# Patient Record
Sex: MALE | Race: BLACK OR AFRICAN AMERICAN | NOT HISPANIC OR LATINO | Employment: OTHER | URBAN - METROPOLITAN AREA
[De-identification: names, ages, dates, MRNs, and addresses within clinical notes are randomized per-mention and may not be internally consistent; named-entity substitution may affect disease eponyms.]

---

## 2017-11-02 ENCOUNTER — TELEPHONE (OUTPATIENT)
Dept: FAMILY MEDICINE | Facility: CLINIC | Age: 62
End: 2017-11-02

## 2017-11-02 DIAGNOSIS — Z12.11 SCREENING FOR MALIGNANT NEOPLASM OF COLON: Primary | ICD-10-CM

## 2017-11-02 NOTE — TELEPHONE ENCOUNTER
----- Message from Jie Rutledge sent at 11/2/2017 11:03 AM CDT -----  Contact: [phone] Cris   Pt is requesting a order for the colonoscopy Please call pt wife  at your earliest convenience.  Thanks !            Recommendation:      - Discharge patient to home.                       - Resume previous diet today.                       - Await pathology results.                       - Repeat colonoscopy in 2 years for surveillance.

## 2017-11-29 ENCOUNTER — ANESTHESIA EVENT (OUTPATIENT)
Dept: ENDOSCOPY | Facility: HOSPITAL | Age: 62
End: 2017-11-29
Payer: COMMERCIAL

## 2017-11-30 ENCOUNTER — SURGERY (OUTPATIENT)
Age: 62
End: 2017-11-30

## 2017-11-30 ENCOUNTER — HOSPITAL ENCOUNTER (OUTPATIENT)
Facility: HOSPITAL | Age: 62
Discharge: HOME | End: 2017-11-30
Attending: INTERNAL MEDICINE | Admitting: INTERNAL MEDICINE
Payer: COMMERCIAL

## 2017-11-30 ENCOUNTER — ANESTHESIA (OUTPATIENT)
Dept: ENDOSCOPY | Facility: HOSPITAL | Age: 62
End: 2017-11-30
Payer: COMMERCIAL

## 2017-11-30 VITALS
WEIGHT: 180 LBS | OXYGEN SATURATION: 99 % | BODY MASS INDEX: 26.66 KG/M2 | TEMPERATURE: 98 F | RESPIRATION RATE: 18 BRPM | HEIGHT: 69 IN | HEART RATE: 58 BPM | SYSTOLIC BLOOD PRESSURE: 134 MMHG | DIASTOLIC BLOOD PRESSURE: 78 MMHG

## 2017-11-30 DIAGNOSIS — Z12.11 SCREEN FOR COLON CANCER: ICD-10-CM

## 2017-11-30 PROCEDURE — 25000003 PHARM REV CODE 250: Performed by: ANESTHESIOLOGY

## 2017-11-30 PROCEDURE — 63600175 PHARM REV CODE 636 W HCPCS: Performed by: NURSE ANESTHETIST, CERTIFIED REGISTERED

## 2017-11-30 PROCEDURE — 88305 TISSUE EXAM BY PATHOLOGIST: CPT | Performed by: PATHOLOGY

## 2017-11-30 PROCEDURE — D9220A PRA ANESTHESIA: Mod: 33,ANES,, | Performed by: ANESTHESIOLOGY

## 2017-11-30 PROCEDURE — 37000008 HC ANESTHESIA 1ST 15 MINUTES: Performed by: INTERNAL MEDICINE

## 2017-11-30 PROCEDURE — 88305 TISSUE EXAM BY PATHOLOGIST: CPT | Mod: 26,,, | Performed by: PATHOLOGY

## 2017-11-30 PROCEDURE — 45385 COLONOSCOPY W/LESION REMOVAL: CPT | Performed by: INTERNAL MEDICINE

## 2017-11-30 PROCEDURE — 27201089 HC SNARE, DISP (ANY): Performed by: INTERNAL MEDICINE

## 2017-11-30 PROCEDURE — D9220A PRA ANESTHESIA: Mod: 33,CRNA,, | Performed by: NURSE ANESTHETIST, CERTIFIED REGISTERED

## 2017-11-30 PROCEDURE — 37000009 HC ANESTHESIA EA ADD 15 MINS: Performed by: INTERNAL MEDICINE

## 2017-11-30 RX ORDER — PROPOFOL 10 MG/ML
VIAL (ML) INTRAVENOUS
Status: DISCONTINUED | OUTPATIENT
Start: 2017-11-30 | End: 2017-11-30

## 2017-11-30 RX ORDER — PROPOFOL 10 MG/ML
VIAL (ML) INTRAVENOUS
Status: DISCONTINUED
Start: 2017-11-30 | End: 2017-11-30 | Stop reason: HOSPADM

## 2017-11-30 RX ORDER — LIDOCAINE HYDROCHLORIDE 20 MG/ML
INJECTION, SOLUTION EPIDURAL; INFILTRATION; INTRACAUDAL; PERINEURAL
Status: DISCONTINUED
Start: 2017-11-30 | End: 2017-11-30 | Stop reason: HOSPADM

## 2017-11-30 RX ORDER — LIDOCAINE HCL/PF 100 MG/5ML
SYRINGE (ML) INTRAVENOUS
Status: DISCONTINUED | OUTPATIENT
Start: 2017-11-30 | End: 2017-11-30

## 2017-11-30 RX ORDER — SODIUM CHLORIDE 9 MG/ML
INJECTION, SOLUTION INTRAVENOUS CONTINUOUS
Status: DISCONTINUED | OUTPATIENT
Start: 2017-11-30 | End: 2017-11-30 | Stop reason: HOSPADM

## 2017-11-30 RX ORDER — LIDOCAINE HYDROCHLORIDE 10 MG/ML
1 INJECTION, SOLUTION EPIDURAL; INFILTRATION; INTRACAUDAL; PERINEURAL ONCE
Status: DISCONTINUED | OUTPATIENT
Start: 2017-11-30 | End: 2017-11-30 | Stop reason: HOSPADM

## 2017-11-30 RX ADMIN — PROPOFOL 80 MG: 10 INJECTION, EMULSION INTRAVENOUS at 09:11

## 2017-11-30 RX ADMIN — SODIUM CHLORIDE: 0.9 INJECTION, SOLUTION INTRAVENOUS at 08:11

## 2017-11-30 RX ADMIN — LIDOCAINE HYDROCHLORIDE 100 MG: 20 INJECTION, SOLUTION INTRAVENOUS at 09:11

## 2017-11-30 RX ADMIN — PROPOFOL 40 MG: 10 INJECTION, EMULSION INTRAVENOUS at 10:11

## 2017-11-30 NOTE — ANESTHESIA POSTPROCEDURE EVALUATION
"Anesthesia Post Evaluation    Patient: Herbie Naqvi    Procedure(s) Performed: Procedure(s) (LRB):  COLONOSCOPY (N/A)    Final Anesthesia Type: general  Patient location during evaluation: GI PACU  Patient participation: Yes- Able to Participate  Level of consciousness: awake and alert, oriented and awake  Post-procedure vital signs: reviewed and stable  Airway patency: patent  PONV status at discharge: No PONV  Anesthetic complications: no      Cardiovascular status: blood pressure returned to baseline  Respiratory status: unassisted, spontaneous ventilation and room air  Hydration status: euvolemic  Follow-up not needed.        Visit Vitals  /84 (BP Location: Left arm, Patient Position: Lying)   Pulse 73   Temp 36.4 °C (97.5 °F) (Oral)   Resp 18   Ht 5' 9" (1.753 m)   Wt 81.6 kg (180 lb)   SpO2 96%   BMI 26.58 kg/m²       Pain/Maribell Score: Pain Assessment Performed: Yes (11/30/2017 10:17 AM)  Presence of Pain: denies (11/30/2017 10:17 AM)  Maribell Score: 9 (11/30/2017 10:17 AM)      "

## 2017-11-30 NOTE — H&P
"Pre-Procedure H&P:  Reason for Procedure: Colon Ca screening    HPI:  Pt is a 62 y.o. male here for colonoscopy.  Pt. Last had C-scope in 2015, but prep was completely adequate.  No current GI complaints.  No known family Hx of Colon Ca.      History reviewed. No pertinent past medical history.    Past Surgical History:   Procedure Laterality Date    wrist broke       rt side wrist       Family History   Problem Relation Age of Onset    Diabetes Mother     Heart disease Father     Hyperlipidemia Father        Social History     Social History    Marital status:      Spouse name: N/A    Number of children: N/A    Years of education: N/A     Occupational History    Not on file.     Social History Main Topics    Smoking status: Former Smoker     Start date: 3/25/2014    Smokeless tobacco: Never Used    Alcohol use No      Comment: former drinker social stop drinking    Drug use: No    Sexual activity: Not on file     Other Topics Concern    Not on file     Social History Narrative    No narrative on file       Endoscopic History:  C-scope, 2015    Review of patient's allergies indicates:  No Known Allergies    No current facility-administered medications on file prior to encounter.      No current outpatient prescriptions on file prior to encounter.       Current Facility-Administered Medications:     0.9%  NaCl infusion, , Intravenous, Continuous, Andrew Rushing MD, Last Rate: 10 mL/hr at 11/30/17 0848    lidocaine (PF) 10 mg/ml (1%) injection 10 mg, 1 mL, Intradermal, Once, Andrew Rushing MD    ROS: Negative x 10    Patient Vitals for the past 24 hrs:   BP Temp Temp src Pulse Resp SpO2 Height Weight   11/30/17 0837 117/67 98 °F (36.7 °C) Oral (!) 54 18 96 % 5' 9" (1.753 m) 81.6 kg (180 lb)       Gen: Well developed, well nourished, no apparent distress  HEENT: Anicteric, PERRLA  CV: S1, S2, no murmers/rubs, non-displaced PMI  Lungs: CTA-B, normal excursion  Abd: Soft, NT, ND, " normal BS's, no HSM  Ext: No c/c/e, 1+ DP pulses to BLE's  Neuro: CN II-XII grossly intact, no asterixis.  Skin: No rashes/lesions.  Psych: AA&O x 4    Assessment:  Pt. Is a 62 y.o. male with:  1. Colon Ca screening    Recommendations:  1. Colonoscopy  2. F/U with PCP      I would like to take this opportunity to thank you for this consult.  If you have any questions or concerns, please do not hesitate to contact me.

## 2017-11-30 NOTE — TRANSFER OF CARE
"Anesthesia Transfer of Care Note    Patient: Herbie Naqvi    Procedure(s) Performed: Procedure(s) (LRB):  COLONOSCOPY (N/A)    Patient location: GI    Anesthesia Type: general    Transport from OR: Transported from OR on room air with adequate spontaneous ventilation    Post pain: adequate analgesia    Post assessment: no apparent anesthetic complications and tolerated procedure well    Post vital signs: stable    Level of consciousness: awake    Nausea/Vomiting: no nausea/vomiting    Complications: none    Transfer of care protocol was followed      Last vitals:   Visit Vitals  /84 (BP Location: Left arm, Patient Position: Lying)   Pulse 73   Temp 36.4 °C (97.5 °F) (Oral)   Resp 18   Ht 5' 9" (1.753 m)   Wt 81.6 kg (180 lb)   SpO2 96%   BMI 26.58 kg/m²     "

## 2017-11-30 NOTE — DISCHARGE INSTRUCTIONS
Diverticulosis    Diverticulosis means that small pouches have formed in the wall of your large intestine (colon). Most often, this problem causes no symptoms and is common as people age. But the pouches in the colon are at risk of becoming infected. When this happens, the condition is called diverticulitis. Although most people with diverticulosis never develop diverticulitis, it is still not uncommon. Rectal bleeding can also occur and in less common situations, a type of colon inflammation called colitis.  While most people do not have symptoms, some people with diverticulosis may have:  · Abdominal cramps and pain  · Bloating  · Constipation  · Change in bowel habits  Causes  The exact cause of diverticulosis (and diverticulitis) has not been proved, but a few things are associated with the condition:  · Low-fiber diet  · Constipation  · Lack of exercise  Your healthcare provider will talk with you about how to manage your condition. Diet changes may be all that are needed to help control diverticulosis and prevent progression to diverticulitis. If you develop diverticulitis, you will likely need other treatments.  Home care  You may be told to take fiber supplements daily. Fiber adds bulk to the stool so that it passes through the colon more easily. Stool softeners may be recommended. You may also be given medications for pain relief. Be sure to take all medications as directed.  In the past, people were told to avoid corn, nuts, and seeds. This is no longer necessary.  Follow these guidelines when caring for yourself at home:  · Eat unprocessed foods that are high in fiber. Whole grains, fruits, and vegetables are good choices.  · Drink 6 to 8 glasses of water every day unless your healthcare provider has you limit how much fluid you should have.  · Watch for changes in your bowel movements. Tell your provider if you notice any changes.  · Begin an exercise program. Ask your provider how to get started.  Generally, walking is the best.  · Get plenty of rest and sleep.  Follow-up care  Follow up with your healthcare provider, or as advised. Regular visits may be needed to check on your health. Sometimes special procedures such as colonoscopy, are needed after an episode of diverticulitis or blooding. Be sure to keep all your appointments.  If a stool sample was taken, or cultures were done, you should be told if they are positive, or if your treatment needs to be changed. You can call as directed for the results.  If X-rays were done, a radiologist will look at them. You will be told if there is a change in your treatment.  If antibiotics were prescribed, be sure to finish them all.  When to seek medical advice  Call your healthcare provider right away if any of these occur:  · Fever of 100.4°F (38°C) or higher, or as directed by your healthcare provider  · Severe cramps in the lower left side of the abdomen or pain that is getting worse  · Tenderness in the lower left side of the abdomen or worsening pain throughout the abdomen  · Diarrhea or constipation that doesn't get better within 24 hours  · Nausea and vomiting  · Bleeding from the rectum  Call 911  Call emergency services if any of the following occur:  · Trouble breathing  · Confusion  · Very drowsy or trouble awakening  · Fainting or loss of consciousness  · Rapid heart rate  · Chest pain  Date Last Reviewed: 12/30/2015 © 2000-2017 [employer]. [address] [place] [address]. All rights reserved. This information is not intended as a substitute for professional medical care. Always follow your healthcare professional's instructions.        High-Fiber Diet  Fiber is in fruits, vegetables, cereals, and grains. Fiber passes through your body undigested. A high-fiber diet helps food move through your intestinal tract. The added bulk is helpful in preventing constipation. In people with diverticulosis, fiber helps clean out the pouches  along the colon wall. It also prevents new pouches from forming. A high-fiber diet reduces the risk of colon cancer. It also lowers blood cholesterol and prevents high blood sugar in people with diabetes.    The fiber-rich foods listed below should be part of your diet. If you are not used to high-fiber foods, start with 1 or 2 foods from this list. Every 3 to 4 days add a new one to your diet. Do this until you are eating 4 high-fiber foods per day. This should give you 20 to 35 grams of fiber a day. It is also important to drink a lot of water when you are on this diet. You should have 6 to 8 glasses of water a day. Water makes the fiber swell and increases the benefit.  Foods high in dietary fiber  The following foods are high in dietary fiber:  · Breads. Breads made with 100% whole-wheat flour; rachelle, wheat, or rye crackers; whole-grain tortillas, bran muffins.  · Cereals. Whole-grain and bran cereals with bran (shredded wheat, wheat flakes, raisin bran, corn bran); oatmeal, rolled oats, granola, and brown rice.  · Fruits. Fresh fruits and their edible skins (pears, prunes, raisins, berries, apples, and apricots); bananas, citrus fruit, mangoes, pineapple; and prune juice.  · Nuts. Any nuts and seeds.  · Vegetables. Best served raw or lightly cooked. All types, especially: green peas, celery, eggplant, potatoes, spinach, broccoli, [place] sprouts, winter squash, carrots, cauliflower, soybeans, lentils, and fresh and dried beans of all kinds.  · Other. Popcorn, any spices.  Date Last Reviewed: 8/1/2016  © 4849-1554 [employer]. [address] [place] [address], [place] [address]. All rights reserved. This information is not intended as a substitute for professional medical care. Always follow your healthcare professional's instructions.        Understanding Colon and Rectal Polyps    The colon (also called the large intestine) is a muscular tube that forms the last part of the digestive tract. It absorbs  water and stores food waste. The colon is about 4 to 6 feet long. The rectum is the last 6 inches of the colon. The colon and rectum have a smooth lining composed of millions of cells. Changes in these cells can lead to growths in the colon that can become cancerous and should be removed. Multiple tests are available to screen for colon cancer, but the colonoscopy is the most recommended test. During colonoscopy, these polyps can be removed. How often you need this test depends on many things including your condition, your family history, symptoms, and what the findings were at the previous colonoscopy.   When the colon lining changes  Changes that happen in the cells that line the colon or rectum can lead to growths called polyps. Over a period of years, polyps can turn cancerous. Removing polyps early may prevent cancer from ever forming.  Polyps  Polyps are fleshy clumps of tissue that form on the lining of the colon or rectum. Small polyps are usually benign (not cancerous). However, over time, cells in a polyp can change and become cancerous. Certain types of polyps known as adenomatous polyps are premalignant. The risk for invasive cancer increases with the size of the polyp and certain cell and gene features. This means that they can become cancerous if they're not removed. Hyperplastic polyps are benign. They can grow quite large and not turn cancerous.   Cancer  Almost all colorectal cancers start when polyp cells begin growing abnormally. As a cancerous tumor grows, it may involve more and more of the colon or rectum. In time, cancer can also grow beyond the colon or rectum and spread to nearby organs or to glands called lymph nodes. The cells can also travel to other parts of the body. This is known as metastasis. The earlier a cancerous tumor is removed, the better the chance of preventing its spread.    Date Last Reviewed: 8/1/2016  © 8205-4231 [employer]. [address] [place] [address]  [place] [address]. All rights reserved. This information is not intended as a substitute for professional medical care. Always follow your healthcare professional's instructions.

## 2017-11-30 NOTE — ANESTHESIA PREPROCEDURE EVALUATION
11/30/2017  Herbie Naqvi is a 62 y.o., male.    Pre-op Assessment    I have reviewed the Patient Summary Reports.     I have reviewed the Nursing Notes.      Review of Systems  Anesthesia Hx:  No problems with previous Anesthesia    Social:  Non-Smoker, No Alcohol Use Former smoker     Cardiovascular:  Cardiovascular Normal Exercise tolerance: good     Pulmonary:  Pulmonary Normal    Renal/:  Renal/ Normal     Hepatic/GI:  Hepatic/GI Normal        Physical Exam  General:  Well nourished    Airway/Jaw/Neck:  Airway Findings: Mouth Opening: Normal Tongue: Normal  General Airway Assessment: Adult  Mallampati: III  TM Distance: Normal, at least 6 cm  Jaw/Neck Findings:  Neck ROM: Normal ROM      Dental:  Dental Findings: In tact, Upper Dentures, Lower Dentures        Mental Status:  Mental Status Findings:  Cooperative, Alert and Oriented         Anesthesia Plan  Type of Anesthesia, risks & benefits discussed:  Anesthesia Type:  general  Patient's Preference:   Intra-op Monitoring Plan:   Intra-op Monitoring Plan Comments:   Post Op Pain Control Plan:   Post Op Pain Control Plan Comments:   Induction:   IV  Beta Blocker:         Informed Consent: Patient understands risks and agrees with Anesthesia plan.  Questions answered. Anesthesia consent signed with patient.  ASA Score: 2     Day of Surgery Review of History & Physical:    H&P update referred to the provider.         Ready For Surgery From Anesthesia Perspective.

## 2017-12-15 ENCOUNTER — TELEPHONE (OUTPATIENT)
Dept: FAMILY MEDICINE | Facility: CLINIC | Age: 62
End: 2017-12-15

## 2017-12-15 DIAGNOSIS — G89.29 CHRONIC PAIN OF RIGHT KNEE: Primary | ICD-10-CM

## 2017-12-15 DIAGNOSIS — M25.561 CHRONIC PAIN OF RIGHT KNEE: Primary | ICD-10-CM

## 2017-12-15 NOTE — TELEPHONE ENCOUNTER
Pt states was seen before for right knee pain, (Dr Johsn in sport medicine in 2015) would like to get another referral so he can see about getting injection

## 2017-12-15 NOTE — TELEPHONE ENCOUNTER
----- Message from Camila Baca sent at 12/15/2017 10:15 AM CST -----  Contact: Self/[phone]  Patient is requesting a referral. He states that he's experiencing knee pain. Thank you.

## 2017-12-18 NOTE — TELEPHONE ENCOUNTER
Spoke to patient's wife. Informed of referral sent on December 15,2017.. Number given to call to check status and to make appointment. Verbalized understanding.

## 2017-12-18 NOTE — TELEPHONE ENCOUNTER
----- Message from Jud Anderson sent at 12/18/2017  9:08 AM CST -----  Contact: wife  Wife called requesting a new referral to see Dr John Johns/Sports Medicine. Please contact Mrs Naqvi [phone].    Thanks!

## 2017-12-19 ENCOUNTER — TELEPHONE (OUTPATIENT)
Dept: FAMILY MEDICINE | Facility: CLINIC | Age: 62
End: 2017-12-19

## 2017-12-20 NOTE — PROGRESS NOTES
CC: Right knee pain    62 y.o. Male who presents as a new patient to me; saw my colleague John Johns back in 2015.  Given a steroid injection which provided excellent relief.  Pain is now recurrent.  Localizes medially but can be diffuse as well. He works he works in construction.  Exacerbated with going up and down ladders and prolonged periods on feet.  Also reports stiffness and generalized discomfort when getting up in the morning.  Describes secondary activity limitation.  Sometimes nighttime sleep disruption.  Prior treatment is also included therapy and oral medication.  Is drinker's previous discrete knee injury while playing football in high school.  Denies any left-sided complaints.    Occasional for smoking.   Negative for diabetes.    This patient is seen in consultation requested by Adilia Menjivar PA-C. We will communicate findings back to the requesting provider via electronic letter.     REVIEW OF SYSTEMS:   Constitution: Negative. Negative for chills, fever and night sweats.    Hematologic/Lymphatic: Negative for bleeding problem. Does not bruise/bleed easily.   Skin: Negative for dry skin, itching and rash.   Musculoskeletal: Negative for falls. Positive for right knee pain and  muscle weakness.     PAST MEDICAL HISTORY:   History reviewed. No pertinent past medical history.    PAST SURGICAL HISTORY:   Past Surgical History:   Procedure Laterality Date    COLONOSCOPY N/A 11/30/2017    Procedure: COLONOSCOPY;  Surgeon: John Roman MD;  Location: [facility];  Service: Endoscopy;  Laterality: N/A;  colonoscopy confirmed    wrist broke       rt side wrist       FAMILY HISTORY:   Family History   Problem Relation Age of Onset    Diabetes Mother     Heart disease Father     Hyperlipidemia Father        SOCIAL HISTORY:   Social History     Social History    Marital status:      Spouse name: N/A    Number of children: N/A    Years of education: N/A     Occupational History    Not  "on file.     Social History Main Topics    Smoking status: Former Smoker     Start date: 3/25/2014    Smokeless tobacco: Never Used    Alcohol use No      Comment: former drinker social stop drinking    Drug use: No    Sexual activity: Not on file     Other Topics Concern    Not on file     Social History Narrative    No narrative on file       MEDICATIONS:   No current outpatient prescriptions on file.    ALLERGIES:   Review of patient's allergies indicates:  No Known Allergies     PHYSICAL EXAMINATION:  /84   Pulse 77   Ht 5' 9" (1.753 m)   Wt 81.6 kg (180 lb)   BMI 26.58 kg/m²   General: Well-developed well-nourished 62 y.o. malein no acute distress   Cardiovascular: Regular rhythm by palpation of distal pulse, normal color and temperature, no concerning varicosities on symptomatic side   Lungs: No labored breathing or wheezing appreciated   Neuro: Alert and oriented ×3   Psychiatric: well oriented to person, place and time, demonstrates normal mood and affect   Skin: No rashes, lesions or ulcers, normal temperature, turgor, and texture on involved extremity    Ortho/SPM Exam   Examination of the right knee demonstrates bony hypertrophy of osteoarthritis with associated swelling.  No effusion.  Good quadriceps bulk and tone.  Intact extensor mechanism.  Passive range of motion is 5° short of full extension to 120° of flexion.  Ligamentously stable.  Pain and crepitus on range of motion.  Positive patellar grind test.  Medial joint line tenderness.  Standing varus alignment.    IMAGING:    X-rays including standing, weight bearing AP and flexion bilateral knees, RIGHT knee lateral and sunrise views ordered and images reviewed by me show:    Right compartment degenerative arthritis which is advanced, bone-on-bone changes.    ASSESSMENT:      ICD-10-CM ICD-9-CM   1. Primary osteoarthritis of right knee M17.11 715.16   2. Chronic pain of right knee M25.561 719.46    G89.29 338.29     PLAN:   "     Treatment options for advanced knee arthritis were discussed.  Both operative and nonoperative.  The patient would like to hold off on knee replacement at this time.  Repeat steroid injection was provided.  We also discussed low-impact cardio exercise in smoking cessation.  Possible knee replacement next summer.  I'm happy to see him back as needed.    Large Joint Aspiration/Injection  Date/Time: 12/21/2017 4:32 PM  Performed by: CAROL WATSON  Authorized by: CAROL WATSON     Consent Done?:  Yes (Verbal)  Indications:  Pain  Procedure site marked: Yes    Timeout: Prior to procedure the correct patient, procedure, and site was verified      Location:  Knee  Site:  L knee  Prep: Patient was prepped and draped in usual sterile fashion    Ultrasonic Guidance for needle placement: No  Needle size:  22 G  Approach:  Lateral  Medications:  40 mg triamcinolone acetonide 40 mg/mL  Patient tolerance:  Patient tolerated the procedure well with no immediate complications

## 2017-12-21 ENCOUNTER — OFFICE VISIT (OUTPATIENT)
Dept: SPORTS MEDICINE | Facility: CLINIC | Age: 62
End: 2017-12-21
Payer: COMMERCIAL

## 2017-12-21 ENCOUNTER — HOSPITAL ENCOUNTER (OUTPATIENT)
Dept: RADIOLOGY | Facility: HOSPITAL | Age: 62
Discharge: HOME | End: 2017-12-21
Attending: ORTHOPAEDIC SURGERY
Payer: COMMERCIAL

## 2017-12-21 VITALS
DIASTOLIC BLOOD PRESSURE: 84 MMHG | HEIGHT: 69 IN | HEART RATE: 77 BPM | BODY MASS INDEX: 26.66 KG/M2 | SYSTOLIC BLOOD PRESSURE: 127 MMHG | WEIGHT: 180 LBS

## 2017-12-21 DIAGNOSIS — M25.561 CHRONIC PAIN OF RIGHT KNEE: ICD-10-CM

## 2017-12-21 DIAGNOSIS — G89.29 CHRONIC PAIN OF RIGHT KNEE: ICD-10-CM

## 2017-12-21 DIAGNOSIS — M17.11 PRIMARY OSTEOARTHRITIS OF RIGHT KNEE: Primary | ICD-10-CM

## 2017-12-21 DIAGNOSIS — M25.561 RIGHT KNEE PAIN, UNSPECIFIED CHRONICITY: ICD-10-CM

## 2017-12-21 PROCEDURE — 73562 X-RAY EXAM OF KNEE 3: CPT | Mod: 26,59,LT, | Performed by: RADIOLOGY

## 2017-12-21 PROCEDURE — 99213 OFFICE O/P EST LOW 20 MIN: CPT | Mod: 25,S$GLB,, | Performed by: ORTHOPAEDIC SURGERY

## 2017-12-21 PROCEDURE — 99999 PR PBB SHADOW E&M-EST. PATIENT-LVL III: CPT | Mod: PBBFAC,,, | Performed by: ORTHOPAEDIC SURGERY

## 2017-12-21 PROCEDURE — 20610 DRAIN/INJ JOINT/BURSA W/O US: CPT | Mod: LT,S$GLB,, | Performed by: ORTHOPAEDIC SURGERY

## 2017-12-21 PROCEDURE — 73564 X-RAY EXAM KNEE 4 OR MORE: CPT | Mod: 26,RT,, | Performed by: RADIOLOGY

## 2017-12-21 PROCEDURE — 73564 X-RAY EXAM KNEE 4 OR MORE: CPT | Mod: TC,PO,RT

## 2017-12-21 RX ORDER — MELOXICAM 15 MG/1
15 TABLET ORAL DAILY
Qty: 60 TABLET | Refills: 2 | Status: SHIPPED | OUTPATIENT
Start: 2017-12-21 | End: 2019-03-19 | Stop reason: SDUPTHER

## 2017-12-21 RX ORDER — TRIAMCINOLONE ACETONIDE 40 MG/ML
40 INJECTION, SUSPENSION INTRA-ARTICULAR; INTRAMUSCULAR
Status: DISCONTINUED | OUTPATIENT
Start: 2017-12-21 | End: 2017-12-21 | Stop reason: HOSPADM

## 2017-12-21 RX ADMIN — TRIAMCINOLONE ACETONIDE 40 MG: 40 INJECTION, SUSPENSION INTRA-ARTICULAR; INTRAMUSCULAR at 04:12

## 2017-12-21 NOTE — LETTER
December 21, 2017      Adilia Menjivar PA-C  [address]  [place] [address]           [facility]  [address]  [facility] [address]  Phone: [phone]          Patient: Herbie Naqvi   MR Number: [id number]   YOB: 1955   Date of Visit: 12/21/2017       Dear Adilia Menjivar:    Thank you for referring Herbie Naqvi to me for evaluation. Attached you will find relevant portions of my assessment and plan of care.    If you have questions, please do not hesitate to call me. I look forward to following Herbie Naqvi along with you.    Sincerely,    W Mitchel Rodríguez MD    Enclosure  CC:  No Recipients    If you would like to receive this communication electronically, please contact [email or web address] or [phone] to request more information on [employer] Link access.    For providers and/or their staff who would like to refer a patient to [facility], please contact us through our one-stop-shop provider referral line, Joni Hutton, at [phone].    If you feel you have received this communication in error or would no longer like to receive these types of communications, please e-mail [email or web address]

## 2018-03-22 ENCOUNTER — LAB VISIT (OUTPATIENT)
Dept: LAB | Facility: HOSPITAL | Age: 63
End: 2018-03-22
Attending: FAMILY MEDICINE
Payer: COMMERCIAL

## 2018-03-22 ENCOUNTER — OFFICE VISIT (OUTPATIENT)
Dept: FAMILY MEDICINE | Facility: CLINIC | Age: 63
End: 2018-03-22
Payer: COMMERCIAL

## 2018-03-22 VITALS
RESPIRATION RATE: 18 BRPM | BODY MASS INDEX: 28.05 KG/M2 | OXYGEN SATURATION: 97 % | HEIGHT: 69 IN | TEMPERATURE: 98 F | DIASTOLIC BLOOD PRESSURE: 88 MMHG | WEIGHT: 189.38 LBS | SYSTOLIC BLOOD PRESSURE: 136 MMHG | HEART RATE: 77 BPM

## 2018-03-22 DIAGNOSIS — S83.231D COMPLEX TEAR OF MEDIAL MENISCUS OF RIGHT KNEE AS CURRENT INJURY, SUBSEQUENT ENCOUNTER: ICD-10-CM

## 2018-03-22 DIAGNOSIS — Z00.00 WELL ADULT EXAM: Primary | ICD-10-CM

## 2018-03-22 DIAGNOSIS — Z00.00 WELL ADULT EXAM: ICD-10-CM

## 2018-03-22 PROBLEM — S83.231A COMPLEX TEAR OF MEDIAL MENISCUS OF RIGHT KNEE AS CURRENT INJURY: Status: ACTIVE | Noted: 2018-03-22

## 2018-03-22 LAB
BASOPHILS # BLD AUTO: 0.06 K/UL
BASOPHILS NFR BLD: 1.1 %
DIFFERENTIAL METHOD: ABNORMAL
EOSINOPHIL # BLD AUTO: 0.1 K/UL
EOSINOPHIL NFR BLD: 1.1 %
ERYTHROCYTE [DISTWIDTH] IN BLOOD BY AUTOMATED COUNT: 14.4 %
HCT VFR BLD AUTO: 41.8 %
HGB BLD-MCNC: 13.7 G/DL
IMM GRANULOCYTES # BLD AUTO: 0.01 K/UL
IMM GRANULOCYTES NFR BLD AUTO: 0.2 %
LYMPHOCYTES # BLD AUTO: 2.7 K/UL
LYMPHOCYTES NFR BLD: 48.5 %
MCH RBC QN AUTO: 29.8 PG
MCHC RBC AUTO-ENTMCNC: 32.8 G/DL
MCV RBC AUTO: 91 FL
MONOCYTES # BLD AUTO: 0.4 K/UL
MONOCYTES NFR BLD: 7.6 %
NEUTROPHILS # BLD AUTO: 2.3 K/UL
NEUTROPHILS NFR BLD: 41.5 %
NRBC BLD-RTO: 0 /100 WBC
PLATELET # BLD AUTO: 259 K/UL
PMV BLD AUTO: 9.6 FL
RBC # BLD AUTO: 4.6 M/UL
WBC # BLD AUTO: 5.5 K/UL

## 2018-03-22 PROCEDURE — 80061 LIPID PANEL: CPT

## 2018-03-22 PROCEDURE — 99396 PREV VISIT EST AGE 40-64: CPT | Mod: S$GLB,,, | Performed by: FAMILY MEDICINE

## 2018-03-22 PROCEDURE — 80053 COMPREHEN METABOLIC PANEL: CPT

## 2018-03-22 PROCEDURE — 84153 ASSAY OF PSA TOTAL: CPT

## 2018-03-22 PROCEDURE — 85025 COMPLETE CBC W/AUTO DIFF WBC: CPT

## 2018-03-22 PROCEDURE — 99999 PR PBB SHADOW E&M-EST. PATIENT-LVL III: CPT | Mod: PBBFAC,,, | Performed by: FAMILY MEDICINE

## 2018-03-22 PROCEDURE — 36415 COLL VENOUS BLD VENIPUNCTURE: CPT | Mod: PO

## 2018-03-22 NOTE — PROGRESS NOTES
Chief Complaint   Patient presents with    Annual Exam       Herbie Naqvi is a 62 y.o. male who presents per the Chief Complaint.  Pt is known to me and was last seen by me on 6/25/2015.  All known chronic medical issues have been documented.       Knee Pain    The incident occurred more than 1 week ago. There was no injury mechanism. The pain is present in the right knee. The quality of the pain is described as aching and stabbing. The pain is at a severity of 5/10. The pain is moderate. The pain has been intermittent since onset. Pertinent negatives include no inability to bear weight, loss of motion, loss of sensation, muscle weakness, numbness or tingling. He reports no foreign bodies present. The symptoms are aggravated by movement and weight bearing. He has tried nothing for the symptoms.        ROS  Review of Systems   Constitutional: Negative for activity change, appetite change, chills, fatigue and fever.   HENT: Negative for congestion, ear pain, postnasal drip, rhinorrhea, sinus pressure, sore throat and trouble swallowing.    Eyes: Negative for pain and visual disturbance.   Respiratory: Negative for cough, chest tightness and shortness of breath.    Cardiovascular: Negative for chest pain.   Gastrointestinal: Negative for abdominal pain, constipation, diarrhea, nausea and vomiting.   Genitourinary: Negative for difficulty urinating, dysuria, flank pain, frequency, penile pain, penile swelling, scrotal swelling, testicular pain and urgency.   Musculoskeletal: Positive for arthralgias (right knee). Negative for back pain, joint swelling, myalgias, neck pain and neck stiffness.   Skin: Negative.    Allergic/Immunologic: Negative for environmental allergies, food allergies and immunocompromised state.   Neurological: Negative for dizziness, tingling, weakness, light-headedness, numbness and headaches.   Psychiatric/Behavioral: Negative.        Physical Exam  Vitals:    03/22/18 1321   BP: 136/88   Pulse:  "77   Resp: 18   Temp: 98.2 °F (36.8 °C)    Body mass index is 27.97 kg/m².  Weight: 85.9 kg (189 lb 6 oz)   Height: 5' 9" (175.3 cm)     Physical Exam   Constitutional: He is oriented to person, place, and time. He appears well-developed and well-nourished. He is active and cooperative.  Non-toxic appearance. He does not have a sickly appearance. He does not appear ill. No distress.   HENT:   Head: Normocephalic and atraumatic.   Right Ear: Hearing, tympanic membrane, external ear and ear canal normal. No tenderness. No foreign bodies. No mastoid tenderness. Tympanic membrane is not injected, not scarred, not perforated, not erythematous, not retracted and not bulging. No hemotympanum. No decreased hearing is noted.   Left Ear: Hearing, tympanic membrane, external ear and ear canal normal. No tenderness. No foreign bodies. No mastoid tenderness. Tympanic membrane is not injected, not scarred, not perforated, not erythematous, not retracted and not bulging. No hemotympanum. No decreased hearing is noted.   Nose: Nose normal. No sinus tenderness, nasal deformity or septal deviation. No epistaxis.  No foreign bodies.   Mouth/Throat: Uvula is midline, oropharynx is clear and moist and mucous membranes are normal. He does not have dentures. Normal dentition. No uvula swelling or dental caries. No oropharyngeal exudate, posterior oropharyngeal edema, posterior oropharyngeal erythema or tonsillar abscesses.   Eyes: Conjunctivae, EOM and lids are normal. Pupils are equal, round, and reactive to light. Right eye exhibits no chemosis, no discharge, no exudate and no hordeolum. No foreign body present in the right eye. Left eye exhibits no chemosis, no discharge, no exudate and no hordeolum. No foreign body present in the left eye. No scleral icterus.   Neck: Normal range of motion and full passive range of motion without pain. Neck supple. No thyroid mass and no thyromegaly present.   Cardiovascular: Normal rate, regular " rhythm, S1 normal, S2 normal and normal heart sounds.  Exam reveals no gallop and no friction rub.    No murmur heard.  Pulmonary/Chest: Effort normal and breath sounds normal. He has no decreased breath sounds. He has no wheezes. He has no rhonchi. He has no rales.   Abdominal: Soft. Normal appearance and bowel sounds are normal. He exhibits no distension, no ascites and no mass. There is no hepatosplenomegaly. There is no tenderness. There is no rigidity, no rebound and no guarding. No hernia.   Musculoskeletal: Normal range of motion.   Lymphadenopathy:        Head (right side): No submental, no submandibular, no preauricular and no posterior auricular adenopathy present.        Head (left side): No submental, no submandibular, no preauricular and no posterior auricular adenopathy present.     He has no cervical adenopathy.   Neurological: He is alert and oriented to person, place, and time. He has normal strength. He displays no atrophy and no tremor. No cranial nerve deficit or sensory deficit. He exhibits normal muscle tone. He displays no seizure activity.   Skin: Skin is warm, dry and intact. No rash noted. He is not diaphoretic. No pallor.   Psychiatric: He has a normal mood and affect. His speech is normal and behavior is normal. Judgment and thought content normal. Cognition and memory are normal. He is attentive.   Vitals reviewed.      Assessment & Plan    1. Well adult exam  Discussed age and gender appropriate screenings at this visit and encouraged a healthy diet with low saturated fats, and increased physical activity.  Counseled on medically appropriate vaccines based on age and current health condition.  Screening test will be ordered and once completed, patient will be notified of results when available.  If necessary, will follow up to discuss and manage further.   - CBC auto differential; Future  - Comprehensive metabolic panel; Future  - Lipid panel; Future  - PSA, Screening; Future    2.  Complex tear of medial meniscus of right knee as current injury, subsequent encounter  Managed by Orthopedics; recent steroid injection in December.  Patient is planning on surgical repair later this year based on recommendation by surgeon.      Follow up documented    ACTIVE MEDICAL ISSUES:  Documented in Problem List    PAST MEDICAL HISTORY  Documented    PAST SURGICAL HISTORY:  Documented    SOCIAL HISTORY:  Documented    FAMILY HISTORY:  Documented    ALLERGIES AND MEDICATIONS: updated and reviewed.  Documented    Health Maintenance       Date Due Completion Date    TETANUS VACCINE 07/14/1974 ---    Zoster Vaccine 07/14/2016 ---    Influenza Vaccine 08/01/2017 ---    Lipid Panel 03/26/2020 3/26/2015    Colonoscopy 11/30/2022 11/30/2017

## 2018-03-23 LAB
ALBUMIN SERPL BCP-MCNC: 4.3 G/DL
ALP SERPL-CCNC: 63 U/L
ALT SERPL W/O P-5'-P-CCNC: 19 U/L
ANION GAP SERPL CALC-SCNC: 6 MMOL/L
AST SERPL-CCNC: 22 U/L
BILIRUB SERPL-MCNC: 0.5 MG/DL
BUN SERPL-MCNC: 11 MG/DL
CALCIUM SERPL-MCNC: 9.9 MG/DL
CHLORIDE SERPL-SCNC: 105 MMOL/L
CHOLEST SERPL-MCNC: 164 MG/DL
CHOLEST/HDLC SERPL: 3.2 {RATIO}
CO2 SERPL-SCNC: 30 MMOL/L
COMPLEXED PSA SERPL-MCNC: 1.4 NG/ML
CREAT SERPL-MCNC: 0.9 MG/DL
EST. GFR  (AFRICAN AMERICAN): >60 ML/MIN/1.73 M^2
EST. GFR  (NON AFRICAN AMERICAN): >60 ML/MIN/1.73 M^2
GLUCOSE SERPL-MCNC: 83 MG/DL
HDLC SERPL-MCNC: 51 MG/DL
HDLC SERPL: 31.1 %
LDLC SERPL CALC-MCNC: 95 MG/DL
NONHDLC SERPL-MCNC: 113 MG/DL
POTASSIUM SERPL-SCNC: 4.1 MMOL/L
PROT SERPL-MCNC: 7.2 G/DL
SODIUM SERPL-SCNC: 141 MMOL/L
TRIGL SERPL-MCNC: 90 MG/DL

## 2019-03-19 ENCOUNTER — OFFICE VISIT (OUTPATIENT)
Dept: FAMILY MEDICINE | Facility: CLINIC | Age: 64
End: 2019-03-19
Payer: COMMERCIAL

## 2019-03-19 ENCOUNTER — LAB VISIT (OUTPATIENT)
Dept: LAB | Facility: HOSPITAL | Age: 64
End: 2019-03-19
Attending: FAMILY MEDICINE
Payer: COMMERCIAL

## 2019-03-19 VITALS
HEIGHT: 69 IN | RESPIRATION RATE: 20 BRPM | OXYGEN SATURATION: 96 % | BODY MASS INDEX: 28.71 KG/M2 | DIASTOLIC BLOOD PRESSURE: 68 MMHG | WEIGHT: 193.81 LBS | SYSTOLIC BLOOD PRESSURE: 120 MMHG | HEART RATE: 61 BPM | TEMPERATURE: 98 F

## 2019-03-19 DIAGNOSIS — Z00.00 WELL ADULT EXAM: ICD-10-CM

## 2019-03-19 DIAGNOSIS — Z23 NEED FOR TETANUS BOOSTER: ICD-10-CM

## 2019-03-19 DIAGNOSIS — Z00.00 WELL ADULT EXAM: Primary | ICD-10-CM

## 2019-03-19 DIAGNOSIS — S83.231S COMPLEX TEAR OF MEDIAL MENISCUS OF RIGHT KNEE AS CURRENT INJURY, SEQUELA: ICD-10-CM

## 2019-03-19 DIAGNOSIS — N52.9 ERECTILE DYSFUNCTION, UNSPECIFIED ERECTILE DYSFUNCTION TYPE: ICD-10-CM

## 2019-03-19 LAB
ALBUMIN SERPL BCP-MCNC: 4.1 G/DL
ALP SERPL-CCNC: 61 U/L
ALT SERPL W/O P-5'-P-CCNC: 25 U/L
ANION GAP SERPL CALC-SCNC: 7 MMOL/L
AST SERPL-CCNC: 26 U/L
BASOPHILS # BLD AUTO: 0.05 K/UL
BASOPHILS NFR BLD: 1.1 %
BILIRUB SERPL-MCNC: 0.4 MG/DL
BUN SERPL-MCNC: 9 MG/DL
CALCIUM SERPL-MCNC: 9.7 MG/DL
CHLORIDE SERPL-SCNC: 105 MMOL/L
CHOLEST SERPL-MCNC: 162 MG/DL
CHOLEST/HDLC SERPL: 3.4 {RATIO}
CO2 SERPL-SCNC: 29 MMOL/L
COMPLEXED PSA SERPL-MCNC: 1.7 NG/ML
CREAT SERPL-MCNC: 0.8 MG/DL
DIFFERENTIAL METHOD: NORMAL
EOSINOPHIL # BLD AUTO: 0.2 K/UL
EOSINOPHIL NFR BLD: 3.2 %
ERYTHROCYTE [DISTWIDTH] IN BLOOD BY AUTOMATED COUNT: 14.4 %
EST. GFR  (AFRICAN AMERICAN): >60 ML/MIN/1.73 M^2
EST. GFR  (NON AFRICAN AMERICAN): >60 ML/MIN/1.73 M^2
ESTIMATED AVG GLUCOSE: 123 MG/DL
GLUCOSE SERPL-MCNC: 101 MG/DL
HBA1C MFR BLD HPLC: 5.9 %
HCT VFR BLD AUTO: 41.9 %
HDLC SERPL-MCNC: 48 MG/DL
HDLC SERPL: 29.6 %
HGB BLD-MCNC: 14.1 G/DL
IMM GRANULOCYTES # BLD AUTO: 0.02 K/UL
IMM GRANULOCYTES NFR BLD AUTO: 0.4 %
LDLC SERPL CALC-MCNC: 95.6 MG/DL
LYMPHOCYTES # BLD AUTO: 2 K/UL
LYMPHOCYTES NFR BLD: 42.4 %
MCH RBC QN AUTO: 30.1 PG
MCHC RBC AUTO-ENTMCNC: 33.7 G/DL
MCV RBC AUTO: 90 FL
MONOCYTES # BLD AUTO: 0.4 K/UL
MONOCYTES NFR BLD: 9.3 %
NEUTROPHILS # BLD AUTO: 2.1 K/UL
NEUTROPHILS NFR BLD: 43.6 %
NONHDLC SERPL-MCNC: 114 MG/DL
NRBC BLD-RTO: 0 /100 WBC
PLATELET # BLD AUTO: 242 K/UL
PMV BLD AUTO: 9.6 FL
POTASSIUM SERPL-SCNC: 4.2 MMOL/L
PROT SERPL-MCNC: 6.9 G/DL
RBC # BLD AUTO: 4.68 M/UL
SODIUM SERPL-SCNC: 141 MMOL/L
T4 FREE SERPL-MCNC: 1.02 NG/DL
TRIGL SERPL-MCNC: 92 MG/DL
TSH SERPL DL<=0.005 MIU/L-ACNC: 0.37 UIU/ML
WBC # BLD AUTO: 4.72 K/UL

## 2019-03-19 PROCEDURE — 90715 TDAP VACCINE 7 YRS/> IM: CPT | Mod: S$GLB,,, | Performed by: FAMILY MEDICINE

## 2019-03-19 PROCEDURE — 90471 TDAP VACCINE GREATER THAN OR EQUAL TO 7YO IM: ICD-10-PCS | Mod: S$GLB,,, | Performed by: FAMILY MEDICINE

## 2019-03-19 PROCEDURE — 84439 ASSAY OF FREE THYROXINE: CPT

## 2019-03-19 PROCEDURE — 99396 PR PREVENTIVE VISIT,EST,40-64: ICD-10-PCS | Mod: 25,S$GLB,, | Performed by: FAMILY MEDICINE

## 2019-03-19 PROCEDURE — 80061 LIPID PANEL: CPT

## 2019-03-19 PROCEDURE — 80053 COMPREHEN METABOLIC PANEL: CPT

## 2019-03-19 PROCEDURE — 36415 COLL VENOUS BLD VENIPUNCTURE: CPT | Mod: PO

## 2019-03-19 PROCEDURE — 83036 HEMOGLOBIN GLYCOSYLATED A1C: CPT

## 2019-03-19 PROCEDURE — 99999 PR PBB SHADOW E&M-EST. PATIENT-LVL III: CPT | Mod: PBBFAC,,, | Performed by: FAMILY MEDICINE

## 2019-03-19 PROCEDURE — 90715 TDAP VACCINE GREATER THAN OR EQUAL TO 7YO IM: ICD-10-PCS | Mod: S$GLB,,, | Performed by: FAMILY MEDICINE

## 2019-03-19 PROCEDURE — 99396 PREV VISIT EST AGE 40-64: CPT | Mod: 25,S$GLB,, | Performed by: FAMILY MEDICINE

## 2019-03-19 PROCEDURE — 84153 ASSAY OF PSA TOTAL: CPT

## 2019-03-19 PROCEDURE — 90471 IMMUNIZATION ADMIN: CPT | Mod: S$GLB,,, | Performed by: FAMILY MEDICINE

## 2019-03-19 PROCEDURE — 84443 ASSAY THYROID STIM HORMONE: CPT

## 2019-03-19 PROCEDURE — 99999 PR PBB SHADOW E&M-EST. PATIENT-LVL III: ICD-10-PCS | Mod: PBBFAC,,, | Performed by: FAMILY MEDICINE

## 2019-03-19 PROCEDURE — 85025 COMPLETE CBC W/AUTO DIFF WBC: CPT

## 2019-03-19 RX ORDER — SILDENAFIL 100 MG/1
100 TABLET, FILM COATED ORAL DAILY PRN
Qty: 15 TABLET | Refills: 5 | Status: SHIPPED | OUTPATIENT
Start: 2019-03-19 | End: 2021-12-12

## 2019-03-19 RX ORDER — MELOXICAM 15 MG/1
15 TABLET ORAL DAILY
Qty: 60 TABLET | Refills: 5 | Status: SHIPPED | OUTPATIENT
Start: 2019-03-19 | End: 2020-04-29 | Stop reason: SDUPTHER

## 2019-03-19 NOTE — PROGRESS NOTES
Chief Complaint   Patient presents with    Annual Exam       Herbie Naqvi is a 63 y.o. male who presents per the Chief Complaint.  Pt is known to me and was last seen by me on 3/22/2018.  All known chronic medical issues have been documented.       Knee Pain    The incident occurred more than 1 week ago. The injury mechanism was a twisting injury. The pain is present in the right knee. The quality of the pain is described as aching. The pain is at a severity of 6/10. The pain is moderate. The pain has been intermittent since onset. Pertinent negatives include no inability to bear weight, loss of motion, loss of sensation, muscle weakness, numbness or tingling. He reports no foreign bodies present.        ROS  Review of Systems   Constitutional: Negative for activity change, appetite change, chills, fatigue and fever.   HENT: Negative for congestion, ear pain, postnasal drip, rhinorrhea, sinus pressure, sore throat and trouble swallowing.    Eyes: Negative for pain and visual disturbance.   Respiratory: Negative for cough, chest tightness and shortness of breath.    Cardiovascular: Negative for chest pain.   Gastrointestinal: Negative for abdominal pain, constipation, diarrhea, nausea and vomiting.   Genitourinary: Negative for difficulty urinating, dysuria, flank pain, frequency, penile pain, penile swelling, scrotal swelling, testicular pain and urgency.   Musculoskeletal: Positive for arthralgias (right knee). Negative for back pain, gait problem, joint swelling, myalgias, neck pain and neck stiffness.   Skin: Negative.    Allergic/Immunologic: Negative for environmental allergies, food allergies and immunocompromised state.   Neurological: Negative for dizziness, tingling, weakness, light-headedness, numbness and headaches.   Psychiatric/Behavioral: Negative.        Physical Exam  Vitals:    03/19/19 0740   BP: 120/68   Pulse: 61   Resp: 20   Temp: 97.6 °F (36.4 °C)    Body mass index is 28.62 kg/m².  Weight:  "87.9 kg (193 lb 12.6 oz)   Height: 5' 9" (175.3 cm)     Physical Exam   Constitutional: He is oriented to person, place, and time. He appears well-developed and well-nourished. He is active and cooperative.  Non-toxic appearance. He does not have a sickly appearance. He does not appear ill. No distress.   HENT:   Head: Normocephalic and atraumatic.   Right Ear: Hearing, tympanic membrane, external ear and ear canal normal. No tenderness. No foreign bodies. No mastoid tenderness. Tympanic membrane is not injected, not scarred, not perforated, not erythematous, not retracted and not bulging. No hemotympanum. No decreased hearing is noted.   Left Ear: Hearing, tympanic membrane, external ear and ear canal normal. No tenderness. No foreign bodies. No mastoid tenderness. Tympanic membrane is not injected, not scarred, not perforated, not erythematous, not retracted and not bulging. No hemotympanum. No decreased hearing is noted.   Nose: Nose normal. No sinus tenderness, nasal deformity or septal deviation. No epistaxis.  No foreign bodies.   Mouth/Throat: Uvula is midline, oropharynx is clear and moist and mucous membranes are normal. He does not have dentures. Normal dentition. No uvula swelling or dental caries. No oropharyngeal exudate, posterior oropharyngeal edema, posterior oropharyngeal erythema or tonsillar abscesses.   Eyes: Conjunctivae, EOM and lids are normal. Pupils are equal, round, and reactive to light. Right eye exhibits no chemosis, no discharge, no exudate and no hordeolum. No foreign body present in the right eye. Left eye exhibits no chemosis, no discharge, no exudate and no hordeolum. No foreign body present in the left eye. No scleral icterus.   Neck: Normal range of motion and full passive range of motion without pain. Neck supple. No thyroid mass and no thyromegaly present.   Cardiovascular: Normal rate, regular rhythm, S1 normal, S2 normal and normal heart sounds. Exam reveals no gallop and no " friction rub.   No murmur heard.  Pulmonary/Chest: Effort normal and breath sounds normal. He has no decreased breath sounds. He has no wheezes. He has no rhonchi. He has no rales.   Abdominal: Soft. Normal appearance and bowel sounds are normal. He exhibits no distension, no ascites and no mass. There is no hepatosplenomegaly. There is no tenderness. There is no rigidity, no rebound and no guarding. No hernia.   Musculoskeletal: Normal range of motion.        Right knee: He exhibits normal range of motion, no swelling, no effusion, no ecchymosis, no deformity, no laceration, no erythema, normal alignment, no LCL laxity, normal patellar mobility, no bony tenderness, normal meniscus and no MCL laxity. Tenderness found. Medial joint line tenderness noted. No lateral joint line, no MCL, no LCL and no patellar tendon tenderness noted.   Lymphadenopathy:        Head (right side): No submental, no submandibular, no preauricular and no posterior auricular adenopathy present.        Head (left side): No submental, no submandibular, no preauricular and no posterior auricular adenopathy present.     He has no cervical adenopathy.   Neurological: He is alert and oriented to person, place, and time. He has normal strength. He displays no atrophy and no tremor. No cranial nerve deficit or sensory deficit. He exhibits normal muscle tone. He displays no seizure activity.   Skin: Skin is warm, dry and intact. No rash noted. He is not diaphoretic. No pallor.   Psychiatric: He has a normal mood and affect. His speech is normal and behavior is normal. Judgment and thought content normal. Cognition and memory are normal. He is attentive.   Vitals reviewed.      Assessment & Plan    Discussion of plan of care including treatment options regarding health and wellness were reviewed and discussed with patient.  Any changes to medication or treatment plan, as well as any screening blood test, imaging, or referrals to specialist, are  documented.  Follow up as indicated.     1. Well adult exam  - CBC auto differential; Future  - Comprehensive metabolic panel; Future  - Lipid panel; Future  - PSA, Screening; Future  - TSH; Future  - T4, free; Future  - Hemoglobin A1c; Future    2. Complex tear of medial meniscus of right knee as current injury, sequela  - meloxicam (MOBIC) 15 MG tablet; Take 1 tablet (15 mg total) by mouth once daily.  Dispense: 60 tablet; Refill: 5    3. Erectile dysfunction, unspecified erectile dysfunction type  - sildenafil (VIAGRA) 100 MG tablet; Take 1 tablet (100 mg total) by mouth daily as needed for Erectile Dysfunction.  Dispense: 15 tablet; Refill: 5    4. Need for tetanus booster  - (In Office Administered) Tdap Vaccine      Follow up documented    ACTIVE MEDICAL ISSUES:  Documented in Problem List    PAST MEDICAL HISTORY  Documented    PAST SURGICAL HISTORY:  Documented    SOCIAL HISTORY:  Documented    FAMILY HISTORY:  Documented    ALLERGIES AND MEDICATIONS: updated and reviewed.  Documented    Health Maintenance       Date Due Completion Date    TETANUS VACCINE 07/14/1974 ---    Influenza Vaccine 08/01/2018 3/22/2018 (Declined)    Override on 3/22/2018: Declined    Colonoscopy 11/30/2022 11/30/2017    Lipid Panel 03/22/2023 3/22/2018

## 2019-07-09 ENCOUNTER — TELEPHONE (OUTPATIENT)
Dept: FAMILY MEDICINE | Facility: CLINIC | Age: 64
End: 2019-07-09

## 2019-07-09 NOTE — TELEPHONE ENCOUNTER
Spoke with patient requesting refill for Mobic  was told to check with pharmacy first for refill , patient will check with then first , verbalized understand .

## 2019-07-09 NOTE — TELEPHONE ENCOUNTER
----- Message from Vandana Butts sent at 7/9/2019  7:47 AM CDT -----  Contact: Self   Type: Patient Call Back      What is the request in detail:  Pt calling to speak to a nurse regarding his knee pain.    Can the clinic reply by [facility]?no    Would the patient rather a call back or a response via [facility]? call    Best call back number: [phone]

## 2020-03-02 ENCOUNTER — TELEPHONE (OUTPATIENT)
Dept: FAMILY MEDICINE | Facility: CLINIC | Age: 65
End: 2020-03-02

## 2020-03-02 DIAGNOSIS — Z00.00 WELL ADULT EXAM: Primary | ICD-10-CM

## 2020-04-28 ENCOUNTER — TELEPHONE (OUTPATIENT)
Dept: FAMILY MEDICINE | Facility: CLINIC | Age: 65
End: 2020-04-28

## 2020-04-28 NOTE — TELEPHONE ENCOUNTER
----- Message from Bharti Seals sent at 4/28/2020  3:21 PM CDT -----  Contact: Cookie Lynch  Type: Patient Call Back    Who called: Cookie Lynch     What is the request in detail: Cookie Lynch is requesting a call back in regards to the pt appointment tomorrow.    Can the clinic reply by [facility]?    Would the patient rather a call back or a response via [facility]? Call back     Best call back number: [phone]

## 2020-04-29 ENCOUNTER — OFFICE VISIT (OUTPATIENT)
Dept: FAMILY MEDICINE | Facility: CLINIC | Age: 65
End: 2020-04-29
Payer: COMMERCIAL

## 2020-04-29 DIAGNOSIS — S83.231S COMPLEX TEAR OF MEDIAL MENISCUS OF RIGHT KNEE AS CURRENT INJURY, SEQUELA: Primary | ICD-10-CM

## 2020-04-29 DIAGNOSIS — Z00.00 WELL ADULT EXAM: ICD-10-CM

## 2020-04-29 PROCEDURE — 99213 PR OFFICE/OUTPT VISIT, EST, LEVL III, 20-29 MIN: ICD-10-PCS | Mod: 95,,, | Performed by: FAMILY MEDICINE

## 2020-04-29 PROCEDURE — 99213 OFFICE O/P EST LOW 20 MIN: CPT | Mod: 95,,, | Performed by: FAMILY MEDICINE

## 2020-04-29 RX ORDER — MELOXICAM 15 MG/1
15 TABLET ORAL DAILY
Qty: 60 TABLET | Refills: 5 | Status: SHIPPED | OUTPATIENT
Start: 2020-04-29 | End: 2020-10-06 | Stop reason: SDUPTHER

## 2020-04-29 NOTE — PROGRESS NOTES
Herbie Naqvi is a 64 y.o. male.      Visit type: Virtual visit with synchronous audio only  The patient is located outside of this physical clinic but within the State of [place], and a thorough physical exam was not performed.  The chief complaint was presented by patient and discussed during visit and is documented below.    100% of visit was audio counseling, and total visit lasted 15 minutes.     Each patient provided medical services by telemedicine is:  (1) informed of the relationship between the physician and patient and the respective role of any other health care provider with respect to management of the patient; and (2) notified that he or she may decline to receive medical services by telemedicine and may withdraw from such care at any time.  Patient consented to audio only visit.    The reason for the audio only service rather than synchronous audio and video virtual visit was related to technical difficulties or patient preference/necessity.    Notes:    Knee Pain    The incident occurred more than 1 week ago. The incident occurred at home. The injury mechanism was a twisting injury. The pain is present in the right knee. The quality of the pain is described as aching. The pain is at a severity of 4/10. The pain is moderate. The pain has been intermittent since onset. Pertinent negatives include no inability to bear weight, loss of motion, loss of sensation, muscle weakness, numbness or tingling. He reports no foreign bodies present. The symptoms are aggravated by movement and weight bearing. He has tried NSAIDs for the symptoms. The treatment provided moderate relief.        ROS  Review of Systems   Constitutional: Negative.  Negative for activity change, appetite change, chills, diaphoresis, fatigue, fever and unexpected weight change.   HENT: Negative.  Negative for congestion, ear pain, hearing loss, nosebleeds, postnasal drip, rhinorrhea, sinus pressure, sneezing, sore throat and trouble  swallowing.    Eyes: Negative for pain and visual disturbance.   Respiratory: Negative for cough, choking and shortness of breath.    Cardiovascular: Negative for chest pain and leg swelling.   Gastrointestinal: Negative for abdominal pain, constipation, diarrhea, nausea and vomiting.   Genitourinary: Negative for difficulty urinating, dysuria, frequency and urgency.   Musculoskeletal: Positive for arthralgias (right knee) and gait problem (occasionally). Negative for back pain, joint swelling and myalgias.   Skin: Negative.    Allergic/Immunologic: Negative for environmental allergies and food allergies.   Neurological: Negative for dizziness, tingling, seizures, syncope, weakness, light-headedness, numbness and headaches.   Psychiatric/Behavioral: Negative.  Negative for confusion, decreased concentration, dysphoric mood and sleep disturbance. The patient is not nervous/anxious.        Assessment & Plan    Discussion of plan of care including treatment options regarding health and wellness were reviewed and discussed with patient.  Any changes to medication or treatment plan, as well as any screening blood test, imaging, or referrals to specialist, are documented.  Follow up as indicated.     1. Complex tear of medial meniscus of right knee as current injury, sequela  Patient not interested in surgery at this time but was advised tear may progress with daily activity.  Patient will consider surgery later this year.  Advised that when ready, referral to Orthopedic Surgery will be placed for further evaluation.  The current medical regimen will be continued at this time as discussed.   - meloxicam (MOBIC) 15 MG tablet; Take 1 tablet (15 mg total) by mouth once daily.  Dispense: 60 tablet; Refill: 5    2. Well adult exam  Screening test will be ordered and once results available patient will be notified of results and managed accordingly.  Patient is due for annual exam at this time.  - CBC auto differential;  Future  - Comprehensive metabolic panel; Future  - Vitamin D; Future  - TSH; Future  - T4, free; Future  - Hemoglobin A1c; Future  - Lipid panel; Future  - PSA, Screening; Future       Follow up in about 6 weeks (around 6/10/2020).        ACTIVE MEDICAL ISSUES:  Documented in Problem List    PAST MEDICAL HISTORY  Documented    PAST SURGICAL HISTORY:  Documented    SOCIAL HISTORY:  Documented    FAMILY HISTORY:  Documented    ALLERGIES AND MEDICATIONS: updated and reviewed.  Documented    Health Maintenance       Date Due Completion Date    HIV Screening 07/14/1971 ---    Shingles Vaccine (1 of 2) 07/14/2006 ---    Influenza Vaccine (1) 09/01/2019 ---    Colonoscopy 11/30/2022 11/30/2017    Lipid Panel 03/19/2024 3/19/2019    TETANUS VACCINE 03/19/2029 3/19/2019

## 2020-10-05 ENCOUNTER — LAB VISIT (OUTPATIENT)
Dept: LAB | Facility: HOSPITAL | Age: 65
End: 2020-10-05
Attending: FAMILY MEDICINE
Payer: COMMERCIAL

## 2020-10-05 DIAGNOSIS — Z00.00 WELL ADULT EXAM: ICD-10-CM

## 2020-10-05 LAB
25(OH)D3+25(OH)D2 SERPL-MCNC: 34 NG/ML (ref 30–96)
ALBUMIN SERPL BCP-MCNC: 4.2 G/DL (ref 3.5–5.2)
ALP SERPL-CCNC: 67 U/L (ref 55–135)
ALT SERPL W/O P-5'-P-CCNC: 27 U/L (ref 10–44)
ANION GAP SERPL CALC-SCNC: 9 MMOL/L (ref 8–16)
AST SERPL-CCNC: 34 U/L (ref 10–40)
BASOPHILS # BLD AUTO: 0.06 K/UL (ref 0–0.2)
BASOPHILS NFR BLD: 1.5 % (ref 0–1.9)
BILIRUB SERPL-MCNC: 0.4 MG/DL (ref 0.1–1)
BUN SERPL-MCNC: 9 MG/DL (ref 8–23)
CALCIUM SERPL-MCNC: 9.4 MG/DL (ref 8.7–10.5)
CHLORIDE SERPL-SCNC: 106 MMOL/L (ref 95–110)
CHOLEST SERPL-MCNC: 175 MG/DL (ref 120–199)
CHOLEST/HDLC SERPL: 3.5 {RATIO} (ref 2–5)
CO2 SERPL-SCNC: 27 MMOL/L (ref 23–29)
COMPLEXED PSA SERPL-MCNC: 2.4 NG/ML (ref 0–4)
CREAT SERPL-MCNC: 0.8 MG/DL (ref 0.5–1.4)
DIFFERENTIAL METHOD: ABNORMAL
EOSINOPHIL # BLD AUTO: 0.1 K/UL (ref 0–0.5)
EOSINOPHIL NFR BLD: 3 % (ref 0–8)
ERYTHROCYTE [DISTWIDTH] IN BLOOD BY AUTOMATED COUNT: 14.4 % (ref 11.5–14.5)
EST. GFR  (AFRICAN AMERICAN): >60 ML/MIN/1.73 M^2
EST. GFR  (NON AFRICAN AMERICAN): >60 ML/MIN/1.73 M^2
GLUCOSE SERPL-MCNC: 90 MG/DL (ref 70–110)
HCT VFR BLD AUTO: 39.4 % (ref 40–54)
HDLC SERPL-MCNC: 50 MG/DL (ref 40–75)
HDLC SERPL: 28.6 % (ref 20–50)
HGB BLD-MCNC: 12.9 G/DL (ref 14–18)
IMM GRANULOCYTES # BLD AUTO: 0 K/UL (ref 0–0.04)
IMM GRANULOCYTES NFR BLD AUTO: 0 % (ref 0–0.5)
LDLC SERPL CALC-MCNC: 108.8 MG/DL (ref 63–159)
LYMPHOCYTES # BLD AUTO: 1.9 K/UL (ref 1–4.8)
LYMPHOCYTES NFR BLD: 47.9 % (ref 18–48)
MCH RBC QN AUTO: 29.9 PG (ref 27–31)
MCHC RBC AUTO-ENTMCNC: 32.7 G/DL (ref 32–36)
MCV RBC AUTO: 91 FL (ref 82–98)
MONOCYTES # BLD AUTO: 0.3 K/UL (ref 0.3–1)
MONOCYTES NFR BLD: 7.3 % (ref 4–15)
NEUTROPHILS # BLD AUTO: 1.6 K/UL (ref 1.8–7.7)
NEUTROPHILS NFR BLD: 40.3 % (ref 38–73)
NONHDLC SERPL-MCNC: 125 MG/DL
NRBC BLD-RTO: 0 /100 WBC
PLATELET # BLD AUTO: 233 K/UL (ref 150–350)
PMV BLD AUTO: 9.7 FL (ref 9.2–12.9)
POTASSIUM SERPL-SCNC: 4 MMOL/L (ref 3.5–5.1)
PROT SERPL-MCNC: 6.9 G/DL (ref 6–8.4)
RBC # BLD AUTO: 4.31 M/UL (ref 4.6–6.2)
SODIUM SERPL-SCNC: 142 MMOL/L (ref 136–145)
T4 FREE SERPL-MCNC: 0.99 NG/DL (ref 0.71–1.51)
TRIGL SERPL-MCNC: 81 MG/DL (ref 30–150)
TSH SERPL DL<=0.005 MIU/L-ACNC: 0.46 UIU/ML (ref 0.4–4)
WBC # BLD AUTO: 3.97 K/UL (ref 3.9–12.7)

## 2020-10-05 PROCEDURE — 80061 LIPID PANEL: CPT

## 2020-10-05 PROCEDURE — 36415 COLL VENOUS BLD VENIPUNCTURE: CPT | Mod: PO

## 2020-10-05 PROCEDURE — 84439 ASSAY OF FREE THYROXINE: CPT

## 2020-10-05 PROCEDURE — 84153 ASSAY OF PSA TOTAL: CPT

## 2020-10-05 PROCEDURE — 85025 COMPLETE CBC W/AUTO DIFF WBC: CPT

## 2020-10-05 PROCEDURE — 83036 HEMOGLOBIN GLYCOSYLATED A1C: CPT

## 2020-10-05 PROCEDURE — 84443 ASSAY THYROID STIM HORMONE: CPT

## 2020-10-05 PROCEDURE — 82306 VITAMIN D 25 HYDROXY: CPT

## 2020-10-05 PROCEDURE — 80053 COMPREHEN METABOLIC PANEL: CPT

## 2020-10-06 ENCOUNTER — OFFICE VISIT (OUTPATIENT)
Dept: FAMILY MEDICINE | Facility: CLINIC | Age: 65
End: 2020-10-06
Payer: COMMERCIAL

## 2020-10-06 VITALS
SYSTOLIC BLOOD PRESSURE: 130 MMHG | OXYGEN SATURATION: 96 % | RESPIRATION RATE: 16 BRPM | BODY MASS INDEX: 28.9 KG/M2 | HEIGHT: 69 IN | WEIGHT: 195.13 LBS | HEART RATE: 73 BPM | TEMPERATURE: 98 F | DIASTOLIC BLOOD PRESSURE: 86 MMHG

## 2020-10-06 DIAGNOSIS — S83.231S COMPLEX TEAR OF MEDIAL MENISCUS OF RIGHT KNEE AS CURRENT INJURY, SEQUELA: ICD-10-CM

## 2020-10-06 DIAGNOSIS — Z00.00 WELL ADULT EXAM: Primary | ICD-10-CM

## 2020-10-06 DIAGNOSIS — L82.1 RAISED SEBORRHEIC KERATOSIS: ICD-10-CM

## 2020-10-06 LAB
ESTIMATED AVG GLUCOSE: 123 MG/DL (ref 68–131)
HBA1C MFR BLD HPLC: 5.9 % (ref 4–5.6)

## 2020-10-06 PROCEDURE — 3008F BODY MASS INDEX DOCD: CPT | Mod: CPTII,S$GLB,, | Performed by: FAMILY MEDICINE

## 2020-10-06 PROCEDURE — 99999 PR PBB SHADOW E&M-EST. PATIENT-LVL III: ICD-10-PCS | Mod: PBBFAC,,, | Performed by: FAMILY MEDICINE

## 2020-10-06 PROCEDURE — 99999 PR PBB SHADOW E&M-EST. PATIENT-LVL III: CPT | Mod: PBBFAC,,, | Performed by: FAMILY MEDICINE

## 2020-10-06 PROCEDURE — 99396 PREV VISIT EST AGE 40-64: CPT | Mod: S$GLB,,, | Performed by: FAMILY MEDICINE

## 2020-10-06 PROCEDURE — 3008F PR BODY MASS INDEX (BMI) DOCUMENTED: ICD-10-PCS | Mod: CPTII,S$GLB,, | Performed by: FAMILY MEDICINE

## 2020-10-06 PROCEDURE — 99396 PR PREVENTIVE VISIT,EST,40-64: ICD-10-PCS | Mod: S$GLB,,, | Performed by: FAMILY MEDICINE

## 2020-10-06 RX ORDER — MELOXICAM 15 MG/1
15 TABLET ORAL DAILY
Qty: 90 TABLET | Refills: 1 | Status: SHIPPED | OUTPATIENT
Start: 2020-10-06 | End: 2021-12-10 | Stop reason: SDUPTHER

## 2020-10-06 NOTE — PROGRESS NOTES
Chief Complaint   Patient presents with    Annual Exam       Herbie Naqvi is a 65 y.o. male who presents per the Chief Complaint.  Pt is known to me and was last seen by me on 4/29/2020.  All known chronic medical issues have been documented.    Knee Pain   The incident occurred more than 1 week ago. The incident occurred at home. There was no injury mechanism. The pain is present in the right knee and left knee. The quality of the pain is described as aching. The pain is at a severity of 5/10. The pain is moderate. The pain has been worsening since onset. Pertinent negatives include no inability to bear weight, loss of motion, loss of sensation, muscle weakness, numbness or tingling. He reports no foreign bodies present. The symptoms are aggravated by movement. He has tried acetaminophen and NSAIDs for the symptoms. The treatment provided mild relief.       ROS  Review of Systems   Constitutional: Negative for activity change, appetite change, chills, fatigue and fever.   HENT: Negative for congestion, ear pain, postnasal drip, rhinorrhea, sinus pressure, sore throat and trouble swallowing.    Eyes: Negative for pain and visual disturbance.   Respiratory: Negative for cough, chest tightness and shortness of breath.    Cardiovascular: Negative for chest pain.   Gastrointestinal: Negative for abdominal pain, constipation, diarrhea, nausea and vomiting.   Genitourinary: Negative for difficulty urinating, dysuria, flank pain, frequency, penile pain, penile swelling, scrotal swelling, testicular pain and urgency.   Musculoskeletal: Positive for arthralgias (right knee). Negative for back pain, joint swelling, myalgias, neck pain and neck stiffness.   Skin: Negative.         Non tender growth on top of scalp after injury.   Allergic/Immunologic: Negative for environmental allergies, food allergies and immunocompromised state.   Neurological: Negative for dizziness, tingling, weakness, light-headedness, numbness and  "headaches.   Psychiatric/Behavioral: Negative.        Physical Exam  Vitals:    10/06/20 1531   BP: 130/86   Pulse: 73   Resp: 16   Temp: 98.4 °F (36.9 °C)    Body mass index is 28.81 kg/m².  Weight: 88.5 kg (195 lb 1.7 oz)   Height: 5' 9" (175.3 cm)     Physical Exam  Vitals signs reviewed.   Constitutional:       General: He is not in acute distress.     Appearance: Normal appearance. He is well-developed. He is not ill-appearing, toxic-appearing or diaphoretic.   HENT:      Head: Normocephalic and atraumatic.        Right Ear: Hearing, tympanic membrane, ear canal and external ear normal. No decreased hearing noted. No tenderness. No foreign body. No mastoid tenderness. No hemotympanum. Tympanic membrane is not injected, scarred, perforated, erythematous, retracted or bulging.      Left Ear: Hearing, tympanic membrane, ear canal and external ear normal. No decreased hearing noted. No tenderness. No foreign body. No mastoid tenderness. No hemotympanum. Tympanic membrane is not injected, scarred, perforated, erythematous, retracted or bulging.      Nose: Nose normal. No nasal deformity or septal deviation.      Mouth/Throat:      Dentition: Normal dentition. Does not have dentures. No dental caries.      Pharynx: Uvula midline. No oropharyngeal exudate, posterior oropharyngeal erythema or uvula swelling.      Tonsils: No tonsillar abscesses.   Eyes:      General: Lids are normal. No scleral icterus.        Right eye: No foreign body, discharge or hordeolum.         Left eye: No foreign body, discharge or hordeolum.      Conjunctiva/sclera: Conjunctivae normal.      Right eye: No chemosis or exudate.     Left eye: No chemosis or exudate.     Pupils: Pupils are equal, round, and reactive to light.   Neck:      Musculoskeletal: Full passive range of motion without pain, normal range of motion and neck supple.      Thyroid: No thyroid mass or thyromegaly.   Cardiovascular:      Rate and Rhythm: Normal rate and regular " rhythm.      Heart sounds: Normal heart sounds, S1 normal and S2 normal. No murmur. No friction rub. No gallop.    Pulmonary:      Effort: Pulmonary effort is normal.      Breath sounds: Normal breath sounds. No decreased breath sounds, wheezing, rhonchi or rales.   Abdominal:      General: Bowel sounds are normal. There is no distension.      Palpations: Abdomen is soft. Abdomen is not rigid. There is no mass.      Tenderness: There is no abdominal tenderness. There is no guarding or rebound.      Hernia: No hernia is present.   Musculoskeletal: Normal range of motion.      Right knee: He exhibits normal range of motion, no swelling, no effusion, no ecchymosis, no deformity, no laceration, no erythema, normal alignment, no LCL laxity, normal patellar mobility, no bony tenderness, normal meniscus and no MCL laxity. Tenderness found. Medial joint line tenderness noted. No lateral joint line, no MCL, no LCL and no patellar tendon tenderness noted.   Lymphadenopathy:      Head:      Right side of head: No submental, submandibular, preauricular or posterior auricular adenopathy.      Left side of head: No submental, submandibular, preauricular or posterior auricular adenopathy.      Cervical: No cervical adenopathy.   Skin:     General: Skin is warm and dry.      Coloration: Skin is not pale.      Findings: No rash.   Neurological:      Mental Status: He is alert and oriented to person, place, and time.      Cranial Nerves: No cranial nerve deficit.      Sensory: No sensory deficit.      Motor: No tremor, atrophy, abnormal muscle tone or seizure activity.   Psychiatric:         Attention and Perception: He is attentive.         Speech: Speech normal.         Behavior: Behavior normal. Behavior is cooperative.         Thought Content: Thought content normal.         Judgment: Judgment normal.         Assessment & Plan    Discussion of plan of care including treatment options regarding health and wellness were reviewed  and discussed with patient.  Any changes to medication or treatment plan, as well as any screening blood test, imaging, or referrals to specialist, are documented.  Follow up as indicated.     1. Well adult exam  Discussed age and gender appropriate screenings at this visit and encouraged a healthy diet with low saturated fats, and increased physical activity.  Counseled on medically appropriate vaccines based on age and current health condition.  Screening test reviewed and discussed with patient.     2. Complex tear of medial meniscus of right knee as current injury, sequela  Referral to appropriate specialist for evaluation and management placed in Epic.  Patient is interested in surgical repair before the end of the year.    - Ambulatory referral/consult to Sports Medicine; Future  - meloxicam (MOBIC) 15 MG tablet; Take 1 tablet (15 mg total) by mouth once daily.  Dispense: 90 tablet; Refill: 1    3. Raised seborrheic keratosis  Lesion on top of scalp; interferes with haircuts.  Patient states lesion developed after hitting head on a cabinet door.  Recommended topical solution to remove lesion and will consider referral to Dermatology if not effective.    - hydrogen peroxide 40 % Rudy; Apply 4 applications to area with one minute between applications.  May repeat in 3 weeks.  Dispense: 1 each; Refill: 2       Follow up in about 3 months (around 1/6/2021), or if symptoms worsen or fail to improve.      ACTIVE MEDICAL ISSUES:  Documented in Problem List    PAST MEDICAL HISTORY  Documented  .  PAST SURGICAL HISTORY:  Documented    SOCIAL HISTORY:  Documented    FAMILY HISTORY:  Documented    ALLERGIES AND MEDICATIONS: updated and reviewed.  Documented    Health Maintenance       Date Due Completion Date    HIV Screening 07/14/1971 ---    Shingles Vaccine (1 of 2) 07/14/2006 ---    Colorectal Cancer Screening 11/30/2022 11/30/2017    Lipid Panel 10/05/2025 10/5/2020    TETANUS VACCINE 03/19/2029 3/19/2019

## 2020-10-06 NOTE — PROGRESS NOTES
Health Maintenance Due   Topic     Shingles Vaccine (1 of 2) hx chickenpox ; inform pt can get vaccine at pharmacy.

## 2020-10-08 ENCOUNTER — TELEPHONE (OUTPATIENT)
Dept: SPORTS MEDICINE | Facility: CLINIC | Age: 65
End: 2020-10-08

## 2020-10-08 NOTE — TELEPHONE ENCOUNTER
[facility] for patient to see if they wanted to come in today.    Sebastián Hsieh Ms, OTC,   Clinical Assistant to Dr. Rodríguez

## 2020-10-13 NOTE — PROGRESS NOTES
CC: Right knee pain    65 y.o. Male who presents for follow up of right knee osteoarthritis.  Complaint is recurrent pain.  I last saw him nearly 3 years ago for this.  Prior conservative treatment has included corticosteroid injection, therapy, and oral medication.  Previous CSI on 12/21/17. He reports having around 90% relief from around a year following his previous injection. His reports his knee gradually became worse over 6 months ago. He has pain with sitting for long periods of time and getting up from sitting position after long periods of time. His pain localizes over the medial and lateral joint lines. He does report having catching pain over the lateral joint line. He states his pain is 8/10 on average. He does have night pain.  This is a quality of life issue for him.  Currently a full time tobacco user.    No ipsilateral hip pain.  No back pain or radicular complaints.    PMHx is noncontributory otherwise  Positive for tobacco.   Negative for diabetes.   Pain Score:   3  REVIEW OF SYSTEMS:   Constitution: Negative. Negative for chills, fever and night sweats.    Hematologic/Lymphatic: Negative for bleeding problem. Does not bruise/bleed easily.   Skin: Negative for dry skin, itching and rash.   Musculoskeletal: Negative for falls. Positive for right knee pain and muscle weakness.     All other review of symptoms were reviewed and found to be noncontributory.     PAST MEDICAL HISTORY:   History reviewed. No pertinent past medical history.    PAST SURGICAL HISTORY:   Past Surgical History:   Procedure Laterality Date    COLONOSCOPY N/A 11/30/2017    Procedure: COLONOSCOPY;  Surgeon: John Roman MD;  Location: [facility];  Service: Endoscopy;  Laterality: N/A;  colonoscopy confirmed    wrist broke       rt side wrist     FAMILY HISTORY:   Family History   Problem Relation Age of Onset    Diabetes Mother     Heart disease Father     Hyperlipidemia Father      SOCIAL HISTORY:   Social History  "    Socioeconomic History    Marital status:      Spouse name: Not on file    Number of children: Not on file    Years of education: Not on file    Highest education level: Not on file   Occupational History    Not on file   Social Needs    Financial resource strain: Not on file    Food insecurity     Worry: Not on file     Inability: Not on file    Transportation needs     Medical: Not on file     Non-medical: Not on file   Tobacco Use    Smoking status: Former Smoker     Start date: 3/25/2014    Smokeless tobacco: Never Used   Substance and Sexual Activity    Alcohol use: No     Comment: former drinker social stop drinking    Drug use: No    Sexual activity: Not on file   Lifestyle    Physical activity     Days per week: Not on file     Minutes per session: Not on file    Stress: Not on file   Relationships    Social connections     Talks on phone: Not on file     Gets together: Not on file     Attends [employer] service: Not on file     Active member of club or organization: Not on file     Attends meetings of clubs or organizations: Not on file     Relationship status: Not on file   Other Topics Concern    Not on file   Social History Narrative    Not on file     MEDICATIONS:     Current Outpatient Medications:     hydrogen peroxide 40 % Rudy, Apply 4 applications to area with one minute between applications.  May repeat in 3 weeks., Disp: 1 each, Rfl: 2    meloxicam (MOBIC) 15 MG tablet, Take 1 tablet (15 mg total) by mouth once daily., Disp: 90 tablet, Rfl: 1    sildenafil (VIAGRA) 100 MG tablet, Take 1 tablet (100 mg total) by mouth daily as needed for Erectile Dysfunction., Disp: 15 tablet, Rfl: 5    ALLERGIES:   Review of patient's allergies indicates:  No Known Allergies     PHYSICAL EXAMINATION:  /83   Pulse 60   Ht 5' 9" (1.753 m)   Wt 89.1 kg (196 lb 8 oz)   BMI 29.02 kg/m²   General: Well-developed well-nourished 65 y.o. malein no acute distress   Cardiovascular: " Regular rhythm by palpation of distal pulse, normal color and temperature, no concerning varicosities on symptomatic side   Lungs: No labored breathing or wheezing appreciated   Neuro: Alert and oriented ×3   Psychiatric: well oriented to person, place and time, demonstrates normal mood and affect   Skin: No rashes, lesions or ulcers, normal temperature, turgor, and texture on involved extremity    Ortho/SPM Exam  Examination of the right knee demonstrates mild swelling.  No effusion.  Good quadriceps bulk and tone.  Intact extensor mechanism.  Passive range of motion is 5° short of full extension to 120° of flexion.  Ligamentously stable.  Pain and crepitus on range of motion.  Positive patellar grind test.  Medial joint line tenderness.  Standing varus alignment.  2+ dorsalis pedis pulse.  No significant PVD.     IMAGING:  X-rays including standing, weight bearing AP and flexion bilateral knees, RIGHT knee lateral and sunrise views ordered and images reviewed by me show:    Tricompartmental DJD, advanced    ASSESSMENT:      ICD-10-CM ICD-9-CM   1. Primary osteoarthritis of right knee  M17.11 715.16   2. Chronic pain of right knee  M25.561 719.46    G89.29 338.29   3. Pre-op testing  Z01.818 V72.84       PLAN:     Findings discussed with the patient.  Discussed repeat steroid injection versus possible surgery.  The patient wishes to proceed with total knee arthroplasty.  The details of knee replacement were discussed to include the expected postop rehab recovery course.  The patient understands the inherent risk for continued or recurrent pain postop and upwards of 30% of patients, stiffness, infection, DVT/PE, poor wound healing.  Higher risk for infection and poor wound healing given his smoking status.  He understands the importance of smoking cessation prior to surgery.  This was discussed specifically.  He will need preoperative medical clearance.  Dentures in place.  No dental clearance needed.  All questions  answered.  We will see him back for a preop appointment.    Plan for right total knee arthroplasty.    Eliquis 2.5 mg b.i.d. for 2 weeks then aspirin for 2 additional weeks given smoking status.  We will also set him up to have a home SCD machine for 2 weeks.    Informed Consent:    The details of the surgical procedure were explained, including the location of probable incisions and a description of possible hardware and/or grafts to be used. Alternatives to both operative and non-operative options with associated risks and benefits were discussed. The patient understands the likely convalescence after surgery and, in particular, the expected postop rehab and recovery course. The outlined risks and potential complications of the proposed procedure include but are not limited to: infection, poor wound healing, scarring, deformity, stiffness, swelling, continued or recurrent pain, instability, hardware or prosthetic failure if implanted, symptomatic hardware requiring removal, dislocation, weakness, neurovascular injury, numbness, chronic regional pain disorder, tissue nonhealing/irreparability/retear, subsequent contralateral limb injury or pathology, chondral injury, arthritis, fracture, blood clot formation, inability to return to previous level of activity, anesthetic or regional block complication up to death, need for additional procedure as indicated intraoperatively, and potential need for further surgery.    The patient was also informed and understands that the risks of surgery are greater for patients with a current condition or history of heart disease, obesity, clotting disorders, recurrent infections, steroid use, current or past smoking, and factors such as sedentary lifestyle and noncompliance with medications, therapy or follow-up. The degree of the increased risk is hard to estimate with any degree of precision. If applicable, smoking cessation was discussed.     All questions were answered. The  patient has verbalized understanding of these issues and wishes to proceed with the surgery as discussed.      Procedures

## 2020-10-15 ENCOUNTER — OFFICE VISIT (OUTPATIENT)
Dept: SPORTS MEDICINE | Facility: CLINIC | Age: 65
End: 2020-10-15
Payer: COMMERCIAL

## 2020-10-15 ENCOUNTER — HOSPITAL ENCOUNTER (OUTPATIENT)
Dept: RADIOLOGY | Facility: HOSPITAL | Age: 65
Discharge: HOME | End: 2020-10-15
Attending: ORTHOPAEDIC SURGERY
Payer: COMMERCIAL

## 2020-10-15 VITALS
BODY MASS INDEX: 29.1 KG/M2 | WEIGHT: 196.5 LBS | DIASTOLIC BLOOD PRESSURE: 83 MMHG | HEIGHT: 69 IN | SYSTOLIC BLOOD PRESSURE: 137 MMHG | HEART RATE: 60 BPM

## 2020-10-15 DIAGNOSIS — M25.561 RIGHT KNEE PAIN, UNSPECIFIED CHRONICITY: ICD-10-CM

## 2020-10-15 DIAGNOSIS — G89.29 CHRONIC PAIN OF RIGHT KNEE: ICD-10-CM

## 2020-10-15 DIAGNOSIS — M17.11 PRIMARY OSTEOARTHRITIS OF RIGHT KNEE: ICD-10-CM

## 2020-10-15 DIAGNOSIS — M25.561 CHRONIC PAIN OF RIGHT KNEE: ICD-10-CM

## 2020-10-15 DIAGNOSIS — Z01.818 PRE-OP TESTING: ICD-10-CM

## 2020-10-15 DIAGNOSIS — M17.11 PRIMARY OSTEOARTHRITIS OF RIGHT KNEE: Primary | ICD-10-CM

## 2020-10-15 PROCEDURE — 77073 XR HIP TO ANKLE: ICD-10-PCS | Mod: 26,,, | Performed by: RADIOLOGY

## 2020-10-15 PROCEDURE — 99999 PR PBB SHADOW E&M-EST. PATIENT-LVL III: CPT | Mod: PBBFAC,,, | Performed by: ORTHOPAEDIC SURGERY

## 2020-10-15 PROCEDURE — 73564 X-RAY EXAM KNEE 4 OR MORE: CPT | Mod: 26,RT,, | Performed by: RADIOLOGY

## 2020-10-15 PROCEDURE — 99214 OFFICE O/P EST MOD 30 MIN: CPT | Mod: S$GLB,,, | Performed by: ORTHOPAEDIC SURGERY

## 2020-10-15 PROCEDURE — 73562 X-RAY EXAM OF KNEE 3: CPT | Mod: 26,59,LT, | Performed by: RADIOLOGY

## 2020-10-15 PROCEDURE — 3008F BODY MASS INDEX DOCD: CPT | Mod: CPTII,S$GLB,, | Performed by: ORTHOPAEDIC SURGERY

## 2020-10-15 PROCEDURE — 77073 BONE LENGTH STUDIES: CPT | Mod: TC

## 2020-10-15 PROCEDURE — 73562 X-RAY EXAM OF KNEE 3: CPT | Mod: TC,LT

## 2020-10-15 PROCEDURE — 77073 BONE LENGTH STUDIES: CPT | Mod: 26,,, | Performed by: RADIOLOGY

## 2020-10-15 PROCEDURE — 73562 XR KNEE ORTHO RIGHT WITH FLEXION: ICD-10-PCS | Mod: 26,59,LT, | Performed by: RADIOLOGY

## 2020-10-15 PROCEDURE — 3008F PR BODY MASS INDEX (BMI) DOCUMENTED: ICD-10-PCS | Mod: CPTII,S$GLB,, | Performed by: ORTHOPAEDIC SURGERY

## 2020-10-15 PROCEDURE — 99214 PR OFFICE/OUTPT VISIT, EST, LEVL IV, 30-39 MIN: ICD-10-PCS | Mod: S$GLB,,, | Performed by: ORTHOPAEDIC SURGERY

## 2020-10-15 PROCEDURE — 99999 PR PBB SHADOW E&M-EST. PATIENT-LVL III: ICD-10-PCS | Mod: PBBFAC,,, | Performed by: ORTHOPAEDIC SURGERY

## 2020-10-15 PROCEDURE — 73564 XR KNEE ORTHO RIGHT WITH FLEXION: ICD-10-PCS | Mod: 26,RT,, | Performed by: RADIOLOGY

## 2020-10-15 NOTE — LETTER
October 18, 2020      Azikiwe K. Lombard, MD  [address] [place] [address]           [place] [address] [facility] [address]  Phone: [phone]          Patient: Herbie Naqvi   MR Number: [id number]   YOB: 1955   Date of Visit: 10/15/2020       Dear Dr. Azikiwe K. Lombard:    Thank you for referring Herbie Naqvi to me for evaluation. Attached you will find relevant portions of my assessment and plan of care.    If you have questions, please do not hesitate to call me. I look forward to following Herbie Naqvi along with you.    Sincerely,    CAROL Rodríguez MD    Enclosure  CC:  No Recipients    If you would like to receive this communication electronically, please contact [email or web address] or [phone] to request more information on [facility] access.    For providers and/or their staff who would like to refer a patient to [facility], please contact us through our one-stop-shop provider referral line, [facility], at [phone].    If you feel you have received this communication in error or would no longer like to receive these types of communications, please e-mail [email or web address]

## 2020-10-21 DIAGNOSIS — M17.11 OSTEOARTHRITIS OF RIGHT KNEE, UNSPECIFIED OSTEOARTHRITIS TYPE: Primary | ICD-10-CM

## 2020-10-27 ENCOUNTER — TELEPHONE (OUTPATIENT)
Dept: FAMILY MEDICINE | Facility: CLINIC | Age: 65
End: 2020-10-27

## 2020-10-27 DIAGNOSIS — L82.1 RAISED SEBORRHEIC KERATOSIS: ICD-10-CM

## 2020-10-27 NOTE — TELEPHONE ENCOUNTER
----- Message from Lashawn Saunders sent at 10/26/2020  4:48 PM CDT -----  Type: Patient Call Back    Who called: Cris/ spouse    What is the request in detail: spouse calling to check on the status of medication that she states Dr. Lombard stated would send in for bump on the top of pt's head at last appt. Spouse states pharmacy never received.     Can the clinic reply by [facility]? No     Would the patient rather a call back or a response via [facility]? Call back     Best call back number: [phone] (spouse)   or [phone] (pt)    Additional Information:   [facility] [address] [place] [address] - [address] [facility]  [address] [facility] [address]  Phone: [phone] Fax: [phone]        Thank You

## 2020-12-02 ENCOUNTER — TELEPHONE (OUTPATIENT)
Dept: PREADMISSION TESTING | Facility: HOSPITAL | Age: 65
End: 2020-12-02

## 2020-12-02 NOTE — TELEPHONE ENCOUNTER
----- Message from Taryn Traore RN sent at 12/1/2020  2:22 PM CST -----  12/16/20-PLEASE CALL PATIENT AND SCHEDULE POC/LAB     THANKS

## 2020-12-08 ENCOUNTER — TELEPHONE (OUTPATIENT)
Dept: SPORTS MEDICINE | Facility: CLINIC | Age: 65
End: 2020-12-08

## 2020-12-08 ENCOUNTER — OFFICE VISIT (OUTPATIENT)
Dept: FAMILY MEDICINE | Facility: CLINIC | Age: 65
End: 2020-12-08
Payer: COMMERCIAL

## 2020-12-08 ENCOUNTER — OFFICE VISIT (OUTPATIENT)
Dept: SPORTS MEDICINE | Facility: CLINIC | Age: 65
End: 2020-12-08
Payer: COMMERCIAL

## 2020-12-08 VITALS
DIASTOLIC BLOOD PRESSURE: 80 MMHG | WEIGHT: 200 LBS | SYSTOLIC BLOOD PRESSURE: 128 MMHG | BODY MASS INDEX: 29.62 KG/M2 | HEART RATE: 71 BPM | HEIGHT: 69 IN

## 2020-12-08 VITALS
HEART RATE: 59 BPM | HEIGHT: 69 IN | OXYGEN SATURATION: 97 % | RESPIRATION RATE: 16 BRPM | DIASTOLIC BLOOD PRESSURE: 84 MMHG | TEMPERATURE: 98 F | WEIGHT: 200.19 LBS | SYSTOLIC BLOOD PRESSURE: 120 MMHG | BODY MASS INDEX: 29.65 KG/M2

## 2020-12-08 DIAGNOSIS — M17.11 PRIMARY OSTEOARTHRITIS OF RIGHT KNEE: Primary | ICD-10-CM

## 2020-12-08 DIAGNOSIS — S83.231S COMPLEX TEAR OF MEDIAL MENISCUS OF RIGHT KNEE AS CURRENT INJURY, SEQUELA: Primary | ICD-10-CM

## 2020-12-08 DIAGNOSIS — Z01.818 PRE-OP EVALUATION: ICD-10-CM

## 2020-12-08 PROCEDURE — 1125F PR PAIN SEVERITY QUANTIFIED, PAIN PRESENT: ICD-10-PCS | Mod: S$GLB,,, | Performed by: FAMILY MEDICINE

## 2020-12-08 PROCEDURE — 3008F BODY MASS INDEX DOCD: CPT | Mod: CPTII,S$GLB,, | Performed by: PHYSICIAN ASSISTANT

## 2020-12-08 PROCEDURE — 99999 PR PBB SHADOW E&M-EST. PATIENT-LVL IV: CPT | Mod: PBBFAC,,, | Performed by: FAMILY MEDICINE

## 2020-12-08 PROCEDURE — 3008F PR BODY MASS INDEX (BMI) DOCUMENTED: ICD-10-PCS | Mod: CPTII,S$GLB,, | Performed by: PHYSICIAN ASSISTANT

## 2020-12-08 PROCEDURE — 1125F AMNT PAIN NOTED PAIN PRSNT: CPT | Mod: S$GLB,,, | Performed by: PHYSICIAN ASSISTANT

## 2020-12-08 PROCEDURE — 99999 PR PBB SHADOW E&M-EST. PATIENT-LVL III: ICD-10-PCS | Mod: PBBFAC,,, | Performed by: PHYSICIAN ASSISTANT

## 2020-12-08 PROCEDURE — 99499 UNLISTED E&M SERVICE: CPT | Mod: S$GLB,,, | Performed by: PHYSICIAN ASSISTANT

## 2020-12-08 PROCEDURE — 3008F PR BODY MASS INDEX (BMI) DOCUMENTED: ICD-10-PCS | Mod: CPTII,S$GLB,, | Performed by: FAMILY MEDICINE

## 2020-12-08 PROCEDURE — 99999 PR PBB SHADOW E&M-EST. PATIENT-LVL III: CPT | Mod: PBBFAC,,, | Performed by: PHYSICIAN ASSISTANT

## 2020-12-08 PROCEDURE — 1125F PR PAIN SEVERITY QUANTIFIED, PAIN PRESENT: ICD-10-PCS | Mod: S$GLB,,, | Performed by: PHYSICIAN ASSISTANT

## 2020-12-08 PROCEDURE — 99214 PR OFFICE/OUTPT VISIT, EST, LEVL IV, 30-39 MIN: ICD-10-PCS | Mod: S$GLB,,, | Performed by: FAMILY MEDICINE

## 2020-12-08 PROCEDURE — 99214 OFFICE O/P EST MOD 30 MIN: CPT | Mod: S$GLB,,, | Performed by: FAMILY MEDICINE

## 2020-12-08 PROCEDURE — 1125F AMNT PAIN NOTED PAIN PRSNT: CPT | Mod: S$GLB,,, | Performed by: FAMILY MEDICINE

## 2020-12-08 PROCEDURE — 99999 PR PBB SHADOW E&M-EST. PATIENT-LVL IV: ICD-10-PCS | Mod: PBBFAC,,, | Performed by: FAMILY MEDICINE

## 2020-12-08 PROCEDURE — 99499 NO LOS: ICD-10-PCS | Mod: S$GLB,,, | Performed by: PHYSICIAN ASSISTANT

## 2020-12-08 PROCEDURE — 3008F BODY MASS INDEX DOCD: CPT | Mod: CPTII,S$GLB,, | Performed by: FAMILY MEDICINE

## 2020-12-08 RX ORDER — ONDANSETRON 4 MG/1
4 TABLET, FILM COATED ORAL EVERY 8 HOURS PRN
Qty: 21 TABLET | Refills: 0 | Status: SHIPPED | OUTPATIENT
Start: 2020-12-08 | End: 2020-12-15

## 2020-12-08 RX ORDER — MORPHINE SULFATE 10 MG/ML
2 INJECTION, SOLUTION INTRAMUSCULAR; INTRAVENOUS
Status: CANCELLED | OUTPATIENT
Start: 2020-12-08

## 2020-12-08 RX ORDER — OXYCODONE HYDROCHLORIDE 5 MG/1
5 TABLET ORAL EVERY 6 HOURS PRN
Qty: 28 TABLET | Refills: 0 | Status: SHIPPED | OUTPATIENT
Start: 2020-12-08 | End: 2020-12-15

## 2020-12-08 RX ORDER — FENTANYL CITRATE 50 UG/ML
25 INJECTION, SOLUTION INTRAMUSCULAR; INTRAVENOUS EVERY 5 MIN PRN
Status: CANCELLED | OUTPATIENT
Start: 2020-12-08

## 2020-12-08 RX ORDER — TALC
6 POWDER (GRAM) TOPICAL NIGHTLY PRN
Status: CANCELLED | OUTPATIENT
Start: 2020-12-08

## 2020-12-08 RX ORDER — SODIUM CHLORIDE 9 MG/ML
INJECTION, SOLUTION INTRAVENOUS CONTINUOUS
Status: CANCELLED | OUTPATIENT
Start: 2020-12-08 | End: 2020-12-09

## 2020-12-08 RX ORDER — CELECOXIB 100 MG/1
400 CAPSULE ORAL
Status: CANCELLED | OUTPATIENT
Start: 2020-12-08

## 2020-12-08 RX ORDER — FAMOTIDINE 20 MG/1
20 TABLET, FILM COATED ORAL 2 TIMES DAILY
Status: CANCELLED | OUTPATIENT
Start: 2020-12-08

## 2020-12-08 RX ORDER — PREGABALIN 25 MG/1
75 CAPSULE ORAL
Status: CANCELLED | OUTPATIENT
Start: 2020-12-08

## 2020-12-08 RX ORDER — ASPIRIN 81 MG/1
81 TABLET ORAL 2 TIMES DAILY
Qty: 56 TABLET | Refills: 0 | Status: SHIPPED | OUTPATIENT
Start: 2020-12-08 | End: 2020-12-08

## 2020-12-08 RX ORDER — ACETAMINOPHEN 500 MG
1000 TABLET ORAL EVERY 6 HOURS
Status: CANCELLED | OUTPATIENT
Start: 2020-12-08 | End: 2020-12-10

## 2020-12-08 RX ORDER — DEXTROMETHORPHAN HYDROBROMIDE, GUAIFENESIN 5; 100 MG/5ML; MG/5ML
650 LIQUID ORAL EVERY 8 HOURS
Qty: 21 TABLET | Refills: 0 | Status: SHIPPED | OUTPATIENT
Start: 2020-12-08 | End: 2020-12-15

## 2020-12-08 RX ORDER — OXYCODONE HYDROCHLORIDE 5 MG/1
5 TABLET ORAL
Status: CANCELLED | OUTPATIENT
Start: 2020-12-08

## 2020-12-08 RX ORDER — MIDAZOLAM HYDROCHLORIDE 1 MG/ML
1 INJECTION INTRAMUSCULAR; INTRAVENOUS EVERY 5 MIN PRN
Status: CANCELLED | OUTPATIENT
Start: 2020-12-08

## 2020-12-08 RX ORDER — ROPIVACAINE HYDROCHLORIDE 2 MG/ML
8 INJECTION, SOLUTION EPIDURAL; INFILTRATION; PERINEURAL CONTINUOUS
Status: CANCELLED | OUTPATIENT
Start: 2020-12-08

## 2020-12-08 RX ORDER — POLYETHYLENE GLYCOL 3350 17 G/17G
17 POWDER, FOR SOLUTION ORAL DAILY
Status: CANCELLED | OUTPATIENT
Start: 2020-12-08

## 2020-12-08 RX ORDER — ACETAMINOPHEN 500 MG
1000 TABLET ORAL
Status: CANCELLED | OUTPATIENT
Start: 2020-12-08

## 2020-12-08 RX ORDER — ASPIRIN 81 MG/1
81 TABLET ORAL 2 TIMES DAILY
Status: CANCELLED | OUTPATIENT
Start: 2020-12-08

## 2020-12-08 RX ORDER — ONDANSETRON 2 MG/ML
4 INJECTION INTRAMUSCULAR; INTRAVENOUS EVERY 8 HOURS PRN
Status: CANCELLED | OUTPATIENT
Start: 2020-12-08

## 2020-12-08 RX ORDER — ASPIRIN 81 MG/1
81 TABLET ORAL 2 TIMES DAILY
Qty: 28 TABLET | Refills: 0 | Status: SHIPPED | OUTPATIENT
Start: 2020-12-08 | End: 2020-12-09

## 2020-12-08 RX ORDER — OXYCODONE HYDROCHLORIDE 5 MG/1
10 TABLET ORAL
Status: CANCELLED | OUTPATIENT
Start: 2020-12-08

## 2020-12-08 RX ORDER — CELECOXIB 100 MG/1
200 CAPSULE ORAL DAILY
Status: CANCELLED | OUTPATIENT
Start: 2020-12-08

## 2020-12-08 RX ORDER — AMOXICILLIN 250 MG
1 CAPSULE ORAL 2 TIMES DAILY
Status: CANCELLED | OUTPATIENT
Start: 2020-12-08

## 2020-12-08 RX ORDER — PREGABALIN 25 MG/1
75 CAPSULE ORAL NIGHTLY
Status: CANCELLED | OUTPATIENT
Start: 2020-12-08

## 2020-12-08 RX ORDER — LIDOCAINE HYDROCHLORIDE 10 MG/ML
1 INJECTION, SOLUTION EPIDURAL; INFILTRATION; INTRACAUDAL; PERINEURAL
Status: CANCELLED | OUTPATIENT
Start: 2020-12-08

## 2020-12-08 RX ORDER — NALOXONE HCL 0.4 MG/ML
0.02 VIAL (ML) INJECTION
Status: CANCELLED | OUTPATIENT
Start: 2020-12-08

## 2020-12-08 RX ORDER — SODIUM CHLORIDE 9 MG/ML
INJECTION, SOLUTION INTRAVENOUS
Status: CANCELLED | OUTPATIENT
Start: 2020-12-08

## 2020-12-08 RX ORDER — MUPIROCIN 20 MG/G
1 OINTMENT TOPICAL 2 TIMES DAILY
Status: CANCELLED | OUTPATIENT
Start: 2020-12-08 | End: 2020-12-13

## 2020-12-08 RX ORDER — CELECOXIB 200 MG/1
200 CAPSULE ORAL DAILY
Qty: 14 CAPSULE | Refills: 0 | Status: SHIPPED | OUTPATIENT
Start: 2020-12-08 | End: 2020-12-22

## 2020-12-08 RX ORDER — SODIUM CHLORIDE 0.9 % (FLUSH) 0.9 %
10 SYRINGE (ML) INJECTION
Status: CANCELLED | OUTPATIENT
Start: 2020-12-08

## 2020-12-08 RX ORDER — MUPIROCIN 20 MG/G
1 OINTMENT TOPICAL
Status: CANCELLED | OUTPATIENT
Start: 2020-12-08

## 2020-12-08 RX ORDER — BISACODYL 10 MG
10 SUPPOSITORY, RECTAL RECTAL EVERY 12 HOURS PRN
Status: CANCELLED | OUTPATIENT
Start: 2020-12-08

## 2020-12-08 NOTE — H&P
"Herbie Naqvi  is here for a completion of his perioperative paperwork. he  Is scheduled to undergo Right Total Knee Arthroplasty on 12/16/2020.  He does need clearance for this procedure.     PCP clearance: "Based on physical exam today, Herbie Naqvi is cleared to undergo planned surgery"    Risks, indications and benefits of the surgical procedure were discussed with the patient. All questions with regard to surgery, rehab, expected return to functional activities, activities of daily living and recreational endeavors were answered to his satisfaction.    Discussed COVID-19 with the patient, they are aware of our current policies and procedures, were given the option of delaying surgery, and they elect to proceed.    Patient was informed and understands the risks of surgery are greater for patients with a current condition or hx of heart disease, obesity, clotting disorders, recurrent infections, steroid use, current or past smoking, and factors such as sedentary lifestyle and noncompliance with medications, therapy or f/u. The degree of the increased risk is hard to estimate w/ any degree of precision.    Once no other questions were asked, a brief history and physical exam was then performed.    PAST MEDICAL HISTORY: No past medical history on file.  PAST SURGICAL HISTORY:   Past Surgical History:   Procedure Laterality Date    COLONOSCOPY N/A 11/30/2017    Procedure: COLONOSCOPY;  Surgeon: John Roman MD;  Location: [facility];  Service: Endoscopy;  Laterality: N/A;  colonoscopy confirmed    wrist broke       rt side wrist     FAMILY HISTORY:   Family History   Problem Relation Age of Onset    Diabetes Mother     Heart disease Father     Hyperlipidemia Father      SOCIAL HISTORY:   Social History     Socioeconomic History    Marital status:      Spouse name: Not on file    Number of children: Not on file    Years of education: Not on file    Highest education level: Not on file "   Occupational History    Not on file   Social Needs    Financial resource strain: Not on file    Food insecurity     Worry: Not on file     Inability: Not on file    Transportation needs     Medical: Not on file     Non-medical: Not on file   Tobacco Use    Smoking status: Former Smoker     Start date: 3/25/2014    Smokeless tobacco: Never Used   Substance and Sexual Activity    Alcohol use: No     Comment: former drinker social stop drinking    Drug use: No    Sexual activity: Not on file   Lifestyle    Physical activity     Days per week: Not on file     Minutes per session: Not on file    Stress: Not on file   Relationships    Social connections     Talks on phone: Not on file     Gets together: Not on file     Attends Judaism service: Not on file     Active member of club or organization: Not on file     Attends meetings of clubs or organizations: Not on file     Relationship status: Not on file   Other Topics Concern    Not on file   Social History Narrative    Not on file       MEDICATIONS:   Current Outpatient Medications:     acetaminophen (TYLENOL) 650 MG TbSR, Take 1 tablet (650 mg total) by mouth every 8 (eight) hours. for 7 days, Disp: 21 tablet, Rfl: 0    apixaban (ELIQUIS) 2.5 mg Tab, Take 1 tablet (2.5 mg total) by mouth 2 (two) times daily. for 14 days, Disp: 28 tablet, Rfl: 0    aspirin (ECOTRIN) 81 MG EC tablet, Take 1 tablet (81 mg total) by mouth 2 (two) times daily. for 14 days, Disp: 28 tablet, Rfl: 0    celecoxib (CELEBREX) 200 MG capsule, Take 1 capsule (200 mg total) by mouth once daily. for 14 days, Disp: 14 capsule, Rfl: 0    hydrogen peroxide 40 % Rudy, Apply 4 applications to area with one minute between applications.  May repeat in 3 weeks., Disp: 1 each, Rfl: 2    meloxicam (MOBIC) 15 MG tablet, Take 1 tablet (15 mg total) by mouth once daily., Disp: 90 tablet, Rfl: 1    ondansetron (ZOFRAN) 4 MG tablet, Take 1 tablet (4 mg total) by mouth every 8 (eight) hours  as needed., Disp: 21 tablet, Rfl: 0    oxyCODONE (ROXICODONE) 5 MG immediate release tablet, Take 1 tablet (5 mg total) by mouth every 6 (six) hours as needed., Disp: 28 tablet, Rfl: 0    sildenafil (VIAGRA) 100 MG tablet, Take 1 tablet (100 mg total) by mouth daily as needed for Erectile Dysfunction., Disp: 15 tablet, Rfl: 5  ALLERGIES: Review of patient's allergies indicates:  No Known Allergies    Review of Systems   Constitution: Negative. Negative for chills, fever and night sweats.   HENT: Negative for congestion and headaches.    Eyes: Negative for blurred vision, left vision loss and right vision loss.   Cardiovascular: Negative for chest pain and syncope.   Respiratory: Negative for cough and shortness of breath.    Endocrine: Negative for polydipsia, polyphagia and polyuria.   Hematologic/Lymphatic: Negative for bleeding problem. Does not bruise/bleed easily.   Skin: Negative for dry skin, itching and rash.   Musculoskeletal: Negative for falls and muscle weakness.   Gastrointestinal: Negative for abdominal pain and bowel incontinence.   Genitourinary: Negative for bladder incontinence and nocturia.   Neurological: Negative for disturbances in coordination, loss of balance and seizures.   Psychiatric/Behavioral: Negative for depression. The patient does not have insomnia.    Allergic/Immunologic: Negative for hives and persistent infections.     PHYSICAL EXAM:  GEN: A&Ox3, WD WN NAD  HEENT: WNL  CHEST: CTAB, no W/R/R  HEART: RRR, no M/R/G   ABD: Soft, NT ND, BS x4 QUADS  MS: Refer to previous note for detailed MS exam  NEURO: CN II-XII intact       The surgical consent was then reviewed with the patient, who agreed with all the contents of the consent form and it was signed.     Due to the serious nature of total joint infection and the high prevalence of community acquired MRSA, vancomycin will be used perioperatively.     PHYSICAL THERAPY:  He was also instructed regarding physical therapy and will  begin on POD#1-3. He is doing physical therapy at [facility].    POST OP CARE: Instructions were reviewed including care of the wound and dressing after surgery and when he can shower.     PAIN MANAGEMENT: Herbie Naqvi was instructed regarding the Polar ice unit that will be in place after surgery and his postoperative pain medications.     MEDICATION:  Roxicodone 5 mg 1-2 q 4 hours PRN for pain  Zofran 4 mg q 8 hours PRN for nausea and vomiting.  Aspirin 81mg BID x 4 weeks for DVT prophylaxis starting on the evening after surgery.  Tylenol 650mg PO q8h x 7 days  Celebrex 200mg PO once daily x 14 days    Post op meds to be delivered bedside prior to discharge. Deliver to family if patient is in surgery at 5pm.     Patient was instructed to purchase and take Colace to counter possible GI side effects of taking opiates.     DVT prophylaxis was discussed with the patient today including risk factors for developing DVTs and history of DVTs. The patient was asked if any specific recommendations were given from the doctor/s that did pre-operative surgical clearance.      If the patient was previously taking 81mg baby aspirin, they were told to not take additional baby aspirin, using the above stated aspirin and to restart the 81mg aspirin daily after completion of the aspirin dose.      Patient was also told to buy over the counter Prilosec medication and take it once daily for GI protection as long as they are taking NSAIDs or Aspirin.     The patient was told that narcotic pain medications may make them drowsy and instructions were given to not sign legal documents, drive or operate heavy machinery, cars, or equipment while under the influence of narcotic medications.     Dr. Rodríguez was present in clinic during this pre-op evaluation. The patient was offered the opportunity to ask Dr. Rodríguez any further questions regarding the procedure which may not have been addressed during their  previous informed consent discussion. The patient has declined to see Dr. Rodríguez today.    As there were no other questions to be asked, he was given my business card along with Dr. Rodríguez's business card if he has any questions or concerns prior to surgery or in the postop period.     I explained to following and the patient expressed understanding:  The patient is currently aware of the COVID19 pandemic and that proceeding with their surgical procedure could potentially increase exposure to coronavirus in the community. The patient understands that there is the possibility of delayed or cancelled appts or PT visits in the future. They understand that infection with the coronavirus could complicate their surgery recovery. They are aware of the current policies and procedures of [facility] and the government regarding the pandemic and they were given the option of delaying my surgery. The patient elects to proceed with surgery at this time.      Patient denies having any symptoms such as cough, SOB, fever, loss of taste/smell.     The patient has been scheduled to undergo coronavirus testing on the day prior to surgery.      The patient was instructed to practice strict social distancing, hand washing/hygiene, respiratory hygiene, and cough etiquette from now until 6 weeks following surgery to reduce the risk of harvey coronavirus.

## 2020-12-08 NOTE — PROGRESS NOTES
Chief Complaint   Patient presents with    Pre-op Exam     right knee surgery 12/16/2020       Herbie Naqvi is a 65 y.o. male who presents per the Chief Complaint.  Pt is known to me and was last seen by me on 10/6/2020.  All known chronic medical issues have been documented.    Knee Pain   The incident occurred more than 1 week ago. The incident occurred at home. There was no injury mechanism. The pain is present in the right knee and left knee. The quality of the pain is described as aching. The pain is at a severity of 5/10. The pain is moderate. The pain has been worsening since onset. Pertinent negatives include no inability to bear weight, loss of motion, loss of sensation, muscle weakness, numbness or tingling. He reports no foreign bodies present. The symptoms are aggravated by movement. He has tried acetaminophen and NSAIDs for the symptoms. The treatment provided mild relief.       ROS  Review of Systems   Constitutional: Negative.  Negative for activity change, appetite change, chills, diaphoresis, fatigue, fever and unexpected weight change.   HENT: Negative.  Negative for congestion, ear pain, hearing loss, nosebleeds, postnasal drip, rhinorrhea, sinus pressure, sneezing, sore throat and trouble swallowing.    Eyes: Negative for pain and visual disturbance.   Respiratory: Negative for cough, choking and shortness of breath.    Cardiovascular: Negative for chest pain and leg swelling.   Gastrointestinal: Negative for abdominal pain, constipation, diarrhea, nausea and vomiting.   Genitourinary: Negative for difficulty urinating, dysuria, frequency and urgency.   Musculoskeletal: Positive for arthralgias (right knee) and gait problem (occasionally). Negative for back pain, joint swelling and myalgias.   Skin: Negative.    Allergic/Immunologic: Negative for environmental allergies and food allergies.   Neurological: Negative for dizziness, tingling, seizures, syncope, weakness, light-headedness, numbness  "and headaches.   Psychiatric/Behavioral: Negative.  Negative for confusion, decreased concentration, dysphoric mood and sleep disturbance. The patient is not nervous/anxious.      Physical Exam  Vitals:    12/08/20 1145   BP: 120/84   Pulse: (!) 59   Resp: 16   Temp: 98.1 °F (36.7 °C)    Body mass index is 29.56 kg/m².  Weight: 90.8 kg (200 lb 2.8 oz)   Height: 5' 9" (175.3 cm)     Physical Exam  Constitutional:       General: He is not in acute distress.     Appearance: Normal appearance. He is well-developed. He is not ill-appearing, toxic-appearing or diaphoretic.   HENT:      Head: Normocephalic and atraumatic.      Right Ear: Hearing and external ear normal. No decreased hearing noted.      Left Ear: Hearing and external ear normal. No decreased hearing noted.      Nose: Nose normal. No nasal deformity or rhinorrhea.      Mouth/Throat:      Dentition: Normal dentition. Does not have dentures.      Pharynx: Uvula midline.   Eyes:      General: Lids are normal. No scleral icterus.        Right eye: No foreign body or discharge.         Left eye: No foreign body or discharge.      Conjunctiva/sclera: Conjunctivae normal.      Right eye: No chemosis or exudate.     Left eye: No chemosis or exudate.     Pupils: Pupils are equal, round, and reactive to light.   Neck:      Musculoskeletal: Full passive range of motion without pain, normal range of motion and neck supple.   Cardiovascular:      Rate and Rhythm: Normal rate and regular rhythm.      Heart sounds: Normal heart sounds, S1 normal and S2 normal. No murmur. No friction rub. No gallop.    Pulmonary:      Effort: Pulmonary effort is normal. No accessory muscle usage or respiratory distress.      Breath sounds: Normal breath sounds. No decreased breath sounds, wheezing, rhonchi or rales.   Abdominal:      General: There is no distension.      Palpations: Abdomen is soft. Abdomen is not rigid.      Tenderness: There is no abdominal tenderness. There is no " guarding or rebound.   Musculoskeletal:      Right knee: He exhibits decreased range of motion. He exhibits no swelling, no effusion, no ecchymosis, no deformity, no laceration, no erythema, normal alignment, no LCL laxity, normal patellar mobility, no bony tenderness, normal meniscus and no MCL laxity. Tenderness found. Medial joint line tenderness noted. No lateral joint line, no MCL, no LCL and no patellar tendon tenderness noted.      Left knee: He exhibits normal range of motion, no swelling, no effusion, no ecchymosis, no deformity, no laceration, no erythema, normal alignment and no LCL laxity. Tenderness found. Patellar tendon tenderness noted.   Skin:     General: Skin is warm and dry.      Findings: No rash.   Neurological:      Mental Status: He is alert and oriented to person, place, and time.      Cranial Nerves: No cranial nerve deficit.      Sensory: No sensory deficit.      Motor: No abnormal muscle tone or seizure activity.      Coordination: Coordination normal.      Gait: Gait normal.   Psychiatric:         Attention and Perception: He is attentive.         Speech: Speech normal.         Behavior: Behavior normal. Behavior is cooperative.         Thought Content: Thought content normal.         Judgment: Judgment normal.       Assessment & Plan    Discussion of plan of care including treatment options regarding health and wellness were reviewed and discussed with patient.  Any changes to medication or treatment plan, as well as any screening blood test, imaging, or referrals to specialist, are documented.  Follow up as indicated.     1. Complex tear of medial meniscus of right knee as current injury, sequela  Managed by Orthopedics.  Patient is scheduled for surgical repair next week.    2. Pre-op evaluation  Based on physical exam today, Herbie Naqvi is cleared to undergo planned surgery.  I will review results of ordered test and if necessary, discuss with surgeon any appropriate action  necessary.  I will submit all necessary clinical information to the surgeon.  Patient is encouraged to follow up after surgery to discuss results and manage as necessary.        Follow up in about 6 months (around 6/8/2021).      ACTIVE MEDICAL ISSUES:  Documented in Problem List    PAST MEDICAL HISTORY  Documented    PAST SURGICAL HISTORY:  Documented    SOCIAL HISTORY:  Documented    FAMILY HISTORY:  Documented    ALLERGIES AND MEDICATIONS: updated and reviewed.  Documented    Health Maintenance       Date Due Completion Date    HIV Screening 07/14/1970 ---    Shingles Vaccine (1 of 2) 07/14/2006 ---    Colorectal Cancer Screening 11/30/2022 11/30/2017    Lipid Panel 10/05/2025 10/5/2020    TETANUS VACCINE 03/19/2029 3/19/2019

## 2020-12-09 ENCOUNTER — TELEPHONE (OUTPATIENT)
Dept: SPORTS MEDICINE | Facility: CLINIC | Age: 65
End: 2020-12-09

## 2020-12-09 RX ORDER — ASPIRIN 81 MG/1
81 TABLET ORAL 2 TIMES DAILY
Qty: 56 TABLET | Refills: 0 | Status: SHIPPED | OUTPATIENT
Start: 2020-12-09 | End: 2021-12-10

## 2020-12-09 NOTE — TELEPHONE ENCOUNTER
Spoke with patient. He was scheduled for TKA on 12/16/20. 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 I said I would relay this message to Dr. Rodríguez.    Sebastián Hsieh, Ms, OTC,   Clinical Assistant to Dr. Rodríguez

## 2020-12-09 NOTE — TELEPHONE ENCOUNTER
I called and spoke to the patient and his wife this evening regarding the surgical plan.  I did not get a chance to see him at his preoperative appointment today.  We discussed the details of total knee arthroplasty to include the expected postop rehab and recovery course.  I have counseled them on the risks of surgery which include continued or recurrent pain and upwards of 30% of patients, stiffness, DVT/PE, infection, aseptic loosening, and fracture among others.  He states that he actually has not been smoking for about a year.  Plan for DVT prophylaxis will changed to aspirin only for 4 weeks.  No Eliquis.  I have discussed this with my team.

## 2020-12-09 NOTE — TELEPHONE ENCOUNTER
The patient contacted our clinic today wanting to cancel his case for next week period he states that he talked things over with his wife and does not think that he can take the necessary time away from work to have surgery right now.  I answered all of his questions and he will let us know when he would like to proceed.  I am happy to see him back at his convenience.

## 2021-10-12 ENCOUNTER — TELEPHONE (OUTPATIENT)
Dept: FAMILY MEDICINE | Facility: CLINIC | Age: 66
End: 2021-10-12
Payer: MEDICARE

## 2021-11-01 DIAGNOSIS — S83.231S COMPLEX TEAR OF MEDIAL MENISCUS OF RIGHT KNEE AS CURRENT INJURY, SEQUELA: Primary | ICD-10-CM

## 2021-11-03 ENCOUNTER — TELEPHONE (OUTPATIENT)
Dept: FAMILY MEDICINE | Facility: CLINIC | Age: 66
End: 2021-11-03
Payer: MEDICARE

## 2021-11-11 DIAGNOSIS — M25.561 RIGHT KNEE PAIN, UNSPECIFIED CHRONICITY: Primary | ICD-10-CM

## 2021-11-15 ENCOUNTER — APPOINTMENT (OUTPATIENT)
Dept: RADIOLOGY | Facility: HOSPITAL | Age: 66
End: 2021-11-15
Attending: ORTHOPAEDIC SURGERY
Payer: MEDICARE

## 2021-11-15 ENCOUNTER — OFFICE VISIT (OUTPATIENT)
Dept: ORTHOPEDICS | Facility: CLINIC | Age: 66
End: 2021-11-15
Payer: MEDICARE

## 2021-11-15 VITALS
DIASTOLIC BLOOD PRESSURE: 80 MMHG | OXYGEN SATURATION: 98 % | HEIGHT: 71 IN | WEIGHT: 181 LBS | BODY MASS INDEX: 25.34 KG/M2 | HEART RATE: 71 BPM | SYSTOLIC BLOOD PRESSURE: 120 MMHG | RESPIRATION RATE: 18 BRPM

## 2021-11-15 DIAGNOSIS — M25.561 RIGHT KNEE PAIN, UNSPECIFIED CHRONICITY: ICD-10-CM

## 2021-11-15 DIAGNOSIS — M17.11 PRIMARY OSTEOARTHRITIS OF RIGHT KNEE: Primary | ICD-10-CM

## 2021-11-15 PROCEDURE — 99999 PR PBB SHADOW E&M-EST. PATIENT-LVL III: CPT | Mod: PBBFAC,HCNC,, | Performed by: ORTHOPAEDIC SURGERY

## 2021-11-15 PROCEDURE — 3074F SYST BP LT 130 MM HG: CPT | Mod: HCNC,CPTII,S$GLB, | Performed by: ORTHOPAEDIC SURGERY

## 2021-11-15 PROCEDURE — 73564 X-RAY EXAM KNEE 4 OR MORE: CPT | Mod: 26,HCNC,RT, | Performed by: RADIOLOGY

## 2021-11-15 PROCEDURE — 1159F PR MEDICATION LIST DOCUMENTED IN MEDICAL RECORD: ICD-10-PCS | Mod: HCNC,CPTII,S$GLB, | Performed by: ORTHOPAEDIC SURGERY

## 2021-11-15 PROCEDURE — 20610 LARGE JOINT ASPIRATION/INJECTION: R KNEE: ICD-10-PCS | Mod: HCNC,RT,S$GLB, | Performed by: ORTHOPAEDIC SURGERY

## 2021-11-15 PROCEDURE — 73564 X-RAY EXAM KNEE 4 OR MORE: CPT | Mod: TC,HCNC,FY,PN,RT

## 2021-11-15 PROCEDURE — 99203 PR OFFICE/OUTPT VISIT, NEW, LEVL III, 30-44 MIN: ICD-10-PCS | Mod: HCNC,25,S$GLB, | Performed by: ORTHOPAEDIC SURGERY

## 2021-11-15 PROCEDURE — 20610 DRAIN/INJ JOINT/BURSA W/O US: CPT | Mod: HCNC,RT,S$GLB, | Performed by: ORTHOPAEDIC SURGERY

## 2021-11-15 PROCEDURE — 73564 XR KNEE COMP 4 OR MORE VIEWS RIGHT: ICD-10-PCS | Mod: 26,HCNC,RT, | Performed by: RADIOLOGY

## 2021-11-15 PROCEDURE — 1159F MED LIST DOCD IN RCRD: CPT | Mod: HCNC,CPTII,S$GLB, | Performed by: ORTHOPAEDIC SURGERY

## 2021-11-15 PROCEDURE — 99203 OFFICE O/P NEW LOW 30 MIN: CPT | Mod: HCNC,25,S$GLB, | Performed by: ORTHOPAEDIC SURGERY

## 2021-11-15 PROCEDURE — 3008F BODY MASS INDEX DOCD: CPT | Mod: HCNC,CPTII,S$GLB, | Performed by: ORTHOPAEDIC SURGERY

## 2021-11-15 PROCEDURE — 3079F PR MOST RECENT DIASTOLIC BLOOD PRESSURE 80-89 MM HG: ICD-10-PCS | Mod: HCNC,CPTII,S$GLB, | Performed by: ORTHOPAEDIC SURGERY

## 2021-11-15 PROCEDURE — 3288F FALL RISK ASSESSMENT DOCD: CPT | Mod: HCNC,CPTII,S$GLB, | Performed by: ORTHOPAEDIC SURGERY

## 2021-11-15 PROCEDURE — 3288F PR FALLS RISK ASSESSMENT DOCUMENTED: ICD-10-PCS | Mod: HCNC,CPTII,S$GLB, | Performed by: ORTHOPAEDIC SURGERY

## 2021-11-15 PROCEDURE — 99999 PR PBB SHADOW E&M-EST. PATIENT-LVL III: ICD-10-PCS | Mod: PBBFAC,HCNC,, | Performed by: ORTHOPAEDIC SURGERY

## 2021-11-15 PROCEDURE — 1101F PT FALLS ASSESS-DOCD LE1/YR: CPT | Mod: HCNC,CPTII,S$GLB, | Performed by: ORTHOPAEDIC SURGERY

## 2021-11-15 PROCEDURE — 3079F DIAST BP 80-89 MM HG: CPT | Mod: HCNC,CPTII,S$GLB, | Performed by: ORTHOPAEDIC SURGERY

## 2021-11-15 PROCEDURE — 1101F PR PT FALLS ASSESS DOC 0-1 FALLS W/OUT INJ PAST YR: ICD-10-PCS | Mod: HCNC,CPTII,S$GLB, | Performed by: ORTHOPAEDIC SURGERY

## 2021-11-15 PROCEDURE — 3074F PR MOST RECENT SYSTOLIC BLOOD PRESSURE < 130 MM HG: ICD-10-PCS | Mod: HCNC,CPTII,S$GLB, | Performed by: ORTHOPAEDIC SURGERY

## 2021-11-15 PROCEDURE — 3008F PR BODY MASS INDEX (BMI) DOCUMENTED: ICD-10-PCS | Mod: HCNC,CPTII,S$GLB, | Performed by: ORTHOPAEDIC SURGERY

## 2021-11-15 RX ORDER — TRIAMCINOLONE ACETONIDE 40 MG/ML
40 INJECTION, SUSPENSION INTRA-ARTICULAR; INTRAMUSCULAR
Status: DISCONTINUED | OUTPATIENT
Start: 2021-11-15 | End: 2021-11-15 | Stop reason: HOSPADM

## 2021-11-15 RX ADMIN — TRIAMCINOLONE ACETONIDE 40 MG: 40 INJECTION, SUSPENSION INTRA-ARTICULAR; INTRAMUSCULAR at 08:11

## 2021-12-10 ENCOUNTER — OFFICE VISIT (OUTPATIENT)
Dept: FAMILY MEDICINE | Facility: CLINIC | Age: 66
End: 2021-12-10
Payer: MEDICARE

## 2021-12-10 VITALS
HEIGHT: 71 IN | RESPIRATION RATE: 16 BRPM | BODY MASS INDEX: 25.12 KG/M2 | SYSTOLIC BLOOD PRESSURE: 110 MMHG | HEART RATE: 73 BPM | TEMPERATURE: 98 F | WEIGHT: 179.44 LBS | OXYGEN SATURATION: 97 % | DIASTOLIC BLOOD PRESSURE: 80 MMHG

## 2021-12-10 DIAGNOSIS — Z86.39 PERSONAL HISTORY OF OTHER ENDOCRINE, NUTRITIONAL AND METABOLIC DISEASE: ICD-10-CM

## 2021-12-10 DIAGNOSIS — Z13.6 SCREENING FOR CARDIOVASCULAR CONDITION: ICD-10-CM

## 2021-12-10 DIAGNOSIS — M17.11 PRIMARY OSTEOARTHRITIS OF RIGHT KNEE: Primary | ICD-10-CM

## 2021-12-10 DIAGNOSIS — Z12.5 SPECIAL SCREENING FOR MALIGNANT NEOPLASM OF PROSTATE: ICD-10-CM

## 2021-12-10 DIAGNOSIS — E55.9 VITAMIN D INSUFFICIENCY: ICD-10-CM

## 2021-12-10 DIAGNOSIS — S83.231S COMPLEX TEAR OF MEDIAL MENISCUS OF RIGHT KNEE AS CURRENT INJURY, SEQUELA: ICD-10-CM

## 2021-12-10 PROCEDURE — 99999 PR PBB SHADOW E&M-EST. PATIENT-LVL III: ICD-10-PCS | Mod: PBBFAC,HCNC,, | Performed by: FAMILY MEDICINE

## 2021-12-10 PROCEDURE — 99214 PR OFFICE/OUTPT VISIT, EST, LEVL IV, 30-39 MIN: ICD-10-PCS | Mod: HCNC,S$GLB,, | Performed by: FAMILY MEDICINE

## 2021-12-10 PROCEDURE — 99999 PR PBB SHADOW E&M-EST. PATIENT-LVL III: CPT | Mod: PBBFAC,HCNC,, | Performed by: FAMILY MEDICINE

## 2021-12-10 PROCEDURE — 99214 OFFICE O/P EST MOD 30 MIN: CPT | Mod: HCNC,S$GLB,, | Performed by: FAMILY MEDICINE

## 2021-12-10 RX ORDER — MELOXICAM 15 MG/1
15 TABLET ORAL DAILY
Qty: 90 TABLET | Refills: 3 | Status: SHIPPED | OUTPATIENT
Start: 2021-12-10 | End: 2023-04-18 | Stop reason: SDUPTHER

## 2021-12-12 PROBLEM — E55.9 VITAMIN D INSUFFICIENCY: Status: ACTIVE | Noted: 2021-12-12

## 2021-12-12 PROBLEM — Z86.39 PERSONAL HISTORY OF OTHER ENDOCRINE, NUTRITIONAL AND METABOLIC DISEASE: Status: ACTIVE | Noted: 2021-12-12

## 2021-12-13 ENCOUNTER — LAB VISIT (OUTPATIENT)
Dept: LAB | Facility: HOSPITAL | Age: 66
End: 2021-12-13
Attending: FAMILY MEDICINE
Payer: MEDICARE

## 2021-12-13 DIAGNOSIS — R97.20 ELEVATED PSA: Primary | ICD-10-CM

## 2021-12-13 DIAGNOSIS — E55.9 VITAMIN D INSUFFICIENCY: ICD-10-CM

## 2021-12-13 DIAGNOSIS — M17.11 PRIMARY OSTEOARTHRITIS OF RIGHT KNEE: ICD-10-CM

## 2021-12-13 DIAGNOSIS — Z12.5 SPECIAL SCREENING FOR MALIGNANT NEOPLASM OF PROSTATE: ICD-10-CM

## 2021-12-13 DIAGNOSIS — Z86.39 PERSONAL HISTORY OF OTHER ENDOCRINE, NUTRITIONAL AND METABOLIC DISEASE: ICD-10-CM

## 2021-12-13 DIAGNOSIS — Z13.6 SCREENING FOR CARDIOVASCULAR CONDITION: ICD-10-CM

## 2021-12-13 LAB
25(OH)D3+25(OH)D2 SERPL-MCNC: 31 NG/ML (ref 30–96)
ALBUMIN SERPL BCP-MCNC: 4 G/DL (ref 3.5–5.2)
ALP SERPL-CCNC: 73 U/L (ref 55–135)
ALT SERPL W/O P-5'-P-CCNC: 26 U/L (ref 10–44)
ANION GAP SERPL CALC-SCNC: 9 MMOL/L (ref 8–16)
AST SERPL-CCNC: 28 U/L (ref 10–40)
BILIRUB SERPL-MCNC: 0.5 MG/DL (ref 0.1–1)
BUN SERPL-MCNC: 10 MG/DL (ref 8–23)
CALCIUM SERPL-MCNC: 9.3 MG/DL (ref 8.7–10.5)
CHLORIDE SERPL-SCNC: 107 MMOL/L (ref 95–110)
CHOLEST SERPL-MCNC: 160 MG/DL (ref 120–199)
CHOLEST/HDLC SERPL: 3 {RATIO} (ref 2–5)
CO2 SERPL-SCNC: 24 MMOL/L (ref 23–29)
COMPLEXED PSA SERPL-MCNC: 5.9 NG/ML (ref 0–4)
CREAT SERPL-MCNC: 0.8 MG/DL (ref 0.5–1.4)
EST. GFR  (AFRICAN AMERICAN): >60 ML/MIN/1.73 M^2
EST. GFR  (NON AFRICAN AMERICAN): >60 ML/MIN/1.73 M^2
ESTIMATED AVG GLUCOSE: 120 MG/DL (ref 68–131)
GLUCOSE SERPL-MCNC: 100 MG/DL (ref 70–110)
HBA1C MFR BLD: 5.8 % (ref 4–5.6)
HDLC SERPL-MCNC: 53 MG/DL (ref 40–75)
HDLC SERPL: 33.1 % (ref 20–50)
LDLC SERPL CALC-MCNC: 97.6 MG/DL (ref 63–159)
NONHDLC SERPL-MCNC: 107 MG/DL
POTASSIUM SERPL-SCNC: 4.1 MMOL/L (ref 3.5–5.1)
PROT SERPL-MCNC: 6.6 G/DL (ref 6–8.4)
SODIUM SERPL-SCNC: 140 MMOL/L (ref 136–145)
T4 FREE SERPL-MCNC: 0.92 NG/DL (ref 0.71–1.51)
TRIGL SERPL-MCNC: 47 MG/DL (ref 30–150)
TSH SERPL DL<=0.005 MIU/L-ACNC: 0.67 UIU/ML (ref 0.4–4)

## 2021-12-13 PROCEDURE — 36415 COLL VENOUS BLD VENIPUNCTURE: CPT | Mod: HCNC,PO | Performed by: FAMILY MEDICINE

## 2021-12-13 PROCEDURE — 80061 LIPID PANEL: CPT | Mod: HCNC | Performed by: FAMILY MEDICINE

## 2021-12-13 PROCEDURE — 83036 HEMOGLOBIN GLYCOSYLATED A1C: CPT | Mod: HCNC | Performed by: FAMILY MEDICINE

## 2021-12-13 PROCEDURE — 80053 COMPREHEN METABOLIC PANEL: CPT | Mod: HCNC | Performed by: FAMILY MEDICINE

## 2021-12-13 PROCEDURE — 82306 VITAMIN D 25 HYDROXY: CPT | Mod: HCNC | Performed by: FAMILY MEDICINE

## 2021-12-13 PROCEDURE — 84443 ASSAY THYROID STIM HORMONE: CPT | Mod: HCNC | Performed by: FAMILY MEDICINE

## 2021-12-13 PROCEDURE — 84439 ASSAY OF FREE THYROXINE: CPT | Mod: HCNC | Performed by: FAMILY MEDICINE

## 2021-12-13 PROCEDURE — 84153 ASSAY OF PSA TOTAL: CPT | Mod: HCNC | Performed by: FAMILY MEDICINE

## 2021-12-14 ENCOUNTER — TELEPHONE (OUTPATIENT)
Dept: FAMILY MEDICINE | Facility: CLINIC | Age: 66
End: 2021-12-14
Payer: MEDICARE

## 2021-12-30 ENCOUNTER — OFFICE VISIT (OUTPATIENT)
Dept: UROLOGY | Facility: CLINIC | Age: 66
End: 2021-12-30
Payer: MEDICARE

## 2021-12-30 VITALS
WEIGHT: 178.13 LBS | DIASTOLIC BLOOD PRESSURE: 82 MMHG | SYSTOLIC BLOOD PRESSURE: 136 MMHG | HEIGHT: 71 IN | BODY MASS INDEX: 24.94 KG/M2

## 2021-12-30 DIAGNOSIS — R35.0 URINARY FREQUENCY: Primary | ICD-10-CM

## 2021-12-30 DIAGNOSIS — R97.20 ELEVATED PSA: ICD-10-CM

## 2021-12-30 PROCEDURE — 3044F PR MOST RECENT HEMOGLOBIN A1C LEVEL <7.0%: ICD-10-PCS | Mod: HCNC,CPTII,S$GLB, | Performed by: NURSE PRACTITIONER

## 2021-12-30 PROCEDURE — 3075F SYST BP GE 130 - 139MM HG: CPT | Mod: HCNC,CPTII,S$GLB, | Performed by: NURSE PRACTITIONER

## 2021-12-30 PROCEDURE — 1160F PR REVIEW ALL MEDS BY PRESCRIBER/CLIN PHARMACIST DOCUMENTED: ICD-10-PCS | Mod: HCNC,CPTII,S$GLB, | Performed by: NURSE PRACTITIONER

## 2021-12-30 PROCEDURE — 99203 PR OFFICE/OUTPT VISIT, NEW, LEVL III, 30-44 MIN: ICD-10-PCS | Mod: HCNC,S$GLB,, | Performed by: NURSE PRACTITIONER

## 2021-12-30 PROCEDURE — 3288F FALL RISK ASSESSMENT DOCD: CPT | Mod: HCNC,CPTII,S$GLB, | Performed by: NURSE PRACTITIONER

## 2021-12-30 PROCEDURE — 3079F PR MOST RECENT DIASTOLIC BLOOD PRESSURE 80-89 MM HG: ICD-10-PCS | Mod: HCNC,CPTII,S$GLB, | Performed by: NURSE PRACTITIONER

## 2021-12-30 PROCEDURE — 3075F PR MOST RECENT SYSTOLIC BLOOD PRESS GE 130-139MM HG: ICD-10-PCS | Mod: HCNC,CPTII,S$GLB, | Performed by: NURSE PRACTITIONER

## 2021-12-30 PROCEDURE — 87086 URINE CULTURE/COLONY COUNT: CPT | Mod: HCNC | Performed by: NURSE PRACTITIONER

## 2021-12-30 PROCEDURE — 3288F PR FALLS RISK ASSESSMENT DOCUMENTED: ICD-10-PCS | Mod: HCNC,CPTII,S$GLB, | Performed by: NURSE PRACTITIONER

## 2021-12-30 PROCEDURE — 1101F PT FALLS ASSESS-DOCD LE1/YR: CPT | Mod: HCNC,CPTII,S$GLB, | Performed by: NURSE PRACTITIONER

## 2021-12-30 PROCEDURE — 1159F PR MEDICATION LIST DOCUMENTED IN MEDICAL RECORD: ICD-10-PCS | Mod: HCNC,CPTII,S$GLB, | Performed by: NURSE PRACTITIONER

## 2021-12-30 PROCEDURE — 1101F PR PT FALLS ASSESS DOC 0-1 FALLS W/OUT INJ PAST YR: ICD-10-PCS | Mod: HCNC,CPTII,S$GLB, | Performed by: NURSE PRACTITIONER

## 2021-12-30 PROCEDURE — 3008F PR BODY MASS INDEX (BMI) DOCUMENTED: ICD-10-PCS | Mod: HCNC,CPTII,S$GLB, | Performed by: NURSE PRACTITIONER

## 2021-12-30 PROCEDURE — 1159F MED LIST DOCD IN RCRD: CPT | Mod: HCNC,CPTII,S$GLB, | Performed by: NURSE PRACTITIONER

## 2021-12-30 PROCEDURE — 3008F BODY MASS INDEX DOCD: CPT | Mod: HCNC,CPTII,S$GLB, | Performed by: NURSE PRACTITIONER

## 2021-12-30 PROCEDURE — 3044F HG A1C LEVEL LT 7.0%: CPT | Mod: HCNC,CPTII,S$GLB, | Performed by: NURSE PRACTITIONER

## 2021-12-30 PROCEDURE — 3079F DIAST BP 80-89 MM HG: CPT | Mod: HCNC,CPTII,S$GLB, | Performed by: NURSE PRACTITIONER

## 2021-12-30 PROCEDURE — 99203 OFFICE O/P NEW LOW 30 MIN: CPT | Mod: HCNC,S$GLB,, | Performed by: NURSE PRACTITIONER

## 2021-12-30 PROCEDURE — 1160F RVW MEDS BY RX/DR IN RCRD: CPT | Mod: HCNC,CPTII,S$GLB, | Performed by: NURSE PRACTITIONER

## 2021-12-30 RX ORDER — TAMSULOSIN HYDROCHLORIDE 0.4 MG/1
0.4 CAPSULE ORAL DAILY
Qty: 30 CAPSULE | Refills: 11 | Status: SHIPPED | OUTPATIENT
Start: 2021-12-30 | End: 2023-01-03

## 2021-12-31 LAB — BACTERIA UR CULT: NO GROWTH

## 2022-01-03 ENCOUNTER — TELEPHONE (OUTPATIENT)
Dept: UROLOGY | Facility: CLINIC | Age: 67
End: 2022-01-03
Payer: MEDICARE

## 2022-01-03 DIAGNOSIS — R97.20 ELEVATED PSA: Primary | ICD-10-CM

## 2022-01-03 RX ORDER — CIPROFLOXACIN 500 MG/1
500 TABLET ORAL 2 TIMES DAILY
Qty: 4 TABLET | Refills: 0 | Status: SHIPPED | OUTPATIENT
Start: 2022-01-03 | End: 2022-01-05

## 2022-01-20 ENCOUNTER — OFFICE VISIT (OUTPATIENT)
Dept: OPTOMETRY | Facility: CLINIC | Age: 67
End: 2022-01-20
Payer: MEDICARE

## 2022-01-20 DIAGNOSIS — H52.223 MYOPIA OF BOTH EYES WITH REGULAR ASTIGMATISM AND PRESBYOPIA: ICD-10-CM

## 2022-01-20 DIAGNOSIS — H52.13 MYOPIA OF BOTH EYES WITH REGULAR ASTIGMATISM AND PRESBYOPIA: ICD-10-CM

## 2022-01-20 DIAGNOSIS — H40.1132 PRIMARY OPEN ANGLE GLAUCOMA (POAG) OF BOTH EYES, MODERATE STAGE: ICD-10-CM

## 2022-01-20 DIAGNOSIS — H52.4 MYOPIA OF BOTH EYES WITH REGULAR ASTIGMATISM AND PRESBYOPIA: ICD-10-CM

## 2022-01-20 DIAGNOSIS — H25.13 NUCLEAR SCLEROSIS, BILATERAL: Primary | ICD-10-CM

## 2022-01-20 PROCEDURE — 1159F PR MEDICATION LIST DOCUMENTED IN MEDICAL RECORD: ICD-10-PCS | Mod: HCNC,CPTII,S$GLB, | Performed by: OPTOMETRIST

## 2022-01-20 PROCEDURE — 1159F MED LIST DOCD IN RCRD: CPT | Mod: HCNC,CPTII,S$GLB, | Performed by: OPTOMETRIST

## 2022-01-20 PROCEDURE — 92133 CPTRZD OPH DX IMG PST SGM ON: CPT | Mod: HCNC,S$GLB,, | Performed by: OPTOMETRIST

## 2022-01-20 PROCEDURE — 1126F PR PAIN SEVERITY QUANTIFIED, NO PAIN PRESENT: ICD-10-PCS | Mod: HCNC,CPTII,S$GLB, | Performed by: OPTOMETRIST

## 2022-01-20 PROCEDURE — 1101F PT FALLS ASSESS-DOCD LE1/YR: CPT | Mod: HCNC,CPTII,S$GLB, | Performed by: OPTOMETRIST

## 2022-01-20 PROCEDURE — 92133 OCT, OPTIC NERVE - OU - BOTH EYES: ICD-10-PCS | Mod: HCNC,S$GLB,, | Performed by: OPTOMETRIST

## 2022-01-20 PROCEDURE — 99999 PR PBB SHADOW E&M-EST. PATIENT-LVL II: ICD-10-PCS | Mod: PBBFAC,HCNC,, | Performed by: OPTOMETRIST

## 2022-01-20 PROCEDURE — 92015 PR REFRACTION: ICD-10-PCS | Mod: HCNC,S$GLB,, | Performed by: OPTOMETRIST

## 2022-01-20 PROCEDURE — 92015 DETERMINE REFRACTIVE STATE: CPT | Mod: HCNC,S$GLB,, | Performed by: OPTOMETRIST

## 2022-01-20 PROCEDURE — 99999 PR PBB SHADOW E&M-EST. PATIENT-LVL II: CPT | Mod: PBBFAC,HCNC,, | Performed by: OPTOMETRIST

## 2022-01-20 PROCEDURE — 3288F FALL RISK ASSESSMENT DOCD: CPT | Mod: HCNC,CPTII,S$GLB, | Performed by: OPTOMETRIST

## 2022-01-20 PROCEDURE — 3288F PR FALLS RISK ASSESSMENT DOCUMENTED: ICD-10-PCS | Mod: HCNC,CPTII,S$GLB, | Performed by: OPTOMETRIST

## 2022-01-20 PROCEDURE — 1101F PR PT FALLS ASSESS DOC 0-1 FALLS W/OUT INJ PAST YR: ICD-10-PCS | Mod: HCNC,CPTII,S$GLB, | Performed by: OPTOMETRIST

## 2022-01-20 PROCEDURE — 1126F AMNT PAIN NOTED NONE PRSNT: CPT | Mod: HCNC,CPTII,S$GLB, | Performed by: OPTOMETRIST

## 2022-01-20 PROCEDURE — 92004 PR EYE EXAM, NEW PATIENT,COMPREHESV: ICD-10-PCS | Mod: HCNC,S$GLB,, | Performed by: OPTOMETRIST

## 2022-01-20 PROCEDURE — 92004 COMPRE OPH EXAM NEW PT 1/>: CPT | Mod: HCNC,S$GLB,, | Performed by: OPTOMETRIST

## 2022-01-20 RX ORDER — LATANOPROST 50 UG/ML
1 SOLUTION/ DROPS OPHTHALMIC NIGHTLY
Qty: 2.5 ML | Refills: 6 | Status: SHIPPED | OUTPATIENT
Start: 2022-01-20 | End: 2023-01-09

## 2022-01-20 NOTE — PROGRESS NOTES
HPI     Annual eye exam, pt not diabetic    Hemoglobin A1C       Date                     Value               Ref Range             Status                12/13/2021               5.8 (H)             4.0 - 5.6 %           Final                  10/05/2020               5.9 (H)             4.0 - 5.6 %           Final                   03/19/2019               5.9 (H)             4.0 - 5.6 %           Final                Last edited by Fortunato Curry, OD on 1/20/2022  3:34 PM. (History)            Assessment /Plan     For exam results, see Encounter Report.    Nuclear sclerosis, bilateral  -Educated patient on presence of cataracts at today's exam, monitor at annual dilated fundus exam. 6+ years surgical estimate.    Primary open angle glaucoma (POAG) of both eyes, moderate stage  -     OCT, Optic Nerve - OU - Both Eyes  -     [place] Visual Field - OU - Extended - Both Eyes  -     latanoprost 0.005 % ophthalmic solution; Place 1 drop into both eyes every evening.  Dispense: 2.5 mL; Refill: 6  -Enlarged CDs, nasal thinning on OCT, elevated IOP  -Start Latanoprost QHS OU, Target upper teens  -HVF w/ IOP check in 2 wks    Myopia of both eyes with regular astigmatism and presbyopia  Eyeglass Final Rx     Eyeglass Final Rx       Sphere Cylinder Axis Dist VA Add    Right -2.00 +2.75 125 20/20 +2.50    Left -1.75 +3.00 070 20/25 +2.50    Type: Bifocal    Expiration Date: 1/21/2023                  RTC 2 wks IOP, HVF

## 2022-02-14 ENCOUNTER — PROCEDURE VISIT (OUTPATIENT)
Dept: UROLOGY | Facility: CLINIC | Age: 67
End: 2022-02-14
Attending: UROLOGY
Payer: MEDICARE

## 2022-02-14 VITALS
RESPIRATION RATE: 18 BRPM | TEMPERATURE: 97 F | WEIGHT: 185.19 LBS | HEART RATE: 71 BPM | SYSTOLIC BLOOD PRESSURE: 133 MMHG | DIASTOLIC BLOOD PRESSURE: 87 MMHG | BODY MASS INDEX: 25.83 KG/M2

## 2022-02-14 DIAGNOSIS — R97.20 ELEVATED PSA: ICD-10-CM

## 2022-02-14 PROCEDURE — 96372 THER/PROPH/DIAG INJ SC/IM: CPT | Mod: HCNC,59,S$GLB, | Performed by: UROLOGY

## 2022-02-14 PROCEDURE — 55700 TRANSRECTAL ULTRASOUND W/ BIOPSY: CPT | Mod: HCNC,S$GLB,, | Performed by: UROLOGY

## 2022-02-14 PROCEDURE — 88344 PR IHC OR ICC EACH MULTIPLEX ANTIBODY STAIN PROCEDURE: ICD-10-PCS | Mod: 26,HCNC,, | Performed by: PATHOLOGY

## 2022-02-14 PROCEDURE — 88305 TISSUE EXAM BY PATHOLOGIST: ICD-10-PCS | Mod: 26,HCNC,, | Performed by: PATHOLOGY

## 2022-02-14 PROCEDURE — 55700 TRANSRECTAL ULTRASOUND W/ BIOPSY: ICD-10-PCS | Mod: HCNC,S$GLB,, | Performed by: UROLOGY

## 2022-02-14 PROCEDURE — 76872 TRANSRECTAL ULTRASOUND W/ BIOPSY: ICD-10-PCS | Mod: HCNC,S$GLB,, | Performed by: UROLOGY

## 2022-02-14 PROCEDURE — 88305 TISSUE EXAM BY PATHOLOGIST: CPT | Mod: 59,HCNC | Performed by: PATHOLOGY

## 2022-02-14 PROCEDURE — 88305 TISSUE EXAM BY PATHOLOGIST: CPT | Mod: 26,HCNC,, | Performed by: PATHOLOGY

## 2022-02-14 PROCEDURE — 76872 US TRANSRECTAL: CPT | Mod: HCNC,S$GLB,, | Performed by: UROLOGY

## 2022-02-14 PROCEDURE — 88344 IMHCHEM/IMCYTCHM EA MLT ANTB: CPT | Mod: 26,HCNC,, | Performed by: PATHOLOGY

## 2022-02-14 PROCEDURE — 96372 PR INJECTION,THERAP/PROPH/DIAG2ST, IM OR SUBCUT: ICD-10-PCS | Mod: HCNC,59,S$GLB, | Performed by: UROLOGY

## 2022-02-14 PROCEDURE — 88344 IMHCHEM/IMCYTCHM EA MLT ANTB: CPT | Mod: HCNC | Performed by: PATHOLOGY

## 2022-02-14 RX ORDER — GENTAMICIN SULFATE 40 MG/ML
80 INJECTION, SOLUTION INTRAMUSCULAR; INTRAVENOUS
Status: COMPLETED | OUTPATIENT
Start: 2022-02-14 | End: 2022-02-14

## 2022-02-14 RX ORDER — LIDOCAINE HYDROCHLORIDE AND EPINEPHRINE 10; 10 MG/ML; UG/ML
1 INJECTION, SOLUTION INFILTRATION; PERINEURAL
Status: DISCONTINUED | OUTPATIENT
Start: 2022-02-14 | End: 2022-02-14

## 2022-02-14 RX ORDER — LIDOCAINE HYDROCHLORIDE 10 MG/ML
1 INJECTION INFILTRATION; PERINEURAL
Status: COMPLETED | OUTPATIENT
Start: 2022-02-14 | End: 2022-02-14

## 2022-02-14 RX ORDER — LIDOCAINE HYDROCHLORIDE 20 MG/ML
JELLY TOPICAL
Status: COMPLETED | OUTPATIENT
Start: 2022-02-14 | End: 2022-02-14

## 2022-02-14 RX ADMIN — LIDOCAINE HYDROCHLORIDE 1 ML: 10 INJECTION INFILTRATION; PERINEURAL at 01:02

## 2022-02-14 RX ADMIN — GENTAMICIN SULFATE 80 MG: 40 INJECTION, SOLUTION INTRAMUSCULAR; INTRAVENOUS at 12:02

## 2022-02-14 RX ADMIN — LIDOCAINE HYDROCHLORIDE 1 ML: 20 JELLY TOPICAL at 01:02

## 2022-02-14 NOTE — PROCEDURES
Transrectal Ultrasound w/ Biopsy    Date/Time: 2/14/2022 12:30 PM  Performed by: Sha Angel MD  Authorized by: Angela Finley NP     Consent Done?:  Yes (Written)  Indications: Elevated PSA    Preparation: Patient was prepped and draped in usual sterile fashion    Position:  Left lateral  Anesthesia:  10cc's 1% Lidocaine  Patient sedated: No    Prostate Size:  31.9 cm^3 (PSAD .22)  Left Base Biopsies: 2  Left Mid Biopsies: 2  Left [place] Biopsies: 2  Right Base Biopsies: 2  Right Mid Biopsies: 2  Right [place] Biopsies: 2  Transitional zone: No    Total Biopsies:  12    Patient tolerance:  Patient tolerated the procedure well with no immediate complications

## 2022-02-17 ENCOUNTER — OFFICE VISIT (OUTPATIENT)
Dept: ORTHOPEDICS | Facility: CLINIC | Age: 67
End: 2022-02-17
Payer: MEDICARE

## 2022-02-17 VITALS
WEIGHT: 188.81 LBS | HEIGHT: 71 IN | OXYGEN SATURATION: 99 % | BODY MASS INDEX: 26.43 KG/M2 | HEART RATE: 71 BPM | RESPIRATION RATE: 18 BRPM | DIASTOLIC BLOOD PRESSURE: 70 MMHG | SYSTOLIC BLOOD PRESSURE: 129 MMHG

## 2022-02-17 DIAGNOSIS — M17.11 PRIMARY OSTEOARTHRITIS OF RIGHT KNEE: Primary | ICD-10-CM

## 2022-02-17 PROCEDURE — 3008F BODY MASS INDEX DOCD: CPT | Mod: HCNC,CPTII,S$GLB, | Performed by: ORTHOPAEDIC SURGERY

## 2022-02-17 PROCEDURE — 1159F MED LIST DOCD IN RCRD: CPT | Mod: HCNC,CPTII,S$GLB, | Performed by: ORTHOPAEDIC SURGERY

## 2022-02-17 PROCEDURE — 3078F PR MOST RECENT DIASTOLIC BLOOD PRESSURE < 80 MM HG: ICD-10-PCS | Mod: HCNC,CPTII,S$GLB, | Performed by: ORTHOPAEDIC SURGERY

## 2022-02-17 PROCEDURE — 99213 PR OFFICE/OUTPT VISIT, EST, LEVL III, 20-29 MIN: ICD-10-PCS | Mod: HCNC,S$GLB,, | Performed by: ORTHOPAEDIC SURGERY

## 2022-02-17 PROCEDURE — 3078F DIAST BP <80 MM HG: CPT | Mod: HCNC,CPTII,S$GLB, | Performed by: ORTHOPAEDIC SURGERY

## 2022-02-17 PROCEDURE — 99999 PR PBB SHADOW E&M-EST. PATIENT-LVL III: CPT | Mod: PBBFAC,HCNC,, | Performed by: ORTHOPAEDIC SURGERY

## 2022-02-17 PROCEDURE — 1125F AMNT PAIN NOTED PAIN PRSNT: CPT | Mod: HCNC,CPTII,S$GLB, | Performed by: ORTHOPAEDIC SURGERY

## 2022-02-17 PROCEDURE — 3074F PR MOST RECENT SYSTOLIC BLOOD PRESSURE < 130 MM HG: ICD-10-PCS | Mod: HCNC,CPTII,S$GLB, | Performed by: ORTHOPAEDIC SURGERY

## 2022-02-17 PROCEDURE — 1159F PR MEDICATION LIST DOCUMENTED IN MEDICAL RECORD: ICD-10-PCS | Mod: HCNC,CPTII,S$GLB, | Performed by: ORTHOPAEDIC SURGERY

## 2022-02-17 PROCEDURE — 99999 PR PBB SHADOW E&M-EST. PATIENT-LVL III: ICD-10-PCS | Mod: PBBFAC,HCNC,, | Performed by: ORTHOPAEDIC SURGERY

## 2022-02-17 PROCEDURE — 3008F PR BODY MASS INDEX (BMI) DOCUMENTED: ICD-10-PCS | Mod: HCNC,CPTII,S$GLB, | Performed by: ORTHOPAEDIC SURGERY

## 2022-02-17 PROCEDURE — 1125F PR PAIN SEVERITY QUANTIFIED, PAIN PRESENT: ICD-10-PCS | Mod: HCNC,CPTII,S$GLB, | Performed by: ORTHOPAEDIC SURGERY

## 2022-02-17 PROCEDURE — 99213 OFFICE O/P EST LOW 20 MIN: CPT | Mod: HCNC,S$GLB,, | Performed by: ORTHOPAEDIC SURGERY

## 2022-02-17 PROCEDURE — 3074F SYST BP LT 130 MM HG: CPT | Mod: HCNC,CPTII,S$GLB, | Performed by: ORTHOPAEDIC SURGERY

## 2022-02-17 NOTE — PROGRESS NOTES
Follow up PATIENT ORTHOPAEDIC: Knee    PRIMARY CARE PHYSICIAN: Azikiwe K Lombard, MD   REFERRING PROVIDER: No referring provider defined for this encounter.     ASSESSMENT & PLAN:    Impression:  Right Knee Severe Degenerative Osteoarthritis, Primary     Follow Up Plan: PRN    Non operative care:    Herbie Naqvi has physical exam evidence of above and wishes to pursue an non-operative care. I am recommending the following: activity modification, NSAIDs PRN. Patient had ongoing relief after previous injection. Does not feel additional intervention is necessary at this time. I discussed natural history of OA and likely recurrence of pain. If that occurs, asked him to call and we will get him in for evaluation and possibly another injection.     The patient has been ordered:  None    CONSULTS:   None    ACTIVE PROBLEM LIST  Patient Active Problem List   Diagnosis    Complex tear of medial meniscus of right knee as current injury    Primary osteoarthritis of right knee    Vitamin D insufficiency    Personal history of other endocrine, nutritional and metabolic disease         SUBJECTIVE    CHIEF COMPLAINT: Knee Pain    HPI:   Herbie Naqvi is a 66 y.o. male here for evaluation and management of right knee pain. There is not a specific incident that brought about this pain. he has had progressive problems with the knee(s) starting 2 years ago but progressing to multiple times a day over the past 3 months which is interfering with activities which include: enjoying hobbies, exercise and standing for prolonged periods of time    Currently the pain in the joint is rated at 6 out of 10 with moderate activity.  The pain is intermittent and is located in the Right knee, at level of joint line, located medially, located laterally and located anterior. The pain is described as aching, stiffness and throbbing. Relieving factors include rest and repositioning.     There is not associated Catching, Clicking and Popping.  "    Herbie Naqvi has no additional complaints.     Interval History 2/17/22: Patient received injection at last visit. Today, he reports continued pain relief after injection.    PROGRESSIVE SYMPTOMS:  Pain impacting work    FUNCTIONAL STATUS:   Climb a flight of stairs or walk up a hill     PREVIOUS TREATMENTS:  Medical: Steroid Injections, he has tried two injections many years ago. One lasted 1 year, other 6 months  Physical Therapy: Activities Modified   Previous Orthopaedic Surgery: None    REVIEW OF SYSTEMS:  PAIN ASSESSMENT:  See HPI.  MUSCULOSKELETAL: See HPI.  OTHER 10 point review of systems is negative except as stated in HPI above    PAST MEDICAL HISTORY   has no past medical history on file.     PAST SURGICAL HISTORY   has a past surgical history that includes wrist broke  and Colonoscopy (N/A, 11/30/2017).     FAMILY HISTORY  family history includes Diabetes in his mother; Heart disease in his father; Hyperlipidemia in his father.     SOCIAL HISTORY   reports that he has quit smoking. He started smoking about 7 years ago. He has never used smokeless tobacco. He reports that he does not drink alcohol and does not use drugs.     ALLERGIES   Review of patient's allergies indicates:  No Known Allergies     MEDICATIONS  Current Outpatient Medications on File Prior to Visit   Medication Sig Dispense Refill    latanoprost 0.005 % ophthalmic solution Place 1 drop into both eyes every evening. 2.5 mL 6    meloxicam (MOBIC) 15 MG tablet Take 1 tablet (15 mg total) by mouth once daily. 90 tablet 3    tamsulosin (FLOMAX) 0.4 mg Cap Take 1 capsule (0.4 mg total) by mouth once daily. 30 capsule 11     No current facility-administered medications on file prior to visit.          PHYSICAL EXAM   height is 5' 11" (1.803 m) and weight is 85.6 kg (188 lb 12.8 oz). His blood pressure is 129/70 and his pulse is 71. His respiration is 18 and oxygen saturation is 99%.   Body mass index is 26.33 kg/m².      All other " systems deferred.  GENERAL:  No acute distress  HABITUS: Normal  GAIT: Non-antalgic  SKIN: Normal     KNEE EXAM:    right:   Effusion: No joint effusion  TTP: yes over Medial Joint Line   no crepitus with passive knee ROM  Passive ROM: Extension 0, Flexion 130  No pain with manipulation of patella  Stable to varus/valgus stress. No increased laxity to anterior/posterior drawer testing  negative Sara's test  No pain with IR/ER rotation of the hip  5/5 strength in knee flexion and extension, ankle plantarflexion and dorsiflexion  Neurovascular Status: Sensation intact to light touch in Sural, Saphenous, SPN, DPN, Tibial nerve distribution  2+ pulse DP/PT, normal capillary refill, foot has normal warmth    DATA:  Diagnostic tests reviewed for today's visit:     4v of the knee reveal Severe degenerative changes of the Medial and Patellofemoral compartment. There is evidence of advanced osteoarthritis changes with Subchondral sclerosis, Osteophyte formation and Joint space narrowing. The limb is in varus alignment. The patella is tracking midline and is in normal alignment.

## 2022-02-22 LAB
FINAL PATHOLOGIC DIAGNOSIS: NORMAL
Lab: NORMAL

## 2022-03-02 ENCOUNTER — PATIENT OUTREACH (OUTPATIENT)
Dept: ADMINISTRATIVE | Facility: OTHER | Age: 67
End: 2022-03-02
Payer: MEDICARE

## 2022-03-03 ENCOUNTER — OFFICE VISIT (OUTPATIENT)
Dept: UROLOGY | Facility: CLINIC | Age: 67
End: 2022-03-03
Attending: UROLOGY
Payer: MEDICARE

## 2022-03-03 VITALS
DIASTOLIC BLOOD PRESSURE: 74 MMHG | SYSTOLIC BLOOD PRESSURE: 123 MMHG | WEIGHT: 188 LBS | HEART RATE: 73 BPM | HEIGHT: 71 IN | BODY MASS INDEX: 26.32 KG/M2

## 2022-03-03 DIAGNOSIS — C61 PROSTATE CANCER: Primary | ICD-10-CM

## 2022-03-03 PROCEDURE — 99499 UNLISTED E&M SERVICE: CPT | Mod: HCNC,S$GLB,, | Performed by: UROLOGY

## 2022-03-03 PROCEDURE — 1101F PR PT FALLS ASSESS DOC 0-1 FALLS W/OUT INJ PAST YR: ICD-10-PCS | Mod: HCNC,CPTII,S$GLB, | Performed by: UROLOGY

## 2022-03-03 PROCEDURE — 3078F DIAST BP <80 MM HG: CPT | Mod: HCNC,CPTII,S$GLB, | Performed by: UROLOGY

## 2022-03-03 PROCEDURE — 3288F FALL RISK ASSESSMENT DOCD: CPT | Mod: HCNC,CPTII,S$GLB, | Performed by: UROLOGY

## 2022-03-03 PROCEDURE — 1160F PR REVIEW ALL MEDS BY PRESCRIBER/CLIN PHARMACIST DOCUMENTED: ICD-10-PCS | Mod: HCNC,CPTII,S$GLB, | Performed by: UROLOGY

## 2022-03-03 PROCEDURE — 1159F PR MEDICATION LIST DOCUMENTED IN MEDICAL RECORD: ICD-10-PCS | Mod: HCNC,CPTII,S$GLB, | Performed by: UROLOGY

## 2022-03-03 PROCEDURE — 3074F PR MOST RECENT SYSTOLIC BLOOD PRESSURE < 130 MM HG: ICD-10-PCS | Mod: HCNC,CPTII,S$GLB, | Performed by: UROLOGY

## 2022-03-03 PROCEDURE — 99499 RISK ADDL DX/OHS AUDIT: ICD-10-PCS | Mod: HCNC,S$GLB,, | Performed by: UROLOGY

## 2022-03-03 PROCEDURE — 3074F SYST BP LT 130 MM HG: CPT | Mod: HCNC,CPTII,S$GLB, | Performed by: UROLOGY

## 2022-03-03 PROCEDURE — 3008F BODY MASS INDEX DOCD: CPT | Mod: HCNC,CPTII,S$GLB, | Performed by: UROLOGY

## 2022-03-03 PROCEDURE — 99214 PR OFFICE/OUTPT VISIT, EST, LEVL IV, 30-39 MIN: ICD-10-PCS | Mod: HCNC,S$GLB,, | Performed by: UROLOGY

## 2022-03-03 PROCEDURE — 1101F PT FALLS ASSESS-DOCD LE1/YR: CPT | Mod: HCNC,CPTII,S$GLB, | Performed by: UROLOGY

## 2022-03-03 PROCEDURE — 3008F PR BODY MASS INDEX (BMI) DOCUMENTED: ICD-10-PCS | Mod: HCNC,CPTII,S$GLB, | Performed by: UROLOGY

## 2022-03-03 PROCEDURE — 99214 OFFICE O/P EST MOD 30 MIN: CPT | Mod: HCNC,S$GLB,, | Performed by: UROLOGY

## 2022-03-03 PROCEDURE — 3078F PR MOST RECENT DIASTOLIC BLOOD PRESSURE < 80 MM HG: ICD-10-PCS | Mod: HCNC,CPTII,S$GLB, | Performed by: UROLOGY

## 2022-03-03 PROCEDURE — 3288F PR FALLS RISK ASSESSMENT DOCUMENTED: ICD-10-PCS | Mod: HCNC,CPTII,S$GLB, | Performed by: UROLOGY

## 2022-03-03 PROCEDURE — 1126F AMNT PAIN NOTED NONE PRSNT: CPT | Mod: HCNC,CPTII,S$GLB, | Performed by: UROLOGY

## 2022-03-03 PROCEDURE — 1126F PR PAIN SEVERITY QUANTIFIED, NO PAIN PRESENT: ICD-10-PCS | Mod: HCNC,CPTII,S$GLB, | Performed by: UROLOGY

## 2022-03-03 PROCEDURE — 1159F MED LIST DOCD IN RCRD: CPT | Mod: HCNC,CPTII,S$GLB, | Performed by: UROLOGY

## 2022-03-03 PROCEDURE — 1160F RVW MEDS BY RX/DR IN RCRD: CPT | Mod: HCNC,CPTII,S$GLB, | Performed by: UROLOGY

## 2022-03-03 RX ORDER — TADALAFIL 20 MG/1
20 TABLET ORAL
Qty: 30 TABLET | Refills: 11 | Status: SHIPPED | OUTPATIENT
Start: 2022-03-03 | End: 2023-03-27 | Stop reason: SDUPTHER

## 2022-03-03 NOTE — PROGRESS NOTES
"Subjective:      Herbie Naqvi is a 66 y.o. y.o. male who returns today regarding his elevated PSA.  He is s/p TRUS/biopsy on 2/14/22 and is here to review results.  He has no c/o following procedure and no c/o today.    The following portions of the patient's history were reviewed and updated as appropriate: allergies, current medications, past family history, past medical history, past social history, past surgical history and problem list.       Objective:   Vitals: /74   Pulse 73   Ht 5' 11" (1.803 m)   Wt 85.3 kg (188 lb)   BMI 26.22 kg/m²      Physical Exam   General: alert and oriented, no acute distress  Respiratory: Symmetric expansion, non-labored breathing  Neuro: no gross deficits  Psych: normal judgment and insight, normal mood/affect and non-anxious    Lab Review     Lab Results   Component Value Date    PSA 6.9 12/30/21       Pathology  Prostate Biopsy: [place] 3+3=6 in 1 core, only 4% of core    Assessment and Plan:   Prostate Cancer   I had a lengthy discussion with the patient regarding his biopsy results and to review prostate cancer staging and treatment options. I explained that per AUA criteria he has low risk prostate cancer and therefore AS is recommend treatment. (Suspect PSAD is falsely elevated today and PSA elevation may have been 2/2 inflammation.) Rationale and protocol for AS were discussed and he expressed understanding and desire to proceed. Will FU 6 mos w/ PSA.    Will also trial Cialis for ED  "

## 2022-07-07 ENCOUNTER — OFFICE VISIT (OUTPATIENT)
Dept: OPTOMETRY | Facility: CLINIC | Age: 67
End: 2022-07-07
Payer: MEDICARE

## 2022-07-07 ENCOUNTER — CLINICAL SUPPORT (OUTPATIENT)
Dept: OPHTHALMOLOGY | Facility: CLINIC | Age: 67
End: 2022-07-07
Payer: MEDICARE

## 2022-07-07 ENCOUNTER — TELEPHONE (OUTPATIENT)
Dept: OPTOMETRY | Facility: CLINIC | Age: 67
End: 2022-07-07
Payer: MEDICARE

## 2022-07-07 DIAGNOSIS — H40.1132 PRIMARY OPEN ANGLE GLAUCOMA (POAG) OF BOTH EYES, MODERATE STAGE: Primary | ICD-10-CM

## 2022-07-07 PROCEDURE — 99999 PR PBB SHADOW E&M-EST. PATIENT-LVL II: ICD-10-PCS | Mod: PBBFAC,,, | Performed by: OPTOMETRIST

## 2022-07-07 PROCEDURE — 92014 PR EYE EXAM, EST PATIENT,COMPREHESV: ICD-10-PCS | Mod: S$GLB,,, | Performed by: OPTOMETRIST

## 2022-07-07 PROCEDURE — 3288F FALL RISK ASSESSMENT DOCD: CPT | Mod: CPTII,S$GLB,, | Performed by: OPTOMETRIST

## 2022-07-07 PROCEDURE — 1126F PR PAIN SEVERITY QUANTIFIED, NO PAIN PRESENT: ICD-10-PCS | Mod: CPTII,S$GLB,, | Performed by: OPTOMETRIST

## 2022-07-07 PROCEDURE — 1159F MED LIST DOCD IN RCRD: CPT | Mod: CPTII,S$GLB,, | Performed by: OPTOMETRIST

## 2022-07-07 PROCEDURE — 92083 EXTENDED VISUAL FIELD XM: CPT | Mod: S$GLB,,, | Performed by: OPTOMETRIST

## 2022-07-07 PROCEDURE — 3288F PR FALLS RISK ASSESSMENT DOCUMENTED: ICD-10-PCS | Mod: CPTII,S$GLB,, | Performed by: OPTOMETRIST

## 2022-07-07 PROCEDURE — 1101F PR PT FALLS ASSESS DOC 0-1 FALLS W/OUT INJ PAST YR: ICD-10-PCS | Mod: CPTII,S$GLB,, | Performed by: OPTOMETRIST

## 2022-07-07 PROCEDURE — 1159F PR MEDICATION LIST DOCUMENTED IN MEDICAL RECORD: ICD-10-PCS | Mod: CPTII,S$GLB,, | Performed by: OPTOMETRIST

## 2022-07-07 PROCEDURE — 1101F PT FALLS ASSESS-DOCD LE1/YR: CPT | Mod: CPTII,S$GLB,, | Performed by: OPTOMETRIST

## 2022-07-07 PROCEDURE — 99999 PR PBB SHADOW E&M-EST. PATIENT-LVL II: CPT | Mod: PBBFAC,,, | Performed by: OPTOMETRIST

## 2022-07-07 PROCEDURE — 92083 HUMPHREY VISUAL FIELD - OU - BOTH EYES: ICD-10-PCS | Mod: S$GLB,,, | Performed by: OPTOMETRIST

## 2022-07-07 PROCEDURE — 92014 COMPRE OPH EXAM EST PT 1/>: CPT | Mod: S$GLB,,, | Performed by: OPTOMETRIST

## 2022-07-07 PROCEDURE — 1126F AMNT PAIN NOTED NONE PRSNT: CPT | Mod: CPTII,S$GLB,, | Performed by: OPTOMETRIST

## 2022-07-07 NOTE — PROGRESS NOTES
HPI     67 Y/o male is here for HVF/OCT IOP check   Pt denies pain and discomfort   No f/f     Eye med: Latanoprost QHS OU     Last edited by Chip Hart MA on 7/7/2022  2:00 PM. (History)            Assessment /Plan     For exam results, see Encounter Report.    Primary open angle glaucoma (POAG) of both eyes, moderate stage  -     [facility] Visual Field - OU - Extended - Both Eyes  -Latanoprost QHS  -[facility] IOP mid teens    RTC 6 mo

## 2022-07-07 NOTE — TELEPHONE ENCOUNTER
----- Message from Rand Wilcox sent at 7/7/2022  6:54 AM CDT -----  Need orders for patient visual field and oct        Thank you.

## 2022-07-07 NOTE — PROGRESS NOTES
Visual field test done.  Patient stated no latex allergies used coverlet            Glasses Prescription     Sphere Cylinder Axis Dist VA Add   Right -2.00 +2.75 125 20/20 +2.50   Left -1.75 +3.00 070 20/25 +2.50   Type: Bifocal   Expiration Date: 1/21/2023        Ordered, Authorized, and Signed By: Fortunato Curry,

## 2022-07-20 ENCOUNTER — PATIENT OUTREACH (OUTPATIENT)
Dept: ADMINISTRATIVE | Facility: HOSPITAL | Age: 67
End: 2022-07-20
Payer: MEDICARE

## 2022-08-09 ENCOUNTER — OFFICE VISIT (OUTPATIENT)
Dept: URGENT CARE | Facility: CLINIC | Age: 67
End: 2022-08-09
Payer: MEDICARE

## 2022-08-09 VITALS
SYSTOLIC BLOOD PRESSURE: 115 MMHG | HEART RATE: 60 BPM | BODY MASS INDEX: 26.32 KG/M2 | DIASTOLIC BLOOD PRESSURE: 73 MMHG | WEIGHT: 188 LBS | RESPIRATION RATE: 18 BRPM | OXYGEN SATURATION: 100 % | TEMPERATURE: 96 F | HEIGHT: 71 IN

## 2022-08-09 DIAGNOSIS — U07.1 COVID-19: Primary | ICD-10-CM

## 2022-08-09 DIAGNOSIS — Z20.822 EXPOSURE TO COVID-19 VIRUS: ICD-10-CM

## 2022-08-09 DIAGNOSIS — U07.1 COVID-19 VIRUS DETECTED: ICD-10-CM

## 2022-08-09 DIAGNOSIS — R05.9 COUGH: ICD-10-CM

## 2022-08-09 LAB
CTP QC/QA: YES
SARS-COV-2 RDRP RESP QL NAA+PROBE: POSITIVE

## 2022-08-09 PROCEDURE — 3074F SYST BP LT 130 MM HG: CPT | Mod: CPTII,S$GLB,,

## 2022-08-09 PROCEDURE — U0002 COVID-19 LAB TEST NON-CDC: HCPCS | Mod: QW,S$GLB,,

## 2022-08-09 PROCEDURE — 3074F PR MOST RECENT SYSTOLIC BLOOD PRESSURE < 130 MM HG: ICD-10-PCS | Mod: CPTII,S$GLB,,

## 2022-08-09 PROCEDURE — 3078F DIAST BP <80 MM HG: CPT | Mod: CPTII,S$GLB,,

## 2022-08-09 PROCEDURE — 1159F MED LIST DOCD IN RCRD: CPT | Mod: CPTII,S$GLB,,

## 2022-08-09 PROCEDURE — U0002: ICD-10-PCS | Mod: QW,S$GLB,,

## 2022-08-09 PROCEDURE — 1125F PR PAIN SEVERITY QUANTIFIED, PAIN PRESENT: ICD-10-PCS | Mod: CPTII,S$GLB,,

## 2022-08-09 PROCEDURE — 3008F PR BODY MASS INDEX (BMI) DOCUMENTED: ICD-10-PCS | Mod: CPTII,S$GLB,,

## 2022-08-09 PROCEDURE — 3078F PR MOST RECENT DIASTOLIC BLOOD PRESSURE < 80 MM HG: ICD-10-PCS | Mod: CPTII,S$GLB,,

## 2022-08-09 PROCEDURE — 1159F PR MEDICATION LIST DOCUMENTED IN MEDICAL RECORD: ICD-10-PCS | Mod: CPTII,S$GLB,,

## 2022-08-09 PROCEDURE — 99203 PR OFFICE/OUTPT VISIT, NEW, LEVL III, 30-44 MIN: ICD-10-PCS | Mod: S$GLB,,,

## 2022-08-09 PROCEDURE — 1125F AMNT PAIN NOTED PAIN PRSNT: CPT | Mod: CPTII,S$GLB,,

## 2022-08-09 PROCEDURE — 1160F RVW MEDS BY RX/DR IN RCRD: CPT | Mod: CPTII,S$GLB,,

## 2022-08-09 PROCEDURE — 1160F PR REVIEW ALL MEDS BY PRESCRIBER/CLIN PHARMACIST DOCUMENTED: ICD-10-PCS | Mod: CPTII,S$GLB,,

## 2022-08-09 PROCEDURE — 3008F BODY MASS INDEX DOCD: CPT | Mod: CPTII,S$GLB,,

## 2022-08-09 PROCEDURE — 99203 OFFICE O/P NEW LOW 30 MIN: CPT | Mod: S$GLB,,,

## 2022-08-09 RX ORDER — BENZONATATE 200 MG/1
200 CAPSULE ORAL 3 TIMES DAILY PRN
Qty: 30 CAPSULE | Refills: 0 | Status: SHIPPED | OUTPATIENT
Start: 2022-08-09 | End: 2022-08-19

## 2022-08-09 RX ORDER — GENTAMICIN SULFATE 40 MG/ML
INJECTION, SOLUTION INTRAMUSCULAR; INTRAVENOUS
COMMUNITY
Start: 2022-02-14 | End: 2023-12-08 | Stop reason: CLARIF

## 2022-08-09 NOTE — PATIENT INSTRUCTIONS
You have tested positive for COVID-19 today.       ISOLATION  If you tested positive and do not have symptoms, you must isolate for 5 days starting on the day of the positive test. I     If you tested positive and have symptoms, you must isolate for 5 days starting on the day of the first symptoms,  not the day of the positive test.     This is the most important part, both the CDC and the [facility] emphasize that you do not test out of isolation.     After 5 days, if your symptoms have improved and you have not had fever on day 5, you can return to the community on day 6- NO TESTING REQUIRED!      In fact, we do not retest if you were positive in the last 90 days.     After your 5 days of isolation are completed, the CDC recommends strict mask use for the first 5 days that you come out of isolation.      CDC Testing and Quarantine Guidelines for patients with exposure to a known-positive COVID-19 person:  ·     A 'close exposure' is defined as anyone who has had an exposure (masked or unmasked) to a known COVID -19 positive person          within 6 feet of someone          for a cumulative total of 15 minutes or more over a 24-hour period.  ·     vaccinated Have been boosted or completed the primary series of Pfizer or Moderna vaccine within the last 6 months or completed the primary series of J&J vaccine within the last 2 months and/or had a positive test within 90 days          do NOT need to quarantine after contact with someone who had COVID-19 unless they have symptoms.          fully vaccinated people who have not had a positive test within 90 days, should get tested 3-5 days after their exposure, even if they don't have symptoms and wear a mask indoors in public for 10 days following exposure or until their test result is negative on day 5.          If you develop symptoms test and quarantine.     ·     Unvaccinated, or are more than six months out from their second mRNA dose (or more than 2 months after the J&J  vaccine) and not yet boosted,  and/or NOT had a positive test within 90 days and meet 'close exposure'  you are required by CDC guidelines to quarantine for at least 5 days from time of exposure followed by 5 days of strict masking. It is recommended, but not required to test after 5 days, unless you develop symptoms, in which case you should test at that time.  If you do decide to test at 5 days and are asymptomatic, the risk is that if you test without symptoms on Day 5 for example) and you are positive, your 5 day isolation begins on that day, and you turned your 5 day quarantine into 10 days.          If your exposure does not meet the above definition, you can return to your normal daily activities to include social distancing, wearing a mask and frequent handwashing.  Alternatively, if a 5-day quarantine is not feasible, it is imperative that an exposed person wear a well-fitting mask at all times when around others for 10 days after exposure.          - Rest.    - Drink plenty of fluids.    - Acetaminophen (tylenol) or Ibuprofen (advil,motrin) as directed as needed for fever/pain. Avoid tylenol if you have a history of liver disease. Do not take ibuprofen if you have a history of GI bleeding, kidney disease, or if you take blood thinners.   - Ibuprofen dosing for adults: 400 mg by mouth every 4-6 hours as needed. Max: 2400 mg/day; Info: use lowest effective dose, shortest effective treatment duration; give w/ food if GI upset occurs.  - Ibuprofen dosing for children: [6 mo-12 yo] Dose: 5-10 mg/kg/dose by mouth every 6-8h as needed; Max: 40 mg/kg/day; Info: use lower dose for fever <102.5 F, higher dose for fever >102.5 F; use shortest effective tx duration; give w/ food if GI upset occurs. [11 yo and older] Dose: 200-400 mg by mouth every 4-6 hours as needed; Max: 1200 mg/day; Info: use lowest effective dose, shortest effective tx duration; give w/ food if GI upset occurs.  - Tylenol dosing for adults: [By  mouth route, immediate-release form] Dose: 325-1000 mg by mouth every 4-6h as needed; Max: 1 g/4h and 4 g/day from all sources. [By mouth route, extended-release form] Dose: 650-1300 mg Extended Release by mouth every 8h as needed; Max: 4 g/day from all sources.   - Tylenol dosing for children: 6-10 yo [ oral tablet/capsule ] Dose: 1 tab/cap by mouth every 4-6h as needed; Max: 5 tabs or caps/24h; Info: do not exceed 75 mg/kg/day, up to 1 g/4h and 4 g/day, from all sources. 11 yo and older [ oral tablet/capsule ] Dose: 1-2 tabs/caps by mouth every 4-6h as needed; Max: 10 tabs or caps/24h; Info: do not exceed 1 g/4h and 4 g/day from all sources.    - You must understand that you have received an Urgent Care treatment only and that you may be released before all of your medical problems are known or treated.   - You, the patient, will arrange for follow up care as instructed.   - If your condition worsens or fails to improve we recommend that you receive another evaluation at the ER immediately or contact your PCP to discuss your concerns or return here.   - Follow up with your PCP or specialty clinic as directed in the next 1-2 weeks if not improved or as needed.  You can call [phone] to schedule an appointment with the appropriate provider.    If your symptoms do not improve or worsen, go to the emergency room immediately.

## 2022-08-09 NOTE — PROGRESS NOTES
"Subjective:       Patient ID: Herbie Naqvi is a 67 y.o. male.    Vitals:  height is 5' 11" (1.803 m) and weight is 85.3 kg (188 lb). His temperature is 96.4 °F (35.8 °C). His blood pressure is 115/73 and his pulse is 60. His respiration is 18 and oxygen saturation is 100%.     Chief Complaint: COVID-19 Concerns    Pt states recent visit to [place] this past Thursday.   Pt states he thinks its just a cold that started Saturday. He is only complaining of a cough. He was exposed to his wife who recently tested positive for covid. He is fully vaccinated against covid. He denies fever, body aches or chills.     Other  This is a new problem. The current episode started in the past 7 days. The problem occurs constantly. The problem has been unchanged. Associated symptoms include coughing and weakness. Pertinent negatives include no abdominal pain, anorexia, arthralgias, change in bowel habit, chest pain, chills, congestion, diaphoresis, fatigue, fever, headaches, joint swelling, myalgias, nausea, neck pain, numbness, rash, sore throat, swollen glands, urinary symptoms, vertigo, visual change or vomiting. He has tried nothing for the symptoms.       Constitution: Negative for chills, sweating, fatigue and fever.   HENT: Negative for ear pain, congestion, postnasal drip, sinus pain, sinus pressure and sore throat.    Neck: Negative for neck pain.   Cardiovascular: Negative for chest pain.   Respiratory: Positive for cough. Negative for shortness of breath.    Gastrointestinal: Negative for abdominal pain, nausea and vomiting.   Musculoskeletal: Negative for joint pain, joint swelling and muscle ache.   Skin: Negative for rash.   Neurological: Negative for history of vertigo, headaches, disorientation, altered mental status and numbness.   Psychiatric/Behavioral: Negative for altered mental status, disorientation and confusion.       Objective:      Physical Exam   Constitutional: He is oriented to person, place, and time. He " appears well-developed. He is cooperative.  Non-toxic appearance. He does not appear ill. No distress.      Comments:Patient sits comfortably in exam chair. Answers questions in complete sentences. Does not show any signs of distress or discoloration.        HENT:   Head: Normocephalic and atraumatic.   Ears:   Right Ear: Hearing, tympanic membrane, external ear and ear canal normal.   Left Ear: Hearing, tympanic membrane, external ear and ear canal normal.   Nose: Nose normal. No mucosal edema, rhinorrhea, nasal deformity or congestion. No epistaxis. Right sinus exhibits no maxillary sinus tenderness and no frontal sinus tenderness. Left sinus exhibits no maxillary sinus tenderness and no frontal sinus tenderness.   Mouth/Throat: Uvula is midline, oropharynx is clear and moist and mucous membranes are normal. No trismus in the jaw. Normal dentition. No uvula swelling. No oropharyngeal exudate, posterior oropharyngeal edema or posterior oropharyngeal erythema.   Eyes: Conjunctivae and lids are normal. No scleral icterus.   Neck: Trachea normal and phonation normal. Neck supple. No edema present. No erythema present. No neck rigidity present.   Cardiovascular: Normal rate, regular rhythm, normal heart sounds and normal pulses.   Pulmonary/Chest: Effort normal and breath sounds normal. No stridor. No respiratory distress. He has no decreased breath sounds. He has no wheezes. He has no rhonchi. He has no rales.   Abdominal: Normal appearance.   Musculoskeletal: Normal range of motion.         General: No deformity. Normal range of motion.   Neurological: He is alert and oriented to person, place, and time. He exhibits normal muscle tone. Coordination normal.   Skin: Skin is warm, dry, intact, not diaphoretic and not pale.   Psychiatric: His speech is normal and behavior is normal. Judgment and thought content normal.   Nursing note and vitals reviewed.        Results for orders placed or performed in visit on 08/09/22    POCT COVID-19 Rapid Screening   Result Value Ref Range    POC Rapid COVID Positive (A) Negative     Acceptable Yes      2    Assessment:       1. COVID-19    2. Exposure to COVID-19 virus    3. Cough          Plan:         COVID-19    Exposure to COVID-19 virus  -     POCT COVID-19 Rapid Screening    Cough  -     benzonatate (TESSALON) 200 MG capsule; Take 1 capsule (200 mg total) by mouth 3 (three) times daily as needed for Cough.  Dispense: 30 capsule; Refill: 0                 Patient Instructions    You have tested positive for COVID-19 today.       ISOLATION  If you tested positive and do not have symptoms, you must isolate for 5 days starting on the day of the positive test. I     If you tested positive and have symptoms, you must isolate for 5 days starting on the day of the first symptoms,  not the day of the positive test.     This is the most important part, both the CDC and the [facility] emphasize that you do not test out of isolation.     After 5 days, if your symptoms have improved and you have not had fever on day 5, you can return to the community on day 6- NO TESTING REQUIRED!      In fact, we do not retest if you were positive in the last 90 days.     After your 5 days of isolation are completed, the CDC recommends strict mask use for the first 5 days that you come out of isolation.      CDC Testing and Quarantine Guidelines for patients with exposure to a known-positive COVID-19 person:  ·     A 'close exposure' is defined as anyone who has had an exposure (masked or unmasked) to a known COVID -19 positive person          within 6 feet of someone          for a cumulative total of 15 minutes or more over a 24-hour period.  ·     vaccinated Have been boosted or completed the primary series of Pfizer or Moderna vaccine within the last 6 months or completed the primary series of J&J vaccine within the last 2 months and/or had a positive test within 90 days          do NOT need to quarantine  after contact with someone who had COVID-19 unless they have symptoms.          fully vaccinated people who have not had a positive test within 90 days, should get tested 3-5 days after their exposure, even if they don't have symptoms and wear a mask indoors in public for 10 days following exposure or until their test result is negative on day 5.          If you develop symptoms test and quarantine.     ·     Unvaccinated, or are more than six months out from their second mRNA dose (or more than 2 months after the J&J vaccine) and not yet boosted,  and/or NOT had a positive test within 90 days and meet 'close exposure'  you are required by CDC guidelines to quarantine for at least 5 days from time of exposure followed by 5 days of strict masking. It is recommended, but not required to test after 5 days, unless you develop symptoms, in which case you should test at that time.  If you do decide to test at 5 days and are asymptomatic, the risk is that if you test without symptoms on Day 5 for example) and you are positive, your 5 day isolation begins on that day, and you turned your 5 day quarantine into 10 days.          If your exposure does not meet the above definition, you can return to your normal daily activities to include social distancing, wearing a mask and frequent handwashing.  Alternatively, if a 5-day quarantine is not feasible, it is imperative that an exposed person wear a well-fitting mask at all times when around others for 10 days after exposure.          - Rest.    - Drink plenty of fluids.    - Acetaminophen (tylenol) or Ibuprofen (advil,motrin) as directed as needed for fever/pain. Avoid tylenol if you have a history of liver disease. Do not take ibuprofen if you have a history of GI bleeding, kidney disease, or if you take blood thinners.   - Ibuprofen dosing for adults: 400 mg by mouth every 4-6 hours as needed. Max: 2400 mg/day; Info: use lowest effective dose, shortest effective treatment  duration; give w/ food if GI upset occurs.  - Ibuprofen dosing for children: [6 mo-12 yo] Dose: 5-10 mg/kg/dose by mouth every 6-8h as needed; Max: 40 mg/kg/day; Info: use lower dose for fever <102.5 F, higher dose for fever >102.5 F; use shortest effective tx duration; give w/ food if GI upset occurs. [11 yo and older] Dose: 200-400 mg by mouth every 4-6 hours as needed; Max: 1200 mg/day; Info: use lowest effective dose, shortest effective tx duration; give w/ food if GI upset occurs.  - Tylenol dosing for adults: [By mouth route, immediate-release form] Dose: 325-1000 mg by mouth every 4-6h as needed; Max: 1 g/4h and 4 g/day from all sources. [By mouth route, extended-release form] Dose: 650-1300 mg Extended Release by mouth every 8h as needed; Max: 4 g/day from all sources.   - Tylenol dosing for children: 6-12 yo [ oral tablet/capsule ] Dose: 1 tab/cap by mouth every 4-6h as needed; Max: 5 tabs or caps/24h; Info: do not exceed 75 mg/kg/day, up to 1 g/4h and 4 g/day, from all sources. 11 yo and older [ oral tablet/capsule ] Dose: 1-2 tabs/caps by mouth every 4-6h as needed; Max: 10 tabs or caps/24h; Info: do not exceed 1 g/4h and 4 g/day from all sources.    - You must understand that you have received an Urgent Care treatment only and that you may be released before all of your medical problems are known or treated.   - You, the patient, will arrange for follow up care as instructed.   - If your condition worsens or fails to improve we recommend that you receive another evaluation at the ER immediately or contact your PCP to discuss your concerns or return here.   - Follow up with your PCP or specialty clinic as directed in the next 1-2 weeks if not improved or as needed.  You can call [phone] to schedule an appointment with the appropriate provider.    If your symptoms do not improve or worsen, go to the emergency room immediately.

## 2022-09-22 ENCOUNTER — OFFICE VISIT (OUTPATIENT)
Dept: UROLOGY | Facility: CLINIC | Age: 67
End: 2022-09-22
Attending: UROLOGY
Payer: MEDICARE

## 2022-09-22 VITALS
HEIGHT: 70 IN | DIASTOLIC BLOOD PRESSURE: 76 MMHG | OXYGEN SATURATION: 98 % | BODY MASS INDEX: 25.77 KG/M2 | SYSTOLIC BLOOD PRESSURE: 127 MMHG | RESPIRATION RATE: 16 BRPM | WEIGHT: 180 LBS | HEART RATE: 72 BPM

## 2022-09-22 DIAGNOSIS — C61 PROSTATE CANCER: Primary | ICD-10-CM

## 2022-09-22 PROCEDURE — 3288F FALL RISK ASSESSMENT DOCD: CPT | Mod: CPTII,S$GLB,, | Performed by: UROLOGY

## 2022-09-22 PROCEDURE — 1126F AMNT PAIN NOTED NONE PRSNT: CPT | Mod: CPTII,S$GLB,, | Performed by: UROLOGY

## 2022-09-22 PROCEDURE — 3288F PR FALLS RISK ASSESSMENT DOCUMENTED: ICD-10-PCS | Mod: CPTII,S$GLB,, | Performed by: UROLOGY

## 2022-09-22 PROCEDURE — 3008F BODY MASS INDEX DOCD: CPT | Mod: CPTII,S$GLB,, | Performed by: UROLOGY

## 2022-09-22 PROCEDURE — 1160F RVW MEDS BY RX/DR IN RCRD: CPT | Mod: CPTII,S$GLB,, | Performed by: UROLOGY

## 2022-09-22 PROCEDURE — 1126F PR PAIN SEVERITY QUANTIFIED, NO PAIN PRESENT: ICD-10-PCS | Mod: CPTII,S$GLB,, | Performed by: UROLOGY

## 2022-09-22 PROCEDURE — 1159F PR MEDICATION LIST DOCUMENTED IN MEDICAL RECORD: ICD-10-PCS | Mod: CPTII,S$GLB,, | Performed by: UROLOGY

## 2022-09-22 PROCEDURE — 99214 PR OFFICE/OUTPT VISIT, EST, LEVL IV, 30-39 MIN: ICD-10-PCS | Mod: S$GLB,,, | Performed by: UROLOGY

## 2022-09-22 PROCEDURE — 3074F SYST BP LT 130 MM HG: CPT | Mod: CPTII,S$GLB,, | Performed by: UROLOGY

## 2022-09-22 PROCEDURE — 1160F PR REVIEW ALL MEDS BY PRESCRIBER/CLIN PHARMACIST DOCUMENTED: ICD-10-PCS | Mod: CPTII,S$GLB,, | Performed by: UROLOGY

## 2022-09-22 PROCEDURE — 3078F PR MOST RECENT DIASTOLIC BLOOD PRESSURE < 80 MM HG: ICD-10-PCS | Mod: CPTII,S$GLB,, | Performed by: UROLOGY

## 2022-09-22 PROCEDURE — 99214 OFFICE O/P EST MOD 30 MIN: CPT | Mod: S$GLB,,, | Performed by: UROLOGY

## 2022-09-22 PROCEDURE — 1101F PR PT FALLS ASSESS DOC 0-1 FALLS W/OUT INJ PAST YR: ICD-10-PCS | Mod: CPTII,S$GLB,, | Performed by: UROLOGY

## 2022-09-22 PROCEDURE — 1159F MED LIST DOCD IN RCRD: CPT | Mod: CPTII,S$GLB,, | Performed by: UROLOGY

## 2022-09-22 PROCEDURE — 3078F DIAST BP <80 MM HG: CPT | Mod: CPTII,S$GLB,, | Performed by: UROLOGY

## 2022-09-22 PROCEDURE — 1101F PT FALLS ASSESS-DOCD LE1/YR: CPT | Mod: CPTII,S$GLB,, | Performed by: UROLOGY

## 2022-09-22 PROCEDURE — 3008F PR BODY MASS INDEX (BMI) DOCUMENTED: ICD-10-PCS | Mod: CPTII,S$GLB,, | Performed by: UROLOGY

## 2022-09-22 PROCEDURE — 3074F PR MOST RECENT SYSTOLIC BLOOD PRESSURE < 130 MM HG: ICD-10-PCS | Mod: CPTII,S$GLB,, | Performed by: UROLOGY

## 2022-09-22 NOTE — PROGRESS NOTES
"Subjective:      Herbie Naqvi is a 67 y.o. y.o. male who returns today regarding his prostate cancer.    He is s/p TRUS/biopsy on 2/14/22 that demonstrated low risk prostate cancer ([place] 3+3=6 in 1 core).  Opted for AS. Here for FU. No c/o today.    The following portions of the patient's history were reviewed and updated as appropriate: allergies, current medications, past family history, past medical history, past social history, past surgical history and problem list.       Objective:   Vitals: /76 (BP Location: Left arm, Patient Position: Sitting, BP Method: Large (Automatic))   Pulse 72   Resp 16   Ht 5' 10" (1.778 m)   Wt 81.6 kg (180 lb)   SpO2 98%   BMI 25.83 kg/m²      Physical Exam   General: alert and oriented, no acute distress  Respiratory: Symmetric expansion, non-labored breathing  Neuro: no gross deficits  Psych: normal judgment and insight, normal mood/affect and non-anxious    Lab Review     Lab Results   Component Value Date    PSA 6.9 12/30/21       Pathology  Prostate Biopsy: [place] 3+3=6 in 1 core, only 4% of core    Assessment and Plan:   Prostate Cancer  -- Continue AS - check PSA today  -- FU 6 mos w/ PSA  "

## 2022-11-08 ENCOUNTER — LAB VISIT (OUTPATIENT)
Dept: LAB | Facility: HOSPITAL | Age: 67
End: 2022-11-08
Attending: FAMILY MEDICINE
Payer: MEDICARE

## 2022-11-08 ENCOUNTER — OFFICE VISIT (OUTPATIENT)
Dept: FAMILY MEDICINE | Facility: CLINIC | Age: 67
End: 2022-11-08
Payer: MEDICARE

## 2022-11-08 VITALS
WEIGHT: 183.63 LBS | BODY MASS INDEX: 26.29 KG/M2 | OXYGEN SATURATION: 96 % | HEIGHT: 70 IN | TEMPERATURE: 98 F | DIASTOLIC BLOOD PRESSURE: 60 MMHG | SYSTOLIC BLOOD PRESSURE: 108 MMHG | HEART RATE: 59 BPM | RESPIRATION RATE: 18 BRPM

## 2022-11-08 DIAGNOSIS — Z23 INFLUENZA VACCINE NEEDED: ICD-10-CM

## 2022-11-08 DIAGNOSIS — Z12.11 ENCOUNTER FOR COLORECTAL CANCER SCREENING: ICD-10-CM

## 2022-11-08 DIAGNOSIS — R79.9 ABNORMAL FINDING OF BLOOD CHEMISTRY, UNSPECIFIED: ICD-10-CM

## 2022-11-08 DIAGNOSIS — R73.03 PREDIABETES: ICD-10-CM

## 2022-11-08 DIAGNOSIS — C61 PROSTATE CANCER: ICD-10-CM

## 2022-11-08 DIAGNOSIS — Z13.6 ENCOUNTER FOR ABDOMINAL AORTIC ANEURYSM (AAA) SCREENING: ICD-10-CM

## 2022-11-08 DIAGNOSIS — Z00.00 ANNUAL PHYSICAL EXAM: Primary | ICD-10-CM

## 2022-11-08 DIAGNOSIS — Z12.12 ENCOUNTER FOR COLORECTAL CANCER SCREENING: ICD-10-CM

## 2022-11-08 DIAGNOSIS — Z00.00 ANNUAL PHYSICAL EXAM: ICD-10-CM

## 2022-11-08 DIAGNOSIS — L60.9 NAIL ABNORMALITY: ICD-10-CM

## 2022-11-08 LAB
ALBUMIN SERPL BCP-MCNC: 4.1 G/DL (ref 3.5–5.2)
ALP SERPL-CCNC: 66 U/L (ref 55–135)
ALT SERPL W/O P-5'-P-CCNC: 22 U/L (ref 10–44)
ANION GAP SERPL CALC-SCNC: 8 MMOL/L (ref 8–16)
AST SERPL-CCNC: 24 U/L (ref 10–40)
BASOPHILS # BLD AUTO: 0.05 K/UL (ref 0–0.2)
BASOPHILS NFR BLD: 1.4 % (ref 0–1.9)
BILIRUB SERPL-MCNC: 0.5 MG/DL (ref 0.1–1)
BUN SERPL-MCNC: 11 MG/DL (ref 8–23)
CALCIUM SERPL-MCNC: 9.4 MG/DL (ref 8.7–10.5)
CHLORIDE SERPL-SCNC: 107 MMOL/L (ref 95–110)
CHOLEST SERPL-MCNC: 181 MG/DL (ref 120–199)
CHOLEST/HDLC SERPL: 3.5 {RATIO} (ref 2–5)
CO2 SERPL-SCNC: 23 MMOL/L (ref 23–29)
CREAT SERPL-MCNC: 0.8 MG/DL (ref 0.5–1.4)
DIFFERENTIAL METHOD: ABNORMAL
EOSINOPHIL # BLD AUTO: 0.1 K/UL (ref 0–0.5)
EOSINOPHIL NFR BLD: 3.3 % (ref 0–8)
ERYTHROCYTE [DISTWIDTH] IN BLOOD BY AUTOMATED COUNT: 14.3 % (ref 11.5–14.5)
EST. GFR  (NO RACE VARIABLE): >60 ML/MIN/1.73 M^2
ESTIMATED AVG GLUCOSE: 120 MG/DL (ref 68–131)
GLUCOSE SERPL-MCNC: 100 MG/DL (ref 70–110)
HBA1C MFR BLD: 5.8 % (ref 4–5.6)
HCT VFR BLD AUTO: 41.4 % (ref 40–54)
HDLC SERPL-MCNC: 52 MG/DL (ref 40–75)
HDLC SERPL: 28.7 % (ref 20–50)
HGB BLD-MCNC: 13.5 G/DL (ref 14–18)
IMM GRANULOCYTES # BLD AUTO: 0 K/UL (ref 0–0.04)
IMM GRANULOCYTES NFR BLD AUTO: 0 % (ref 0–0.5)
LDLC SERPL CALC-MCNC: 110 MG/DL (ref 63–159)
LYMPHOCYTES # BLD AUTO: 1.7 K/UL (ref 1–4.8)
LYMPHOCYTES NFR BLD: 45.8 % (ref 18–48)
MCH RBC QN AUTO: 30.3 PG (ref 27–31)
MCHC RBC AUTO-ENTMCNC: 32.6 G/DL (ref 32–36)
MCV RBC AUTO: 93 FL (ref 82–98)
MONOCYTES # BLD AUTO: 0.4 K/UL (ref 0.3–1)
MONOCYTES NFR BLD: 12.1 % (ref 4–15)
NEUTROPHILS # BLD AUTO: 1.4 K/UL (ref 1.8–7.7)
NEUTROPHILS NFR BLD: 37.4 % (ref 38–73)
NONHDLC SERPL-MCNC: 129 MG/DL
NRBC BLD-RTO: 0 /100 WBC
PLATELET # BLD AUTO: 233 K/UL (ref 150–450)
PMV BLD AUTO: 9.5 FL (ref 9.2–12.9)
POTASSIUM SERPL-SCNC: 4.2 MMOL/L (ref 3.5–5.1)
PROT SERPL-MCNC: 6.9 G/DL (ref 6–8.4)
RBC # BLD AUTO: 4.45 M/UL (ref 4.6–6.2)
SODIUM SERPL-SCNC: 138 MMOL/L (ref 136–145)
TRIGL SERPL-MCNC: 95 MG/DL (ref 30–150)
TSH SERPL DL<=0.005 MIU/L-ACNC: 0.62 UIU/ML (ref 0.4–4)
WBC # BLD AUTO: 3.65 K/UL (ref 3.9–12.7)

## 2022-11-08 PROCEDURE — 3078F DIAST BP <80 MM HG: CPT | Mod: CPTII,S$GLB,, | Performed by: FAMILY MEDICINE

## 2022-11-08 PROCEDURE — 1159F MED LIST DOCD IN RCRD: CPT | Mod: CPTII,S$GLB,, | Performed by: FAMILY MEDICINE

## 2022-11-08 PROCEDURE — 99397 PER PM REEVAL EST PAT 65+ YR: CPT | Mod: S$GLB,,, | Performed by: FAMILY MEDICINE

## 2022-11-08 PROCEDURE — 90694 FLU VACCINE - QUADRIVALENT - ADJUVANTED: ICD-10-PCS | Mod: S$GLB,,, | Performed by: FAMILY MEDICINE

## 2022-11-08 PROCEDURE — 3078F PR MOST RECENT DIASTOLIC BLOOD PRESSURE < 80 MM HG: ICD-10-PCS | Mod: CPTII,S$GLB,, | Performed by: FAMILY MEDICINE

## 2022-11-08 PROCEDURE — 36415 COLL VENOUS BLD VENIPUNCTURE: CPT | Mod: PO | Performed by: FAMILY MEDICINE

## 2022-11-08 PROCEDURE — 83036 HEMOGLOBIN GLYCOSYLATED A1C: CPT | Performed by: FAMILY MEDICINE

## 2022-11-08 PROCEDURE — 80061 LIPID PANEL: CPT | Performed by: FAMILY MEDICINE

## 2022-11-08 PROCEDURE — 3288F FALL RISK ASSESSMENT DOCD: CPT | Mod: CPTII,S$GLB,, | Performed by: FAMILY MEDICINE

## 2022-11-08 PROCEDURE — G0008 ADMIN INFLUENZA VIRUS VAC: HCPCS | Mod: S$GLB,,, | Performed by: FAMILY MEDICINE

## 2022-11-08 PROCEDURE — 1101F PR PT FALLS ASSESS DOC 0-1 FALLS W/OUT INJ PAST YR: ICD-10-PCS | Mod: CPTII,S$GLB,, | Performed by: FAMILY MEDICINE

## 2022-11-08 PROCEDURE — 1126F PR PAIN SEVERITY QUANTIFIED, NO PAIN PRESENT: ICD-10-PCS | Mod: CPTII,S$GLB,, | Performed by: FAMILY MEDICINE

## 2022-11-08 PROCEDURE — 3074F PR MOST RECENT SYSTOLIC BLOOD PRESSURE < 130 MM HG: ICD-10-PCS | Mod: CPTII,S$GLB,, | Performed by: FAMILY MEDICINE

## 2022-11-08 PROCEDURE — 85025 COMPLETE CBC W/AUTO DIFF WBC: CPT | Performed by: FAMILY MEDICINE

## 2022-11-08 PROCEDURE — G0008 FLU VACCINE - QUADRIVALENT - ADJUVANTED: ICD-10-PCS | Mod: S$GLB,,, | Performed by: FAMILY MEDICINE

## 2022-11-08 PROCEDURE — 84443 ASSAY THYROID STIM HORMONE: CPT | Performed by: FAMILY MEDICINE

## 2022-11-08 PROCEDURE — 1159F PR MEDICATION LIST DOCUMENTED IN MEDICAL RECORD: ICD-10-PCS | Mod: CPTII,S$GLB,, | Performed by: FAMILY MEDICINE

## 2022-11-08 PROCEDURE — 80053 COMPREHEN METABOLIC PANEL: CPT | Performed by: FAMILY MEDICINE

## 2022-11-08 PROCEDURE — 99999 PR PBB SHADOW E&M-EST. PATIENT-LVL IV: ICD-10-PCS | Mod: PBBFAC,,, | Performed by: FAMILY MEDICINE

## 2022-11-08 PROCEDURE — 3008F BODY MASS INDEX DOCD: CPT | Mod: CPTII,S$GLB,, | Performed by: FAMILY MEDICINE

## 2022-11-08 PROCEDURE — 1126F AMNT PAIN NOTED NONE PRSNT: CPT | Mod: CPTII,S$GLB,, | Performed by: FAMILY MEDICINE

## 2022-11-08 PROCEDURE — 3288F PR FALLS RISK ASSESSMENT DOCUMENTED: ICD-10-PCS | Mod: CPTII,S$GLB,, | Performed by: FAMILY MEDICINE

## 2022-11-08 PROCEDURE — 3008F PR BODY MASS INDEX (BMI) DOCUMENTED: ICD-10-PCS | Mod: CPTII,S$GLB,, | Performed by: FAMILY MEDICINE

## 2022-11-08 PROCEDURE — 99397 PR PREVENTIVE VISIT,EST,65 & OVER: ICD-10-PCS | Mod: S$GLB,,, | Performed by: FAMILY MEDICINE

## 2022-11-08 PROCEDURE — 90694 VACC AIIV4 NO PRSRV 0.5ML IM: CPT | Mod: S$GLB,,, | Performed by: FAMILY MEDICINE

## 2022-11-08 PROCEDURE — 3074F SYST BP LT 130 MM HG: CPT | Mod: CPTII,S$GLB,, | Performed by: FAMILY MEDICINE

## 2022-11-08 PROCEDURE — 1101F PT FALLS ASSESS-DOCD LE1/YR: CPT | Mod: CPTII,S$GLB,, | Performed by: FAMILY MEDICINE

## 2022-11-08 PROCEDURE — 99999 PR PBB SHADOW E&M-EST. PATIENT-LVL IV: CPT | Mod: PBBFAC,,, | Performed by: FAMILY MEDICINE

## 2022-11-08 NOTE — PROGRESS NOTES
Health Maintenance Due   Topic     Pneumococcal Vaccines (Age 65+) (1 - PCV) Pt decline      Shingles Vaccine (1 of 2)  hx chicken pox. Notified pt can get vaccine at pharmacy      Abdominal Aortic Aneurysm Screening  Consult pcp      Influenza Vaccine (1) Pt agree to get today      Colorectal Cancer Screening  Pending order

## 2022-11-08 NOTE — PROGRESS NOTES
Subjective:       Patient ID: Herbie Naqvi is a 67 y.o. male.    Chief Complaint: Annual Exam      HPI  67-year-old male presents for annual exam.  States he would like Dermatology referral for his nails.  States this started few weeks ago.  States his nails are brittle.  Would like refills on his medications.        Review of Systems   Constitutional: Negative.    HENT: Negative.     Respiratory: Negative.     Cardiovascular: Negative.    Gastrointestinal: Negative.    Endocrine: Negative.    Genitourinary: Negative.    Musculoskeletal: Negative.    Neurological: Negative.    Psychiatric/Behavioral: Negative.          History reviewed. No pertinent past medical history.  Past Surgical History:   Procedure Laterality Date    COLONOSCOPY N/A 11/30/2017    Procedure: COLONOSCOPY;  Surgeon: John Roman MD;  Location: [facility];  Service: Endoscopy;  Laterality: N/A;  colonoscopy confirmed    wrist broke       rt side wrist     Family History   Problem Relation Age of Onset    Diabetes Mother     Heart disease Father     Hyperlipidemia Father      Social History     Socioeconomic History    Marital status:    Tobacco Use    Smoking status: Former     Types: Cigarettes     Start date: 3/25/2014    Smokeless tobacco: Never   Substance and Sexual Activity    Alcohol use: No     Comment: former drinker social stop drinking    Drug use: No    Sexual activity: Yes     Partners: Female     Birth control/protection: None     Comment:        Current Outpatient Medications:     latanoprost 0.005 % ophthalmic solution, Place 1 drop into both eyes every evening., Disp: 2.5 mL, Rfl: 6    meloxicam (MOBIC) 15 MG tablet, Take 1 tablet (15 mg total) by mouth once daily., Disp: 90 tablet, Rfl: 3    tadalafiL (CIALIS) 20 MG Tab, Take 1 tablet (20 mg total) by mouth every 72 hours as needed (ED)., Disp: 30 tablet, Rfl: 11    tamsulosin (FLOMAX) 0.4 mg Cap, Take 1 capsule (0.4 mg total) by mouth once daily., Disp: 30  "capsule, Rfl: 11    gentamicin (GARAMYCIN) 40 mg/mL injection, , Disp: , Rfl:    Objective:      Vitals:    11/08/22 0845   BP: 108/60   BP Location: Left arm   Patient Position: Sitting   BP Method: Small (Manual)   Pulse: (!) 59   Resp: 18   Temp: 98 °F (36.7 °C)   TempSrc: Oral   SpO2: 96%   Weight: 83.3 kg (183 lb 10.3 oz)   Height: 5' 10" (1.778 m)       Physical Exam  Constitutional:       General: He is not in acute distress.  HENT:      Head: Normocephalic and atraumatic.   Eyes:      Conjunctiva/sclera: Conjunctivae normal.   Cardiovascular:      Rate and Rhythm: Normal rate and regular rhythm.      Heart sounds: Normal heart sounds. No murmur heard.    No friction rub. No gallop.   Pulmonary:      Effort: Pulmonary effort is normal.      Breath sounds: Normal breath sounds. No wheezing or rales.   Musculoskeletal:      Cervical back: Neck supple.   Skin:     General: Skin is warm and dry.      Findings: Rash present.      Comments: Nail abnormality   Neurological:      Mental Status: He is alert and oriented to person, place, and time.   Psychiatric:         Behavior: Behavior normal.         Thought Content: Thought content normal.         Judgment: Judgment normal.          Assessment:       1. Annual physical exam    2. Encounter for colorectal cancer screening    3. Nail abnormality    4. Prostate cancer    5. Prediabetes    6. Abnormal finding of blood chemistry, unspecified    7. Influenza vaccine needed    8. Encounter for abdominal aortic aneurysm (AAA) screening          Plan:       Annual physical exam  -     CBC Auto Differential; Future; Expected date: 11/08/2022  -     Comprehensive Metabolic Panel; Future; Expected date: 11/08/2022  -     Hemoglobin A1C; Future; Expected date: 11/08/2022  -     TSH; Future; Expected date: 11/08/2022  -     Lipid Panel; Future; Expected date: 11/08/2022    Encounter for colorectal cancer screening  -     Ambulatory referral/consult to Endo Procedure ; " Future; Expected date: 11/09/2022    Nail abnormality  -     Ambulatory referral/consult to Dermatology; Future; Expected date: 11/15/2022    Prostate cancer  -     CBC Auto Differential; Future; Expected date: 11/08/2022  -     Comprehensive Metabolic Panel; Future; Expected date: 11/08/2022  -     Hemoglobin A1C; Future; Expected date: 11/08/2022  -     TSH; Future; Expected date: 11/08/2022  -     Lipid Panel; Future; Expected date: 11/08/2022    Prediabetes  -     CBC Auto Differential; Future; Expected date: 11/08/2022  -     Comprehensive Metabolic Panel; Future; Expected date: 11/08/2022  -     Hemoglobin A1C; Future; Expected date: 11/08/2022  -     TSH; Future; Expected date: 11/08/2022  -     Lipid Panel; Future; Expected date: 11/08/2022    Abnormal finding of blood chemistry, unspecified  -     Lipid Panel; Future; Expected date: 11/08/2022    Influenza vaccine needed  -     Influenza (FLUAD) - Quadrivalent (Adjuvanted) *Preferred* (65+) (PF)    Encounter for abdominal aortic aneurysm (AAA) screening  -     US Abdominal Aorta; Future; Expected date: 11/08/2022    F/u labs. Referred to derm. Cont f/u w/ uro for prostate ca        Future Appointments   Date Time Provider Department Center   3/21/2023 10:30 AM LAB, [facility]   3/27/2023 10:45 AM Sha Angel MD [facility]       Patient note was created using [employer].  Any errors in syntax or even information may not have been identified and edited on initial review prior to signing this note.

## 2022-12-07 ENCOUNTER — HOSPITAL ENCOUNTER (OUTPATIENT)
Dept: RADIOLOGY | Facility: HOSPITAL | Age: 67
Discharge: HOME | End: 2022-12-07
Attending: FAMILY MEDICINE
Payer: MEDICARE

## 2022-12-07 DIAGNOSIS — Z13.6 ENCOUNTER FOR ABDOMINAL AORTIC ANEURYSM (AAA) SCREENING: ICD-10-CM

## 2022-12-07 PROCEDURE — 76775 US ABDOMINAL AORTA: ICD-10-PCS | Mod: 26,,, | Performed by: RADIOLOGY

## 2022-12-07 PROCEDURE — 76775 US EXAM ABDO BACK WALL LIM: CPT | Mod: 26,,, | Performed by: RADIOLOGY

## 2022-12-07 PROCEDURE — 76775 US EXAM ABDO BACK WALL LIM: CPT | Mod: TC

## 2022-12-08 ENCOUNTER — TELEPHONE (OUTPATIENT)
Dept: FAMILY MEDICINE | Facility: CLINIC | Age: 67
End: 2022-12-08
Payer: MEDICARE

## 2022-12-08 ENCOUNTER — TELEPHONE (OUTPATIENT)
Dept: FAMILY MEDICINE | Facility: CLINIC | Age: 67
End: 2022-12-08

## 2022-12-08 NOTE — TELEPHONE ENCOUNTER
Left voicemail for patient to return call to clinic for results. Stated that we could not discuss results with her.

## 2022-12-08 NOTE — TELEPHONE ENCOUNTER
----- Message from Geena Arroyo sent at 12/8/2022  1:02 PM CST -----  Type: Patient Call Back    Who called:Vianca/ Spouse     What is the request in detail: Stated gotten a text that said to call for results     Can the clinic reply by [facility]? No     Would the patient rather a call back or a response via [facility]? Call     Best call back number: [phone] (PT) .... (Spouse):[phone]

## 2022-12-08 NOTE — TELEPHONE ENCOUNTER
----- Message from Amy Finn sent at 12/8/2022  4:14 PM CST -----  Regarding: Requesting Test Results  .Type:  Test Results    Who Called: self     Name of Test (Lab/Mammo/Etc):  US     Date of Test:  12/7     Ordering Provider: Joleen     Where the test was performed:  [facility]     Would the patient rather a call back or a response via [facility]? Call     Best Call Back Number:  [phone]      Additional Information:      For Clinical Team:Has the provider reviewed the results?

## 2022-12-09 NOTE — TELEPHONE ENCOUNTER
----- Message from Amy Finn sent at 12/8/2022  4:14 PM CST -----  Regarding: Requesting Test Results  .Type:  Test Results    Who Called: self     Name of Test (Lab/Mammo/Etc):  US     Date of Test:  12/7     Ordering Provider: Joleen     Where the test was performed:  [facility]     Would the patient rather a call back or a response via [facility]? Call     Best Call Back Number:  [phone]      Additional Information:      For Clinical Team:Has the provider reviewed the results?

## 2022-12-14 ENCOUNTER — TELEPHONE (OUTPATIENT)
Dept: ORTHOPEDICS | Facility: CLINIC | Age: 67
End: 2022-12-14

## 2022-12-14 ENCOUNTER — TELEPHONE (OUTPATIENT)
Dept: FAMILY MEDICINE | Facility: CLINIC | Age: 67
End: 2022-12-14
Payer: MEDICARE

## 2022-12-14 NOTE — TELEPHONE ENCOUNTER
----- Message from Irais Fraire sent at 12/14/2022 12:20 PM CST -----  Regarding: Results  Contact: SELENE MADRIGAL [[id number]]  Type:  Test Results    Who Called: Selene Madrigal      Name of Test (Lab/Mammo/Etc): Ultrasound   Date of Test: 12/07/22  Ordering Provider: Dr Goodrich   Where the test was performed: [facility]   Would the patient rather a call back or a response via [facility]? Call back   Best Call Back Number: [phone]// [phone]( spouse number )   Additional Information:

## 2022-12-14 NOTE — TELEPHONE ENCOUNTER
----- Message from Tiffany Rubio sent at 12/14/2022  8:24 AM CST -----  Type: Patient Call Back    Who called:Wife    What is the request in detail:pt would like an update on results    Can the clinic reply by [facility]?    Would the patient rather a call back or a response via [facility]? call    Best call back number:[phone] (home)

## 2022-12-16 NOTE — TELEPHONE ENCOUNTER
----- Message from Lise Sanders sent at 12/16/2022 11:30 AM CST -----  Regarding: Request for Test Results  Type:  Test Results    Who Called: Self     Name of Test (Lab/Mammo/Etc): he said it has a test for his lungs     Date of Test: 12/8/22    Ordering Provider: Joleen     Where the test was performed: [facility]    Would the patient rather a call back or a response via [facility]? Call     Best Call Back Number: [phone]      Additional Information:      For Clinical Team:Has the provider reviewed the results?

## 2023-03-09 DIAGNOSIS — H40.1132 PRIMARY OPEN ANGLE GLAUCOMA (POAG) OF BOTH EYES, MODERATE STAGE: ICD-10-CM

## 2023-03-09 RX ORDER — LATANOPROST 50 UG/ML
1 SOLUTION/ DROPS OPHTHALMIC DAILY
Qty: 7.5 ML | Refills: 90 | Status: SHIPPED | OUTPATIENT
Start: 2023-03-09 | End: 2024-03-08

## 2023-03-09 RX ORDER — LATANOPROST 50 UG/ML
SOLUTION/ DROPS OPHTHALMIC
Qty: 3 ML | Refills: 0 | Status: SHIPPED | OUTPATIENT
Start: 2023-03-09 | End: 2023-03-09 | Stop reason: SDUPTHER

## 2023-03-09 NOTE — TELEPHONE ENCOUNTER
----- Message from Cindy Garcia sent at 3/9/2023  8:33 AM CST -----  Regarding: Refill Request  Contact: [phone]    ----- Message -----  From: Bladimir Ayala  Sent: 3/9/2023   8:31 AM CST  To: Antoinette Bucio Staff    Pt wife is calling and states that her  is out of his latanoprost 0.005 % ophthalmic solution. They were trying to get a refill before his appt tomorrow. Please call back to further assist.       [facility] [address] [place] [address] - [address] [facility]  [address] [facility] [address]  Phone: [phone] Fax: [phone]

## 2023-03-10 ENCOUNTER — OFFICE VISIT (OUTPATIENT)
Dept: OPTOMETRY | Facility: CLINIC | Age: 68
End: 2023-03-10
Payer: MEDICARE

## 2023-03-10 DIAGNOSIS — H25.13 NUCLEAR SCLEROSIS, BILATERAL: ICD-10-CM

## 2023-03-10 DIAGNOSIS — H40.1132 PRIMARY OPEN ANGLE GLAUCOMA (POAG) OF BOTH EYES, MODERATE STAGE: Primary | ICD-10-CM

## 2023-03-10 DIAGNOSIS — H52.223 MYOPIA OF BOTH EYES WITH REGULAR ASTIGMATISM AND PRESBYOPIA: ICD-10-CM

## 2023-03-10 DIAGNOSIS — H52.4 MYOPIA OF BOTH EYES WITH REGULAR ASTIGMATISM AND PRESBYOPIA: ICD-10-CM

## 2023-03-10 DIAGNOSIS — H52.13 MYOPIA OF BOTH EYES WITH REGULAR ASTIGMATISM AND PRESBYOPIA: ICD-10-CM

## 2023-03-10 PROCEDURE — 99999 PR PBB SHADOW E&M-EST. PATIENT-LVL II: CPT | Mod: PBBFAC,HCNC,, | Performed by: OPTOMETRIST

## 2023-03-10 PROCEDURE — 92015 PR REFRACTION: ICD-10-PCS | Mod: HCNC,S$GLB,, | Performed by: OPTOMETRIST

## 2023-03-10 PROCEDURE — 1101F PT FALLS ASSESS-DOCD LE1/YR: CPT | Mod: HCNC,CPTII,S$GLB, | Performed by: OPTOMETRIST

## 2023-03-10 PROCEDURE — 3288F FALL RISK ASSESSMENT DOCD: CPT | Mod: HCNC,CPTII,S$GLB, | Performed by: OPTOMETRIST

## 2023-03-10 PROCEDURE — 1159F MED LIST DOCD IN RCRD: CPT | Mod: HCNC,CPTII,S$GLB, | Performed by: OPTOMETRIST

## 2023-03-10 PROCEDURE — 3288F PR FALLS RISK ASSESSMENT DOCUMENTED: ICD-10-PCS | Mod: HCNC,CPTII,S$GLB, | Performed by: OPTOMETRIST

## 2023-03-10 PROCEDURE — 92015 DETERMINE REFRACTIVE STATE: CPT | Mod: HCNC,S$GLB,, | Performed by: OPTOMETRIST

## 2023-03-10 PROCEDURE — 1159F PR MEDICATION LIST DOCUMENTED IN MEDICAL RECORD: ICD-10-PCS | Mod: HCNC,CPTII,S$GLB, | Performed by: OPTOMETRIST

## 2023-03-10 PROCEDURE — 1101F PR PT FALLS ASSESS DOC 0-1 FALLS W/OUT INJ PAST YR: ICD-10-PCS | Mod: HCNC,CPTII,S$GLB, | Performed by: OPTOMETRIST

## 2023-03-10 PROCEDURE — 99999 PR PBB SHADOW E&M-EST. PATIENT-LVL II: ICD-10-PCS | Mod: PBBFAC,HCNC,, | Performed by: OPTOMETRIST

## 2023-03-10 PROCEDURE — 92133 CPTRZD OPH DX IMG PST SGM ON: CPT | Mod: HCNC,S$GLB,, | Performed by: OPTOMETRIST

## 2023-03-10 PROCEDURE — 92133 OCT, OPTIC NERVE - OU - BOTH EYES: ICD-10-PCS | Mod: HCNC,S$GLB,, | Performed by: OPTOMETRIST

## 2023-03-10 PROCEDURE — 92014 COMPRE OPH EXAM EST PT 1/>: CPT | Mod: HCNC,S$GLB,, | Performed by: OPTOMETRIST

## 2023-03-10 PROCEDURE — 92014 PR EYE EXAM, EST PATIENT,COMPREHESV: ICD-10-PCS | Mod: HCNC,S$GLB,, | Performed by: OPTOMETRIST

## 2023-03-10 NOTE — PROGRESS NOTES
HPI    Annual eye exam    DLS- 07/07/22    67 y.o. is here for a6 mo GLC eval. Latanoprost QHS OU w/ good compliance   Pt states distance vision has change since last eye exam  Pt wear gl all the time  Pt lost gl 4/5 months ago  Pt want to get a new rx for gl    (-)Flashes (-)Floaters  (-)Itch, (-)tear, (-)burn, (+)Dryness.  (+) OTC Drops Systane  (-)Photophobia(-)Glare   (-) Headaches (-) Eye Pain (-) Double vision    Eye Medications: Latanoprost QHS OU     No Past Ocular history     Family Ocular history   Pt states grandmother had glaucoma    Last edited by Fortunato Curry, OD on 3/10/2023  8:19 AM.            Assessment /Plan     For exam results, see Encounter Report.    Primary open angle glaucoma (POAG) of both eyes, moderate stage  -Stable IOP, good complaince  -Latanoprost QHS  -Target mid teens  -HVF 6 mo    Nuclear sclerosis, bilateral  -Educated patient on presence of cataracts at today's exam, monitor at annual dilated fundus exam. 5+ years surgical estimate.    Myopia of both eyes with regular astigmatism and presbyopia  Eyeglass Final Rx       Eyeglass Final Rx         Sphere Cylinder Axis Dist VA Add    Right -2.00 +3.00 125 20/40 +2.50    Left -2.00 +3.00 070 20/25 +2.50      Type: PAL    Expiration Date: 3/10/2024                      RTC 6 mo IOP, HVF

## 2023-03-21 ENCOUNTER — LAB VISIT (OUTPATIENT)
Dept: LAB | Facility: OTHER | Age: 68
End: 2023-03-21
Attending: UROLOGY
Payer: MEDICARE

## 2023-03-21 DIAGNOSIS — C61 PROSTATE CANCER: ICD-10-CM

## 2023-03-21 LAB — COMPLEXED PSA SERPL-MCNC: 14.9 NG/ML (ref 0–4)

## 2023-03-21 PROCEDURE — 36415 COLL VENOUS BLD VENIPUNCTURE: CPT | Mod: HCNC | Performed by: UROLOGY

## 2023-03-21 PROCEDURE — 84153 ASSAY OF PSA TOTAL: CPT | Mod: HCNC | Performed by: UROLOGY

## 2023-03-27 ENCOUNTER — OFFICE VISIT (OUTPATIENT)
Dept: UROLOGY | Facility: CLINIC | Age: 68
End: 2023-03-27
Attending: UROLOGY
Payer: MEDICARE

## 2023-03-27 VITALS
HEART RATE: 62 BPM | WEIGHT: 183 LBS | SYSTOLIC BLOOD PRESSURE: 131 MMHG | DIASTOLIC BLOOD PRESSURE: 79 MMHG | HEIGHT: 70 IN | BODY MASS INDEX: 26.2 KG/M2

## 2023-03-27 DIAGNOSIS — R97.20 ELEVATED PSA: ICD-10-CM

## 2023-03-27 DIAGNOSIS — N52.9 ERECTILE DYSFUNCTION, UNSPECIFIED ERECTILE DYSFUNCTION TYPE: Primary | ICD-10-CM

## 2023-03-27 DIAGNOSIS — C61 PROSTATE CANCER: ICD-10-CM

## 2023-03-27 PROCEDURE — 1159F PR MEDICATION LIST DOCUMENTED IN MEDICAL RECORD: ICD-10-PCS | Mod: HCNC,CPTII,S$GLB, | Performed by: UROLOGY

## 2023-03-27 PROCEDURE — 3288F FALL RISK ASSESSMENT DOCD: CPT | Mod: HCNC,CPTII,S$GLB, | Performed by: UROLOGY

## 2023-03-27 PROCEDURE — 99214 PR OFFICE/OUTPT VISIT, EST, LEVL IV, 30-39 MIN: ICD-10-PCS | Mod: HCNC,S$GLB,, | Performed by: UROLOGY

## 2023-03-27 PROCEDURE — 1160F PR REVIEW ALL MEDS BY PRESCRIBER/CLIN PHARMACIST DOCUMENTED: ICD-10-PCS | Mod: HCNC,CPTII,S$GLB, | Performed by: UROLOGY

## 2023-03-27 PROCEDURE — 1160F RVW MEDS BY RX/DR IN RCRD: CPT | Mod: HCNC,CPTII,S$GLB, | Performed by: UROLOGY

## 2023-03-27 PROCEDURE — 1101F PR PT FALLS ASSESS DOC 0-1 FALLS W/OUT INJ PAST YR: ICD-10-PCS | Mod: HCNC,CPTII,S$GLB, | Performed by: UROLOGY

## 2023-03-27 PROCEDURE — 3008F PR BODY MASS INDEX (BMI) DOCUMENTED: ICD-10-PCS | Mod: HCNC,CPTII,S$GLB, | Performed by: UROLOGY

## 2023-03-27 PROCEDURE — 3075F SYST BP GE 130 - 139MM HG: CPT | Mod: HCNC,CPTII,S$GLB, | Performed by: UROLOGY

## 2023-03-27 PROCEDURE — 3078F DIAST BP <80 MM HG: CPT | Mod: HCNC,CPTII,S$GLB, | Performed by: UROLOGY

## 2023-03-27 PROCEDURE — 3008F BODY MASS INDEX DOCD: CPT | Mod: HCNC,CPTII,S$GLB, | Performed by: UROLOGY

## 2023-03-27 PROCEDURE — 1101F PT FALLS ASSESS-DOCD LE1/YR: CPT | Mod: HCNC,CPTII,S$GLB, | Performed by: UROLOGY

## 2023-03-27 PROCEDURE — 3288F PR FALLS RISK ASSESSMENT DOCUMENTED: ICD-10-PCS | Mod: HCNC,CPTII,S$GLB, | Performed by: UROLOGY

## 2023-03-27 PROCEDURE — 99214 OFFICE O/P EST MOD 30 MIN: CPT | Mod: HCNC,S$GLB,, | Performed by: UROLOGY

## 2023-03-27 PROCEDURE — 1126F PR PAIN SEVERITY QUANTIFIED, NO PAIN PRESENT: ICD-10-PCS | Mod: HCNC,CPTII,S$GLB, | Performed by: UROLOGY

## 2023-03-27 PROCEDURE — 1159F MED LIST DOCD IN RCRD: CPT | Mod: HCNC,CPTII,S$GLB, | Performed by: UROLOGY

## 2023-03-27 PROCEDURE — 3078F PR MOST RECENT DIASTOLIC BLOOD PRESSURE < 80 MM HG: ICD-10-PCS | Mod: HCNC,CPTII,S$GLB, | Performed by: UROLOGY

## 2023-03-27 PROCEDURE — 3075F PR MOST RECENT SYSTOLIC BLOOD PRESS GE 130-139MM HG: ICD-10-PCS | Mod: HCNC,CPTII,S$GLB, | Performed by: UROLOGY

## 2023-03-27 PROCEDURE — 1126F AMNT PAIN NOTED NONE PRSNT: CPT | Mod: HCNC,CPTII,S$GLB, | Performed by: UROLOGY

## 2023-03-27 RX ORDER — TADALAFIL 20 MG/1
20 TABLET ORAL
Qty: 30 TABLET | Refills: 11 | Status: SHIPPED | OUTPATIENT
Start: 2023-03-27 | End: 2023-12-08 | Stop reason: SDUPTHER

## 2023-03-27 NOTE — PROGRESS NOTES
"Subjective:      Herbie Naqvi is a 67 y.o. y.o. male who returns today regarding his prostate cancer.    He is s/p TRUS/biopsy on 2/14/22 that demonstrated low risk prostate cancer ([place] 3+3=6 in 1 core).  Opted for AS. Here for FU.    PSA 14.9 from 9.0.    The following portions of the patient's history were reviewed and updated as appropriate: allergies, current medications, past family history, past medical history, past social history, past surgical history and problem list.       Objective:   Vitals: /79   Pulse 62   Ht 5' 10" (1.778 m)   Wt 83 kg (183 lb)   BMI 26.26 kg/m²      Physical Exam   General: alert and oriented, no acute distress  Respiratory: Symmetric expansion, non-labored breathing  Neuro: no gross deficits  Psych: normal judgment and insight, normal mood/affect and non-anxious    Lab Review     Lab Results   Component Value Date    PSA 6.9 12/30/21       Pathology  Prostate Biopsy: [place] 3+3=6 in 1 core, only 4% of core    Assessment and Plan:   Prostate Cancer  -- pMRI ordered today. Will schedule for repeat biopsy with [facility] if concerning lesions found. If not repeat confirmatory TRUS biopsy    "

## 2023-04-18 DIAGNOSIS — S83.231S COMPLEX TEAR OF MEDIAL MENISCUS OF RIGHT KNEE AS CURRENT INJURY, SEQUELA: ICD-10-CM

## 2023-04-18 DIAGNOSIS — M17.11 PRIMARY OSTEOARTHRITIS OF RIGHT KNEE: ICD-10-CM

## 2023-04-18 RX ORDER — MELOXICAM 15 MG/1
15 TABLET ORAL DAILY
Qty: 90 TABLET | Refills: 3 | Status: SHIPPED | OUTPATIENT
Start: 2023-04-18

## 2023-04-28 ENCOUNTER — HOSPITAL ENCOUNTER (OUTPATIENT)
Dept: RADIOLOGY | Facility: HOSPITAL | Age: 68
Discharge: HOME | End: 2023-04-28
Attending: UROLOGY
Payer: MEDICARE

## 2023-04-28 DIAGNOSIS — C61 PROSTATE CANCER: ICD-10-CM

## 2023-04-28 DIAGNOSIS — R97.20 ELEVATED PSA: ICD-10-CM

## 2023-04-28 PROCEDURE — 72197 MRI PELVIS W/O & W/DYE: CPT | Mod: 26,HCNC,, | Performed by: RADIOLOGY

## 2023-04-28 PROCEDURE — 72197 MRI PELVIS W/O & W/DYE: CPT | Mod: TC,HCNC

## 2023-04-28 PROCEDURE — 25500020 PHARM REV CODE 255: Mod: HCNC | Performed by: UROLOGY

## 2023-04-28 PROCEDURE — 72197 MRI PROSTATE W W/O CONTRAST: ICD-10-PCS | Mod: 26,HCNC,, | Performed by: RADIOLOGY

## 2023-04-28 PROCEDURE — A9585 GADOBUTROL INJECTION: HCPCS | Mod: HCNC | Performed by: UROLOGY

## 2023-04-28 RX ORDER — GADOBUTROL 604.72 MG/ML
10 INJECTION INTRAVENOUS
Status: COMPLETED | OUTPATIENT
Start: 2023-04-28 | End: 2023-04-28

## 2023-04-28 RX ADMIN — GADOBUTROL 10 ML: 604.72 INJECTION INTRAVENOUS at 02:04

## 2023-05-04 ENCOUNTER — OFFICE VISIT (OUTPATIENT)
Dept: UROLOGY | Facility: CLINIC | Age: 68
End: 2023-05-04
Attending: UROLOGY
Payer: MEDICARE

## 2023-05-04 VITALS
WEIGHT: 184.44 LBS | SYSTOLIC BLOOD PRESSURE: 125 MMHG | DIASTOLIC BLOOD PRESSURE: 82 MMHG | HEART RATE: 69 BPM | BODY MASS INDEX: 26.46 KG/M2

## 2023-05-04 DIAGNOSIS — C61 PROSTATE CANCER: Primary | ICD-10-CM

## 2023-05-04 PROCEDURE — 99214 PR OFFICE/OUTPT VISIT, EST, LEVL IV, 30-39 MIN: ICD-10-PCS | Mod: HCNC,S$GLB,, | Performed by: UROLOGY

## 2023-05-04 PROCEDURE — 3074F PR MOST RECENT SYSTOLIC BLOOD PRESSURE < 130 MM HG: ICD-10-PCS | Mod: HCNC,CPTII,S$GLB, | Performed by: UROLOGY

## 2023-05-04 PROCEDURE — 99214 OFFICE O/P EST MOD 30 MIN: CPT | Mod: HCNC,S$GLB,, | Performed by: UROLOGY

## 2023-05-04 PROCEDURE — 1160F PR REVIEW ALL MEDS BY PRESCRIBER/CLIN PHARMACIST DOCUMENTED: ICD-10-PCS | Mod: HCNC,CPTII,S$GLB, | Performed by: UROLOGY

## 2023-05-04 PROCEDURE — 3008F BODY MASS INDEX DOCD: CPT | Mod: HCNC,CPTII,S$GLB, | Performed by: UROLOGY

## 2023-05-04 PROCEDURE — 3079F DIAST BP 80-89 MM HG: CPT | Mod: HCNC,CPTII,S$GLB, | Performed by: UROLOGY

## 2023-05-04 PROCEDURE — 1160F RVW MEDS BY RX/DR IN RCRD: CPT | Mod: HCNC,CPTII,S$GLB, | Performed by: UROLOGY

## 2023-05-04 PROCEDURE — 1159F MED LIST DOCD IN RCRD: CPT | Mod: HCNC,CPTII,S$GLB, | Performed by: UROLOGY

## 2023-05-04 PROCEDURE — 1101F PT FALLS ASSESS-DOCD LE1/YR: CPT | Mod: HCNC,CPTII,S$GLB, | Performed by: UROLOGY

## 2023-05-04 PROCEDURE — 1126F PR PAIN SEVERITY QUANTIFIED, NO PAIN PRESENT: ICD-10-PCS | Mod: HCNC,CPTII,S$GLB, | Performed by: UROLOGY

## 2023-05-04 PROCEDURE — 3074F SYST BP LT 130 MM HG: CPT | Mod: HCNC,CPTII,S$GLB, | Performed by: UROLOGY

## 2023-05-04 PROCEDURE — 1159F PR MEDICATION LIST DOCUMENTED IN MEDICAL RECORD: ICD-10-PCS | Mod: HCNC,CPTII,S$GLB, | Performed by: UROLOGY

## 2023-05-04 PROCEDURE — 3288F FALL RISK ASSESSMENT DOCD: CPT | Mod: HCNC,CPTII,S$GLB, | Performed by: UROLOGY

## 2023-05-04 PROCEDURE — 3079F PR MOST RECENT DIASTOLIC BLOOD PRESSURE 80-89 MM HG: ICD-10-PCS | Mod: HCNC,CPTII,S$GLB, | Performed by: UROLOGY

## 2023-05-04 PROCEDURE — 3288F PR FALLS RISK ASSESSMENT DOCUMENTED: ICD-10-PCS | Mod: HCNC,CPTII,S$GLB, | Performed by: UROLOGY

## 2023-05-04 PROCEDURE — 1126F AMNT PAIN NOTED NONE PRSNT: CPT | Mod: HCNC,CPTII,S$GLB, | Performed by: UROLOGY

## 2023-05-04 PROCEDURE — 1101F PR PT FALLS ASSESS DOC 0-1 FALLS W/OUT INJ PAST YR: ICD-10-PCS | Mod: HCNC,CPTII,S$GLB, | Performed by: UROLOGY

## 2023-05-04 PROCEDURE — 3008F PR BODY MASS INDEX (BMI) DOCUMENTED: ICD-10-PCS | Mod: HCNC,CPTII,S$GLB, | Performed by: UROLOGY

## 2023-05-04 RX ORDER — CIPROFLOXACIN 500 MG/1
500 TABLET ORAL 2 TIMES DAILY
Qty: 4 TABLET | Refills: 0 | Status: SHIPPED | OUTPATIENT
Start: 2023-05-04 | End: 2023-05-06

## 2023-05-04 NOTE — PROGRESS NOTES
Subjective:      Herbie Naqvi is a 67 y.o. y.o. male who returns today regarding his prostate cancer.    He is s/p TRUS/biopsy on 2/14/22 that demonstrated low risk prostate cancer ([place] 3+3=6 in 1 core).  Opted for AS. Here for FU. No c/o today.    PSA increased to 14.9 last visit. Here today to review MRI and schedule biopsy.    The following portions of the patient's history were reviewed and updated as appropriate: allergies, current medications, past family history, past medical history, past social history, past surgical history and problem list.       Objective:   Vitals: /82   Pulse 69   Wt 83.7 kg (184 lb 6.6 oz)   BMI 26.46 kg/m²      Physical Exam   General: alert and oriented, no acute distress  Respiratory: Symmetric expansion, non-labored breathing  Neuro: no gross deficits  Psych: normal judgment and insight, normal mood/affect and non-anxious    Lab Review   Component      Latest Ref Rng & Units 3/21/2023   PSA Diagnostic      0.00 - 4.00 ng/mL 14.9 (H)     Imaging  MRI Prostate:   No lesions; PIRADS 2    Pathology  Prostate Biopsy: [place] 3+3=6 in 1 core, only 4% of core    Assessment and Plan:   Prostate Cancer  -- Will schedule systematic biopsy in office for confirmation since he is on AS

## 2023-05-22 DIAGNOSIS — R97.20 ELEVATED PSA: Primary | ICD-10-CM

## 2023-05-22 NOTE — PROGRESS NOTES
Patient did not  Cipro prior to scheduled TRUS with prostate biopsy. Will reschedule at this time for 6/8/23.    New order placed and original cancelled. Did not send new Cipro order to pharmacy. Patient instructed in person to  at [facility].

## 2023-06-08 ENCOUNTER — PROCEDURE VISIT (OUTPATIENT)
Dept: UROLOGY | Facility: CLINIC | Age: 68
End: 2023-06-08
Attending: UROLOGY
Payer: MEDICARE

## 2023-06-08 VITALS
WEIGHT: 187.81 LBS | RESPIRATION RATE: 20 BRPM | HEART RATE: 76 BPM | BODY MASS INDEX: 26.95 KG/M2 | SYSTOLIC BLOOD PRESSURE: 138 MMHG | OXYGEN SATURATION: 96 % | DIASTOLIC BLOOD PRESSURE: 88 MMHG

## 2023-06-08 DIAGNOSIS — R97.20 ELEVATED PSA: ICD-10-CM

## 2023-06-08 PROCEDURE — 88344 PR IHC OR ICC EACH MULTIPLEX ANTIBODY STAIN PROCEDURE: ICD-10-PCS | Mod: 26,HCNC,, | Performed by: PATHOLOGY

## 2023-06-08 PROCEDURE — 88344 IMHCHEM/IMCYTCHM EA MLT ANTB: CPT | Mod: 59 | Performed by: PATHOLOGY

## 2023-06-08 PROCEDURE — 76872 US TRANSRECTAL: CPT | Mod: 26,HCNC,S$GLB, | Performed by: UROLOGY

## 2023-06-08 PROCEDURE — 88305 TISSUE EXAM BY PATHOLOGIST: CPT | Mod: 59,HCNC | Performed by: PATHOLOGY

## 2023-06-08 PROCEDURE — 88305 TISSUE EXAM BY PATHOLOGIST: CPT | Mod: 26,HCNC,, | Performed by: PATHOLOGY

## 2023-06-08 PROCEDURE — 55700 TRANSRECTAL ULTRASOUND W/ BIOPSY: ICD-10-PCS | Mod: HCNC,S$GLB,, | Performed by: UROLOGY

## 2023-06-08 PROCEDURE — 55700 TRANSRECTAL ULTRASOUND W/ BIOPSY: CPT | Mod: HCNC,S$GLB,, | Performed by: UROLOGY

## 2023-06-08 PROCEDURE — 96372 THER/PROPH/DIAG INJ SC/IM: CPT | Mod: HCNC,59,S$GLB, | Performed by: UROLOGY

## 2023-06-08 PROCEDURE — 96372 PR INJECTION,THERAP/PROPH/DIAG2ST, IM OR SUBCUT: ICD-10-PCS | Mod: HCNC,59,S$GLB, | Performed by: UROLOGY

## 2023-06-08 PROCEDURE — 88344 IMHCHEM/IMCYTCHM EA MLT ANTB: CPT | Mod: 26,HCNC,, | Performed by: PATHOLOGY

## 2023-06-08 PROCEDURE — 76872 TRANSRECTAL ULTRASOUND W/ BIOPSY: ICD-10-PCS | Mod: 26,HCNC,S$GLB, | Performed by: UROLOGY

## 2023-06-08 PROCEDURE — 88305 TISSUE EXAM BY PATHOLOGIST: ICD-10-PCS | Mod: 26,HCNC,, | Performed by: PATHOLOGY

## 2023-06-08 RX ORDER — LIDOCAINE HYDROCHLORIDE 10 MG/ML
5 INJECTION, SOLUTION EPIDURAL; INFILTRATION; INTRACAUDAL; PERINEURAL
Status: SHIPPED | OUTPATIENT
Start: 2023-06-08

## 2023-06-08 RX ORDER — LIDOCAINE HYDROCHLORIDE 20 MG/ML
JELLY TOPICAL
Status: COMPLETED | OUTPATIENT
Start: 2023-06-08 | End: 2023-06-08

## 2023-06-08 RX ORDER — GENTAMICIN SULFATE 40 MG/ML
2.5 INJECTION, SOLUTION INTRAMUSCULAR; INTRAVENOUS
Status: COMPLETED | OUTPATIENT
Start: 2023-06-08 | End: 2023-06-08

## 2023-06-08 RX ADMIN — GENTAMICIN SULFATE 213 MG: 40 INJECTION, SOLUTION INTRAMUSCULAR; INTRAVENOUS at 10:06

## 2023-06-08 RX ADMIN — LIDOCAINE HYDROCHLORIDE: 20 JELLY TOPICAL at 10:06

## 2023-06-08 NOTE — PROCEDURES
Transrectal Ultrasound w/ Biopsy    Date/Time: 6/8/2023 10:00 AM  Performed by: Sha Angel MD  Authorized by: Vishal Dow MD     Consent Done?:  Yes (Written)  Preparation: Patient was prepped and draped in usual sterile fashion    Position:  Left lateral  Anesthesia:  10cc's 1% Lidocaine  Patient sedated: No    Prostate Size:  44.2 cm^3  Left Base Biopsies: 2  Left Mid Biopsies: 2  Left [place] Biopsies: 2  Right Base Biopsies: 2  Right Mid Biopsies: 2  Right [place] Biopsies: 2  Transitional zone: No    Total Biopsies:  12    Patient tolerance:  Patient tolerated the procedure well with no immediate complications    Will message w/ results

## 2023-06-16 LAB
FINAL PATHOLOGIC DIAGNOSIS: NORMAL
Lab: NORMAL

## 2023-08-14 ENCOUNTER — PES CALL (OUTPATIENT)
Dept: ADMINISTRATIVE | Facility: CLINIC | Age: 68
End: 2023-08-14
Payer: MEDICARE

## 2023-08-23 ENCOUNTER — TELEPHONE (OUTPATIENT)
Dept: UROLOGY | Facility: CLINIC | Age: 68
End: 2023-08-23
Payer: MEDICARE

## 2023-08-23 NOTE — TELEPHONE ENCOUNTER
----- Message from Sha Angel MD sent at 8/22/2023  1:22 PM CDT -----  This patient never had FU after his biopsy in June. Please call him to schedule. Thanks.

## 2023-10-12 ENCOUNTER — TELEPHONE (OUTPATIENT)
Dept: UROLOGY | Facility: CLINIC | Age: 68
End: 2023-10-12
Payer: MEDICARE

## 2023-10-20 ENCOUNTER — TELEPHONE (OUTPATIENT)
Dept: UROLOGY | Facility: CLINIC | Age: 68
End: 2023-10-20
Payer: MEDICARE

## 2023-10-20 NOTE — TELEPHONE ENCOUNTER
Spoke with pt to let him know he should reach out to his PCP or GI Provider. Pt stated that he would give them a call.

## 2023-10-24 ENCOUNTER — TELEPHONE (OUTPATIENT)
Dept: FAMILY MEDICINE | Facility: CLINIC | Age: 68
End: 2023-10-24

## 2023-10-24 NOTE — TELEPHONE ENCOUNTER
----- Message from Dru Curry sent at 10/24/2023 11:24 AM CDT -----  Regarding: Wife [phone]  Type: Patient Call Back    Who called: Wife     What is the request in detail: called to talk to the office about a problem the pt is having and would like a call to see what the pt should do.     Can the clinic reply by [facility]? No     Would the patient rather a call back or a response via [facility]? Call back     Best call back number: [phone]    Additional Information:    Thank you.

## 2023-10-24 NOTE — TELEPHONE ENCOUNTER
Patient's wife scheduled appointment for 10/27/2023 for rectum leakage.  Stated she tried scheduling an appointment with a specialist, but was advised that patient needs to be seen by his PCP first.  Please be advised.

## 2023-10-26 RX ORDER — CIPROFLOXACIN 500 MG/1
500 TABLET ORAL 2 TIMES DAILY
COMMUNITY
Start: 2023-05-22 | End: 2023-12-08 | Stop reason: CLARIF

## 2023-10-26 NOTE — PROGRESS NOTES
Health Maintenance Due   Topic     Pneumococcal Vaccines (Age 65+) (1 - PCV) Pt agree to get today    Shingles Vaccine (1 of 2) Hx of chickenpox. Notified pt can get vaccine at pharmacy.    Colorectal Cancer Screening  Information received.      Influenza Vaccine (1) Pt agree to get today    COVID-19 Vaccine (5 - 2023-24 season) Not offered at this Facility

## 2023-10-27 ENCOUNTER — OFFICE VISIT (OUTPATIENT)
Dept: FAMILY MEDICINE | Facility: CLINIC | Age: 68
End: 2023-10-27
Payer: MEDICARE

## 2023-10-27 ENCOUNTER — LAB VISIT (OUTPATIENT)
Dept: LAB | Facility: HOSPITAL | Age: 68
End: 2023-10-27
Attending: FAMILY MEDICINE
Payer: MEDICARE

## 2023-10-27 VITALS
BODY MASS INDEX: 26.55 KG/M2 | RESPIRATION RATE: 18 BRPM | TEMPERATURE: 98 F | SYSTOLIC BLOOD PRESSURE: 122 MMHG | HEIGHT: 70 IN | WEIGHT: 185.44 LBS | HEART RATE: 70 BPM | OXYGEN SATURATION: 95 % | DIASTOLIC BLOOD PRESSURE: 84 MMHG

## 2023-10-27 DIAGNOSIS — D70.9 NEUTROPENIA, UNSPECIFIED TYPE: ICD-10-CM

## 2023-10-27 DIAGNOSIS — C61 PROSTATE CANCER: ICD-10-CM

## 2023-10-27 DIAGNOSIS — R79.9 ABNORMAL FINDING OF BLOOD CHEMISTRY, UNSPECIFIED: ICD-10-CM

## 2023-10-27 DIAGNOSIS — R73.03 PREDIABETES: ICD-10-CM

## 2023-10-27 DIAGNOSIS — D64.9 ANEMIA, UNSPECIFIED TYPE: ICD-10-CM

## 2023-10-27 DIAGNOSIS — Z12.11 ENCOUNTER FOR COLORECTAL CANCER SCREENING: ICD-10-CM

## 2023-10-27 DIAGNOSIS — Z23 NEED FOR PNEUMOCOCCAL VACCINE: ICD-10-CM

## 2023-10-27 DIAGNOSIS — Z00.00 ANNUAL PHYSICAL EXAM: Primary | ICD-10-CM

## 2023-10-27 DIAGNOSIS — Z23 NEEDS FLU SHOT: ICD-10-CM

## 2023-10-27 DIAGNOSIS — R15.9 INCONTINENCE OF FECES, UNSPECIFIED FECAL INCONTINENCE TYPE: ICD-10-CM

## 2023-10-27 DIAGNOSIS — Z12.12 ENCOUNTER FOR COLORECTAL CANCER SCREENING: ICD-10-CM

## 2023-10-27 LAB
ALBUMIN SERPL BCP-MCNC: 4.1 G/DL (ref 3.5–5.2)
ALP SERPL-CCNC: 72 U/L (ref 55–135)
ALT SERPL W/O P-5'-P-CCNC: 31 U/L (ref 10–44)
ANION GAP SERPL CALC-SCNC: 7 MMOL/L (ref 8–16)
AST SERPL-CCNC: 26 U/L (ref 10–40)
BASOPHILS # BLD AUTO: 0.06 K/UL (ref 0–0.2)
BASOPHILS NFR BLD: 1.6 % (ref 0–1.9)
BILIRUB SERPL-MCNC: 0.2 MG/DL (ref 0.1–1)
BUN SERPL-MCNC: 12 MG/DL (ref 8–23)
CALCIUM SERPL-MCNC: 9.5 MG/DL (ref 8.7–10.5)
CHLORIDE SERPL-SCNC: 107 MMOL/L (ref 95–110)
CHOLEST SERPL-MCNC: 170 MG/DL (ref 120–199)
CHOLEST/HDLC SERPL: 3.3 {RATIO} (ref 2–5)
CO2 SERPL-SCNC: 26 MMOL/L (ref 23–29)
CREAT SERPL-MCNC: 0.8 MG/DL (ref 0.5–1.4)
DIFFERENTIAL METHOD: ABNORMAL
EOSINOPHIL # BLD AUTO: 0.1 K/UL (ref 0–0.5)
EOSINOPHIL NFR BLD: 2.6 % (ref 0–8)
ERYTHROCYTE [DISTWIDTH] IN BLOOD BY AUTOMATED COUNT: 14.6 % (ref 11.5–14.5)
EST. GFR  (NO RACE VARIABLE): >60 ML/MIN/1.73 M^2
ESTIMATED AVG GLUCOSE: 120 MG/DL (ref 68–131)
GLUCOSE SERPL-MCNC: 102 MG/DL (ref 70–110)
HBA1C MFR BLD: 5.8 % (ref 4–5.6)
HCT VFR BLD AUTO: 41.2 % (ref 40–54)
HDLC SERPL-MCNC: 51 MG/DL (ref 40–75)
HDLC SERPL: 30 % (ref 20–50)
HGB BLD-MCNC: 13.7 G/DL (ref 14–18)
IMM GRANULOCYTES # BLD AUTO: 0 K/UL (ref 0–0.04)
IMM GRANULOCYTES NFR BLD AUTO: 0 % (ref 0–0.5)
LDLC SERPL CALC-MCNC: 99.8 MG/DL (ref 63–159)
LYMPHOCYTES # BLD AUTO: 1.8 K/UL (ref 1–4.8)
LYMPHOCYTES NFR BLD: 47.5 % (ref 18–48)
MCH RBC QN AUTO: 30.2 PG (ref 27–31)
MCHC RBC AUTO-ENTMCNC: 33.3 G/DL (ref 32–36)
MCV RBC AUTO: 91 FL (ref 82–98)
MONOCYTES # BLD AUTO: 0.4 K/UL (ref 0.3–1)
MONOCYTES NFR BLD: 10.4 % (ref 4–15)
NEUTROPHILS # BLD AUTO: 1.5 K/UL (ref 1.8–7.7)
NEUTROPHILS NFR BLD: 37.9 % (ref 38–73)
NONHDLC SERPL-MCNC: 119 MG/DL
NRBC BLD-RTO: 0 /100 WBC
PLATELET # BLD AUTO: 226 K/UL (ref 150–450)
PMV BLD AUTO: 10.1 FL (ref 9.2–12.9)
POTASSIUM SERPL-SCNC: 4.2 MMOL/L (ref 3.5–5.1)
PROT SERPL-MCNC: 6.8 G/DL (ref 6–8.4)
RBC # BLD AUTO: 4.53 M/UL (ref 4.6–6.2)
SODIUM SERPL-SCNC: 140 MMOL/L (ref 136–145)
TRIGL SERPL-MCNC: 96 MG/DL (ref 30–150)
WBC # BLD AUTO: 3.83 K/UL (ref 3.9–12.7)

## 2023-10-27 PROCEDURE — 3288F FALL RISK ASSESSMENT DOCD: CPT | Mod: HCNC,CPTII,S$GLB, | Performed by: FAMILY MEDICINE

## 2023-10-27 PROCEDURE — 1159F MED LIST DOCD IN RCRD: CPT | Mod: HCNC,CPTII,S$GLB, | Performed by: FAMILY MEDICINE

## 2023-10-27 PROCEDURE — 99999 PR PBB SHADOW E&M-EST. PATIENT-LVL IV: ICD-10-PCS | Mod: PBBFAC,HCNC,, | Performed by: FAMILY MEDICINE

## 2023-10-27 PROCEDURE — G0008 FLU VACCINE - QUADRIVALENT - ADJUVANTED: ICD-10-PCS | Mod: HCNC,S$GLB,, | Performed by: FAMILY MEDICINE

## 2023-10-27 PROCEDURE — 3079F DIAST BP 80-89 MM HG: CPT | Mod: HCNC,CPTII,S$GLB, | Performed by: FAMILY MEDICINE

## 2023-10-27 PROCEDURE — 36415 COLL VENOUS BLD VENIPUNCTURE: CPT | Mod: HCNC,PO | Performed by: FAMILY MEDICINE

## 2023-10-27 PROCEDURE — G0009 ADMIN PNEUMOCOCCAL VACCINE: HCPCS | Mod: HCNC,S$GLB,, | Performed by: FAMILY MEDICINE

## 2023-10-27 PROCEDURE — 1101F PR PT FALLS ASSESS DOC 0-1 FALLS W/OUT INJ PAST YR: ICD-10-PCS | Mod: HCNC,CPTII,S$GLB, | Performed by: FAMILY MEDICINE

## 2023-10-27 PROCEDURE — 99397 PR PREVENTIVE VISIT,EST,65 & OVER: ICD-10-PCS | Mod: HCNC,S$GLB,, | Performed by: FAMILY MEDICINE

## 2023-10-27 PROCEDURE — 85025 COMPLETE CBC W/AUTO DIFF WBC: CPT | Mod: HCNC | Performed by: FAMILY MEDICINE

## 2023-10-27 PROCEDURE — 90677 PCV20 VACCINE IM: CPT | Mod: HCNC,S$GLB,, | Performed by: FAMILY MEDICINE

## 2023-10-27 PROCEDURE — 90677 PNEUMOCOCCAL CONJUGATE VACCINE 20-VALENT: ICD-10-PCS | Mod: HCNC,S$GLB,, | Performed by: FAMILY MEDICINE

## 2023-10-27 PROCEDURE — 3079F PR MOST RECENT DIASTOLIC BLOOD PRESSURE 80-89 MM HG: ICD-10-PCS | Mod: HCNC,CPTII,S$GLB, | Performed by: FAMILY MEDICINE

## 2023-10-27 PROCEDURE — G0009 PNEUMOCOCCAL CONJUGATE VACCINE 20-VALENT: ICD-10-PCS | Mod: HCNC,S$GLB,, | Performed by: FAMILY MEDICINE

## 2023-10-27 PROCEDURE — 3008F BODY MASS INDEX DOCD: CPT | Mod: HCNC,CPTII,S$GLB, | Performed by: FAMILY MEDICINE

## 2023-10-27 PROCEDURE — 1126F AMNT PAIN NOTED NONE PRSNT: CPT | Mod: HCNC,CPTII,S$GLB, | Performed by: FAMILY MEDICINE

## 2023-10-27 PROCEDURE — 83036 HEMOGLOBIN GLYCOSYLATED A1C: CPT | Mod: HCNC | Performed by: FAMILY MEDICINE

## 2023-10-27 PROCEDURE — 90694 VACC AIIV4 NO PRSRV 0.5ML IM: CPT | Mod: HCNC,S$GLB,, | Performed by: FAMILY MEDICINE

## 2023-10-27 PROCEDURE — 1159F PR MEDICATION LIST DOCUMENTED IN MEDICAL RECORD: ICD-10-PCS | Mod: HCNC,CPTII,S$GLB, | Performed by: FAMILY MEDICINE

## 2023-10-27 PROCEDURE — 1101F PT FALLS ASSESS-DOCD LE1/YR: CPT | Mod: HCNC,CPTII,S$GLB, | Performed by: FAMILY MEDICINE

## 2023-10-27 PROCEDURE — 80061 LIPID PANEL: CPT | Mod: HCNC | Performed by: FAMILY MEDICINE

## 2023-10-27 PROCEDURE — 3008F PR BODY MASS INDEX (BMI) DOCUMENTED: ICD-10-PCS | Mod: HCNC,CPTII,S$GLB, | Performed by: FAMILY MEDICINE

## 2023-10-27 PROCEDURE — 3288F PR FALLS RISK ASSESSMENT DOCUMENTED: ICD-10-PCS | Mod: HCNC,CPTII,S$GLB, | Performed by: FAMILY MEDICINE

## 2023-10-27 PROCEDURE — G0008 ADMIN INFLUENZA VIRUS VAC: HCPCS | Mod: HCNC,S$GLB,, | Performed by: FAMILY MEDICINE

## 2023-10-27 PROCEDURE — 99397 PER PM REEVAL EST PAT 65+ YR: CPT | Mod: HCNC,S$GLB,, | Performed by: FAMILY MEDICINE

## 2023-10-27 PROCEDURE — 3074F PR MOST RECENT SYSTOLIC BLOOD PRESSURE < 130 MM HG: ICD-10-PCS | Mod: HCNC,CPTII,S$GLB, | Performed by: FAMILY MEDICINE

## 2023-10-27 PROCEDURE — 80053 COMPREHEN METABOLIC PANEL: CPT | Mod: HCNC | Performed by: FAMILY MEDICINE

## 2023-10-27 PROCEDURE — 3074F SYST BP LT 130 MM HG: CPT | Mod: HCNC,CPTII,S$GLB, | Performed by: FAMILY MEDICINE

## 2023-10-27 PROCEDURE — 99999 PR PBB SHADOW E&M-EST. PATIENT-LVL IV: CPT | Mod: PBBFAC,HCNC,, | Performed by: FAMILY MEDICINE

## 2023-10-27 PROCEDURE — 1126F PR PAIN SEVERITY QUANTIFIED, NO PAIN PRESENT: ICD-10-PCS | Mod: HCNC,CPTII,S$GLB, | Performed by: FAMILY MEDICINE

## 2023-10-27 PROCEDURE — 90694 FLU VACCINE - QUADRIVALENT - ADJUVANTED: ICD-10-PCS | Mod: HCNC,S$GLB,, | Performed by: FAMILY MEDICINE

## 2023-10-27 NOTE — Clinical Note
Hey guys this patient had a biopsy and was found to have prostate cancer about 4 months ago. I see you john tried to set up an appointment for him a few times but was unable to reach him. He did have an appointment about 3 days ago but seems like that was canceled and the patient was to follow-up with me for anal leakage that started after his prostate biopsy.  I will have the patient reach out to your clinic to schedule an appointment but I was hoping you carmelanat could get him in quicker.  Thank you, Jake

## 2023-10-27 NOTE — PROGRESS NOTES
Subjective:       Patient ID: Herbie Naqvi is a 68 y.o. male.    Chief Complaint: Anal Leaking (Clear) and Annual Exam      HPI  68-year-old male presents for annual exam.  Patient states since his prostate biopsy he has been having some clear leakage out of his anus.  Patient does not feel when his coming but feels is unaware if soiled.  Denies any feces or blood.  Biopsy showed he had prostate cancer and has not had a follow-up with Urology.    Review of Systems   Constitutional: Negative.    HENT: Negative.     Respiratory: Negative.     Cardiovascular: Negative.    Gastrointestinal: Negative.    Endocrine: Negative.    Genitourinary: Negative.    Musculoskeletal: Negative.    Neurological: Negative.    Psychiatric/Behavioral: Negative.            History reviewed. No pertinent past medical history.  Past Surgical History:   Procedure Laterality Date    COLONOSCOPY N/A 11/30/2017    Procedure: COLONOSCOPY;  Surgeon: John Roman MD;  Location: [facility];  Service: Endoscopy;  Laterality: N/A;  colonoscopy confirmed    wrist broke       rt side wrist     Family History   Problem Relation Age of Onset    Diabetes Mother     Heart disease Father     Hyperlipidemia Father      Social History     Socioeconomic History    Marital status:    Tobacco Use    Smoking status: Former     Types: Cigarettes     Start date: 3/25/2014    Smokeless tobacco: Never   Substance and Sexual Activity    Alcohol use: No     Comment: former drinker social stop drinking    Drug use: No    Sexual activity: Yes     Partners: Female     Birth control/protection: None     Comment:        Current Outpatient Medications:     latanoprost 0.005 % ophthalmic solution, Place 1 drop into both eyes once daily., Disp: 7.5 mL, Rfl: 90    meloxicam (MOBIC) 15 MG tablet, Take 1 tablet (15 mg total) by mouth once daily., Disp: 90 tablet, Rfl: 3    tadalafiL (CIALIS) 20 MG Tab, Take 1 tablet (20 mg total) by mouth every 72 hours as  "needed (ED)., Disp: 30 tablet, Rfl: 11    tamsulosin (FLOMAX) 0.4 mg Cap, Take 1 capsule by mouth once daily, Disp: 30 capsule, Rfl: 11    ciprofloxacin HCl (CIPRO) 500 MG tablet, Take 500 mg by mouth 2 (two) times daily., Disp: , Rfl:     gentamicin (GARAMYCIN) 40 mg/mL injection, , Disp: , Rfl:     Current Facility-Administered Medications:     gentamicin injection 80 mg, 80 mg, Intramuscular, 1 time in Clinic/HOD, Angela Finley NP    LIDOcaine (PF) 10 mg/ml (1%) injection 50 mg, 5 mL, Other, 1 time in Clinic/HOD, Sha Angel MD   Objective:      Vitals:    10/27/23 0705   BP: 122/84   BP Location: Right arm   Patient Position: Sitting   BP Method: Small (Manual)   Pulse: 70   Resp: 18   Temp: 98.1 °F (36.7 °C)   TempSrc: Oral   SpO2: 95%   Weight: 84.1 kg (185 lb 6.5 oz)   Height: 5' 10" (1.778 m)       Physical Exam  Constitutional:       General: He is not in acute distress.  HENT:      Head: Normocephalic and atraumatic.   Eyes:      Conjunctiva/sclera: Conjunctivae normal.   Cardiovascular:      Rate and Rhythm: Normal rate and regular rhythm.      Heart sounds: Normal heart sounds. No murmur heard.     No friction rub. No gallop.   Pulmonary:      Effort: Pulmonary effort is normal.      Breath sounds: Normal breath sounds. No wheezing or rales.   Musculoskeletal:      Cervical back: Neck supple.   Skin:     General: Skin is warm and dry.   Neurological:      Mental Status: He is alert and oriented to person, place, and time.   Psychiatric:         Behavior: Behavior normal.         Thought Content: Thought content normal.         Judgment: Judgment normal.            Assessment:       1. Annual physical exam    2. Encounter for colorectal cancer screening    3. Needs flu shot    4. Need for pneumococcal vaccine    5. Anemia, unspecified type    6. Prediabetes    7. Prostate cancer    8. Abnormal finding of blood chemistry, unspecified    9. Neutropenia, unspecified type    10. Incontinence of " feces, unspecified fecal incontinence type        Plan:       Annual physical exam    Encounter for colorectal cancer screening  -     Ambulatory referral/consult to Endo Procedure ; Future; Expected date: 10/27/2023    Needs flu shot  -     Influenza - Quadrivalent (Adjuvanted)    Need for pneumococcal vaccine  -     Pneumococcal Conjugate Vaccine (20 Valent) (IM)(Preferred)    Anemia, unspecified type  -     CBC Auto Differential; Future; Expected date: 10/27/2023  -     Lipid Panel; Future; Expected date: 10/27/2023    Prediabetes  -     Comprehensive Metabolic Panel; Future; Expected date: 10/27/2023  -     Hemoglobin A1C; Future; Expected date: 10/27/2023    Prostate cancer    Abnormal finding of blood chemistry, unspecified  -     Lipid Panel; Future; Expected date: 10/27/2023    Neutropenia, unspecified type  -     CBC Auto Differential; Future; Expected date: 10/27/2023    Anal leakage      F/u labs.  Patient did have a follow-up with urology NP about 3 days ago but was canceled by the provider and was advised to follow-up with PCP.  Patient was given Urology Clinic number to follow-up for prostate cancer.  Unsure the cause of leakage but discharge started after his biopsy.  Will have patient address but Urology prior to other referrals.          No future appointments.    Patient note was created using [employer].  Any errors in syntax or even information may not have been identified and edited on initial review prior to signing this note.

## 2023-11-06 ENCOUNTER — OFFICE VISIT (OUTPATIENT)
Dept: UROLOGY | Facility: CLINIC | Age: 68
End: 2023-11-06
Attending: UROLOGY
Payer: MEDICARE

## 2023-11-06 VITALS
SYSTOLIC BLOOD PRESSURE: 120 MMHG | HEART RATE: 82 BPM | DIASTOLIC BLOOD PRESSURE: 74 MMHG | OXYGEN SATURATION: 96 % | HEIGHT: 71 IN | BODY MASS INDEX: 25.93 KG/M2 | WEIGHT: 185.19 LBS

## 2023-11-06 DIAGNOSIS — R35.1 NOCTURIA: ICD-10-CM

## 2023-11-06 PROCEDURE — 1126F PR PAIN SEVERITY QUANTIFIED, NO PAIN PRESENT: ICD-10-PCS | Mod: HCNC,CPTII,S$GLB, | Performed by: UROLOGY

## 2023-11-06 PROCEDURE — 3078F PR MOST RECENT DIASTOLIC BLOOD PRESSURE < 80 MM HG: ICD-10-PCS | Mod: HCNC,CPTII,S$GLB, | Performed by: UROLOGY

## 2023-11-06 PROCEDURE — 1160F PR REVIEW ALL MEDS BY PRESCRIBER/CLIN PHARMACIST DOCUMENTED: ICD-10-PCS | Mod: HCNC,CPTII,S$GLB, | Performed by: UROLOGY

## 2023-11-06 PROCEDURE — 3044F HG A1C LEVEL LT 7.0%: CPT | Mod: HCNC,CPTII,S$GLB, | Performed by: UROLOGY

## 2023-11-06 PROCEDURE — 1160F RVW MEDS BY RX/DR IN RCRD: CPT | Mod: HCNC,CPTII,S$GLB, | Performed by: UROLOGY

## 2023-11-06 PROCEDURE — 3008F PR BODY MASS INDEX (BMI) DOCUMENTED: ICD-10-PCS | Mod: HCNC,CPTII,S$GLB, | Performed by: UROLOGY

## 2023-11-06 PROCEDURE — 99214 PR OFFICE/OUTPT VISIT, EST, LEVL IV, 30-39 MIN: ICD-10-PCS | Mod: HCNC,S$GLB,, | Performed by: UROLOGY

## 2023-11-06 PROCEDURE — 3074F PR MOST RECENT SYSTOLIC BLOOD PRESSURE < 130 MM HG: ICD-10-PCS | Mod: HCNC,CPTII,S$GLB, | Performed by: UROLOGY

## 2023-11-06 PROCEDURE — 3078F DIAST BP <80 MM HG: CPT | Mod: HCNC,CPTII,S$GLB, | Performed by: UROLOGY

## 2023-11-06 PROCEDURE — 3288F FALL RISK ASSESSMENT DOCD: CPT | Mod: HCNC,CPTII,S$GLB, | Performed by: UROLOGY

## 2023-11-06 PROCEDURE — 99214 OFFICE O/P EST MOD 30 MIN: CPT | Mod: HCNC,S$GLB,, | Performed by: UROLOGY

## 2023-11-06 PROCEDURE — 3008F BODY MASS INDEX DOCD: CPT | Mod: HCNC,CPTII,S$GLB, | Performed by: UROLOGY

## 2023-11-06 PROCEDURE — 1126F AMNT PAIN NOTED NONE PRSNT: CPT | Mod: HCNC,CPTII,S$GLB, | Performed by: UROLOGY

## 2023-11-06 PROCEDURE — 1159F PR MEDICATION LIST DOCUMENTED IN MEDICAL RECORD: ICD-10-PCS | Mod: HCNC,CPTII,S$GLB, | Performed by: UROLOGY

## 2023-11-06 PROCEDURE — 1101F PR PT FALLS ASSESS DOC 0-1 FALLS W/OUT INJ PAST YR: ICD-10-PCS | Mod: HCNC,CPTII,S$GLB, | Performed by: UROLOGY

## 2023-11-06 PROCEDURE — 3288F PR FALLS RISK ASSESSMENT DOCUMENTED: ICD-10-PCS | Mod: HCNC,CPTII,S$GLB, | Performed by: UROLOGY

## 2023-11-06 PROCEDURE — 1101F PT FALLS ASSESS-DOCD LE1/YR: CPT | Mod: HCNC,CPTII,S$GLB, | Performed by: UROLOGY

## 2023-11-06 PROCEDURE — 1159F MED LIST DOCD IN RCRD: CPT | Mod: HCNC,CPTII,S$GLB, | Performed by: UROLOGY

## 2023-11-06 PROCEDURE — 3074F SYST BP LT 130 MM HG: CPT | Mod: HCNC,CPTII,S$GLB, | Performed by: UROLOGY

## 2023-11-06 PROCEDURE — 3044F PR MOST RECENT HEMOGLOBIN A1C LEVEL <7.0%: ICD-10-PCS | Mod: HCNC,CPTII,S$GLB, | Performed by: UROLOGY

## 2023-11-06 NOTE — PROGRESS NOTES
"Subjective:      Herbie Naqvi is a 68 y.o. y.o. male who returns today regarding his prostate cancer.    Most recently s/p repeat TRUS biopsy 06/08/23 which revealed GG2 disease in left mid and apex, and GG1 disease in the left base (6/12 cores total).  PSA at that time 14.9. He presents today to discuss biopsy pathology.    He reports some dysuria, nocturia x2-3, and frequency. He is bothered by the nocturia.  He drinks fluids until bed time.     Prostate volume on MRI 2023: 45 cc.     Urologic history:  He is s/p TRUS/biopsy on 2/14/22 that demonstrated low risk prostate cancer (milton 3+3=6 in 1 core).  Opted for AS.    The following portions of the patient's history were reviewed and updated as appropriate: allergies, current medications, past family history, past medical history, past social history, past surgical history and problem list.       Objective:   Vitals: /74 (BP Location: Left arm, Patient Position: Sitting, BP Method: Large (Automatic))   Pulse 82   Ht 5' 11" (1.803 m)   Wt 84 kg (185 lb 3 oz)   SpO2 96%   BMI 25.83 kg/m²      Physical Exam   General: alert and oriented, no acute distress  Respiratory: Symmetric expansion, non-labored breathing  Neuro: no gross deficits  Psych: normal judgment and insight, normal mood/affect and non-anxious    Lab Review   Urine dipstick - negative for all components.    Component      Latest Ref Rng & Units 3/21/2023   PSA Diagnostic      0.00 - 4.00 ng/mL 14.9 (H)     Imaging  MRI Prostate:   No lesions; PIRADS 2    Pathology  Prostate Biopsy      Assessment and Plan:   Prostate Cancer  -- Discussed management of unfavorable intermediate risk prostate cancer as per the NCCN guidelines. Patient would like to meet with UroOnc and RadOnc  -- Will arrange follow up with Dr. Miller and Dr. Farrar to discuss further management of his prostate cancer    Nocturia  -- Discussed lifestyle changes including fluid restriction 2 hours before bedtime  -- Also " discussed alpha blocker therapy  -- Patient would like to implement lifestyle changes at this time

## 2023-11-19 NOTE — PROGRESS NOTES
Subjective:      Herbie Naqvi is a 68 y.o. male who returns today regarding his     No previous ab surgery  PCA on active surveillance with Dr Angel    No LUTS  ++freq    ED poor response to Cialis    The following portions of the patient's history were reviewed and updated as appropriate: allergies, current medications, past family history, past medical history, past social history, past surgical history and problem list.    Review of Systems  Pertinent items are noted in HPI.  A comprehensive multipoint review of systems was negative except as otherwise stated in the HPI.    No past medical history on file.  Past Surgical History:   Procedure Laterality Date    COLONOSCOPY N/A 11/30/2017    Procedure: COLONOSCOPY;  Surgeon: John Roman MD;  Location: [facility];  Service: Endoscopy;  Laterality: N/A;  colonoscopy confirmed    wrist broke       rt side wrist     Diabetes Medications               gentamicin (GARAMYCIN) 40 mg/mL injection           Review of patient's allergies indicates:  No Known Allergies       Objective:   Vitals: There were no vitals taken for this visit.    Physical Exam   General: alert and oriented, no acute distress  Respiratory: Symmetric expansion, non-labored breathing  Cardiovascular: no peripheral edema  Abdomen: soft, non distended  Skin: normal coloration and turgor, no rashes, no suspicious skin lesions noted  Neuro: no gross deficits  Psych: normal judgment and insight, normal mood/affect, and non-anxious    Physical Exam    Lab Review   Urinalysis demonstrates no specimen    Lab Results   Component Value Date    WBC 3.83 (L) 10/27/2023    HGB 13.7 (L) 10/27/2023    HCT 41.2 10/27/2023    MCV 91 10/27/2023     10/27/2023     Lab Results   Component Value Date    CREATININE 0.8 10/27/2023    BUN 12 10/27/2023     Lab Results   Component Value Date    PSA 5.9 (H) 12/13/2021    PSA 2.4 10/05/2020    PSA 1.7 03/19/2019    PSADIAG 14.9 (H) 03/21/2023; psa density 0.33     PSADIAG 9.0 (H) 09/22/2022    PSATOTAL 6.9 (H) 12/30/2021                     Component 5 mo ago   Final Pathologic Diagnosis RELIAPATH DIAGNOSIS:    A.  PROSTATE, RIGHT BASE, NEEDLE BIOPSY:  Benign prostatic tissue with chronic inflammation. (R97.20)    B.  PROSTATE, RIGHT MID, NEEDLE BIOPSY:  Benign prostatic tissue with chronic inflammation, see comment. (R97.20)    C.  PROSTATE, RIGHT APEX, NEEDLE BIOPSY:  Benign prostatic tissue. (R97.20)    D.  PROSTATE, LEFT BASE, NEEDLE BIOPSY:  HISTOLOGIC TYPE: Acinar adenocarcinoma, see comment.  HISTOLOGIC GRADE:  Grade Group 1 (Rufus Score 3+3=6)  INTRADUCTAL CARCINOMA: Not identified.  CRIBRIFORM GLANDS: Not identified.  TREATMENT EFFECT: Not identified.  TUMOR QUANTITATION:  Number of Cores Positive: 2.  Total Number of Cores: 2.  Measurement of Tumor: 2 mm.  Measurement of Core Tissue: 26 mm.  Percentage of Prostatic Tissue Involved by Tumor: 8%  LYMPHOVASCULAR INVASION: Not identified.  PERINEURAL INVASION: Not identified. (C61.)    E.  PROSTATE, LEFT MID, NEEDLE BIOPSY:  HISTOLOGIC TYPE: Acinar adenocarcinoma, see comment.  HISTOLOGIC GRADE:  Grade Group 2 (Rufus Score 3+4=7)  Percentage of Pattern 4: 10%.  INTRADUCTAL CARCINOMA: Not identified.  CRIBRIFORM GLANDS: Not identified.  TREATMENT EFFECT: Not identified.  TUMOR QUANTITATION:  Number of Cores Positive: 2.  Total Number of Cores: 2.  Measurement of Tumor: 6 mm.  Measurement of Core Tissue: 21 mm.  Percentage of Prostatic Tissue Involved by Tumor: 29%.  LYMPHOVASCULAR INVASION: Not identified.  PERINEURAL INVASION: Not identified. (C61.)    F.  PROSTATE, LEFT APEX, NEEDLE BIOPSY:  PROSTATE GLAND: NEEDLE BIOPSY    HISTOLOGIC TYPE: Acinar adenocarcinoma.  HISTOLOGIC GRADE:  Grade Group 2 (Rufus Score 3+4=7)  Percentage of Pattern 4: 20%.  INTRADUCTAL CARCINOMA: Not identified.  CRIBRIFORM GLANDS: Present.  TREATMENT EFFECT: No known presurgical therapy.  TUMOR QUANTITATION:  Number of Cores Positive:  2  Total Number of Cores: 2  Measurement of Tumor: 10 mm.  Measurement of Core Tissue: 26 mm.  Percentage of Prostatic Tissue Involved by Tumor: 38%  LYMPHOVASCULAR INVASION: Not identified.  PERINEURAL INVASION: Not identified. (C61.)    COMMENT:  *Immunohistochemical stain for prostate triple stain was performed on blocks B1, D1, and E1, and results of staining support the above diagnoses.      LISA DISLA M.D.    Report attached.    Performing site:  [address] [address]    &quot;Disclaimer:  This case diagnosis was rendered completely by the outside consultation pathologist and the case is electronically signed by an [facility] pathologist listed below solely to release the report into the medical record.&quot;   Comment: Interp By Kay Park M.D., Signed on 06/16/2023 at 17:15         Imaging  MRI PROSTATE W W/O CONTRAST     CLINICAL HISTORY:  Prostate cancer, staging;  Elevated prostate specific antigen (PSA)     Additional history: TRUS biopsy on 2/14/22  (milton 3+3=6 in 1 core)     TECHNIQUE:  Multiparametric MRI of the prostate/pelvis performed on a 3T scanner with phase pelvic coil. Multiplanar, multisequence images including high resolution, small field-of-view T2-WI; axial diffusion weighted images with multiple B-values and creation of ADC-maps; and dynamic contrast enhanced T1-weighted images through the prostate were obtained before, during, and after the administration of 10 cc intravenous gadolinium.     COMPARISON:  None     FINDINGS:  Previous biopsy: TRUS 02/14/2022     PSA: 14.9 ng/mL 03/21/2023     Prior therapy: None     Prostate: 5.1 x 3.8 x 4.7 cm corresponding to a computed volume of 44.7 cc.     Peripheral zone: Diffuse low to mild T2 hypointense signal which may be related to distortion from adjacent bowel gas.  No convincing focal lesions.     Transitional zone: Benign prostatic hyperplasia without focal suspicious abnormality, score 2.     Neurovascular  bundle: Normal appearance.     Seminal vesicles: Normal appearance.     Adjacent Organ Involvement: No evidence for urinary bladder or rectal invasion.     Lymphadenopathy: None.     Other Findings: None.     Impression:     No concerning focal lesion.     Benign prostatic hyperplasia.     Overall Assessment: PI-RADS 2 - Low (clinically significant cancer is unlikely to be present), limited evaluation as above.     Number of targets created for potential MR/US fusion biopsy     Peripheral zone: 0     Transition zone: 0     This report was flagged in Epic as abnormal.     Electronically signed by resident: Alex Salcedo  Date:                                            04/28/2023  Time:                                           15:58     Electronically signed by: Mitchel Rutledge MD  Date:                                            04/29/2023  Time:                                           08:17  Assessment and Plan:   Prostate cancer  Rufus 7 3+4 GG2 cT1c cN0 cMx; psa 14.9; unfavorable intermediate risk due to PSA progression      We discussed his grade and stage of disease, life expectancy and active surveillance vs treatment.  We discussed the roles of surgery, radiation (external and brachytherapy), HIFU and Cryosurgery, hormonal therapy and chemotherapy in the treatment of prostate cancer.  We discussed brachytherapy and external radiation therapy and open and robotic surgery.  We discussed the risks and benefits of each. We discussed erectile dysfunction, urinary incontinence and bowel issues.  We discussed referral to radiation oncology to further discuss options.  I answered his questions.      PSMA PET ASAP  Seeing Dr Farrar, rad onc, today    Tentatively schedule robotic prostatectomy and PLND 12/20    Preop appt with me ASAP after PET scan and before 12/20    Enlarged prostate 45g  Minimal symptoms    Erectile dysfunction, unspecified erectile dysfunction type  Cialis prn      UA to rule out UTI as  source of elevated psa        I spent 60 min on the day of this encounter preparing for, treating and managing the above

## 2023-11-20 ENCOUNTER — OFFICE VISIT (OUTPATIENT)
Dept: UROLOGY | Facility: CLINIC | Age: 68
End: 2023-11-20
Payer: MEDICARE

## 2023-11-20 ENCOUNTER — OFFICE VISIT (OUTPATIENT)
Dept: RADIATION ONCOLOGY | Facility: CLINIC | Age: 68
End: 2023-11-20
Attending: RADIOLOGY
Payer: MEDICARE

## 2023-11-20 VITALS
RESPIRATION RATE: 16 BRPM | OXYGEN SATURATION: 95 % | SYSTOLIC BLOOD PRESSURE: 134 MMHG | BODY MASS INDEX: 29.31 KG/M2 | DIASTOLIC BLOOD PRESSURE: 78 MMHG | WEIGHT: 186.75 LBS | HEIGHT: 67 IN | HEART RATE: 74 BPM

## 2023-11-20 VITALS
SYSTOLIC BLOOD PRESSURE: 134 MMHG | OXYGEN SATURATION: 95 % | BODY MASS INDEX: 29.31 KG/M2 | RESPIRATION RATE: 16 BRPM | DIASTOLIC BLOOD PRESSURE: 78 MMHG | HEART RATE: 74 BPM | WEIGHT: 186.75 LBS | HEIGHT: 67 IN

## 2023-11-20 DIAGNOSIS — N52.9 ERECTILE DYSFUNCTION, UNSPECIFIED ERECTILE DYSFUNCTION TYPE: ICD-10-CM

## 2023-11-20 DIAGNOSIS — N40.0 ENLARGED PROSTATE: ICD-10-CM

## 2023-11-20 DIAGNOSIS — C61 PROSTATE CANCER: Primary | ICD-10-CM

## 2023-11-20 PROCEDURE — 3078F PR MOST RECENT DIASTOLIC BLOOD PRESSURE < 80 MM HG: ICD-10-PCS | Mod: HCNC,CPTII,S$GLB, | Performed by: UROLOGY

## 2023-11-20 PROCEDURE — 1101F PT FALLS ASSESS-DOCD LE1/YR: CPT | Mod: HCNC,CPTII,S$GLB, | Performed by: RADIOLOGY

## 2023-11-20 PROCEDURE — 3008F PR BODY MASS INDEX (BMI) DOCUMENTED: ICD-10-PCS | Mod: HCNC,CPTII,S$GLB, | Performed by: RADIOLOGY

## 2023-11-20 PROCEDURE — 1126F AMNT PAIN NOTED NONE PRSNT: CPT | Mod: HCNC,CPTII,S$GLB, | Performed by: UROLOGY

## 2023-11-20 PROCEDURE — 3008F PR BODY MASS INDEX (BMI) DOCUMENTED: ICD-10-PCS | Mod: HCNC,CPTII,S$GLB, | Performed by: UROLOGY

## 2023-11-20 PROCEDURE — 1101F PR PT FALLS ASSESS DOC 0-1 FALLS W/OUT INJ PAST YR: ICD-10-PCS | Mod: HCNC,CPTII,S$GLB, | Performed by: UROLOGY

## 2023-11-20 PROCEDURE — 99203 PR OFFICE/OUTPT VISIT, NEW, LEVL III, 30-44 MIN: ICD-10-PCS | Mod: HCNC,S$GLB,, | Performed by: RADIOLOGY

## 2023-11-20 PROCEDURE — 3078F DIAST BP <80 MM HG: CPT | Mod: HCNC,CPTII,S$GLB, | Performed by: UROLOGY

## 2023-11-20 PROCEDURE — 99999 PR PBB SHADOW E&M-EST. PATIENT-LVL III: CPT | Mod: PBBFAC,HCNC,, | Performed by: RADIOLOGY

## 2023-11-20 PROCEDURE — 3075F SYST BP GE 130 - 139MM HG: CPT | Mod: HCNC,CPTII,S$GLB, | Performed by: UROLOGY

## 2023-11-20 PROCEDURE — 3008F BODY MASS INDEX DOCD: CPT | Mod: HCNC,CPTII,S$GLB, | Performed by: RADIOLOGY

## 2023-11-20 PROCEDURE — 3044F PR MOST RECENT HEMOGLOBIN A1C LEVEL <7.0%: ICD-10-PCS | Mod: HCNC,CPTII,S$GLB, | Performed by: RADIOLOGY

## 2023-11-20 PROCEDURE — 1126F PR PAIN SEVERITY QUANTIFIED, NO PAIN PRESENT: ICD-10-PCS | Mod: HCNC,CPTII,S$GLB, | Performed by: RADIOLOGY

## 2023-11-20 PROCEDURE — 1160F RVW MEDS BY RX/DR IN RCRD: CPT | Mod: HCNC,CPTII,S$GLB, | Performed by: RADIOLOGY

## 2023-11-20 PROCEDURE — 1160F PR REVIEW ALL MEDS BY PRESCRIBER/CLIN PHARMACIST DOCUMENTED: ICD-10-PCS | Mod: HCNC,CPTII,S$GLB, | Performed by: RADIOLOGY

## 2023-11-20 PROCEDURE — 1159F MED LIST DOCD IN RCRD: CPT | Mod: HCNC,CPTII,S$GLB, | Performed by: RADIOLOGY

## 2023-11-20 PROCEDURE — 1126F PR PAIN SEVERITY QUANTIFIED, NO PAIN PRESENT: ICD-10-PCS | Mod: HCNC,CPTII,S$GLB, | Performed by: UROLOGY

## 2023-11-20 PROCEDURE — 3075F PR MOST RECENT SYSTOLIC BLOOD PRESS GE 130-139MM HG: ICD-10-PCS | Mod: HCNC,CPTII,S$GLB, | Performed by: RADIOLOGY

## 2023-11-20 PROCEDURE — 1159F PR MEDICATION LIST DOCUMENTED IN MEDICAL RECORD: ICD-10-PCS | Mod: HCNC,CPTII,S$GLB, | Performed by: UROLOGY

## 2023-11-20 PROCEDURE — 99999 PR PBB SHADOW E&M-EST. PATIENT-LVL III: ICD-10-PCS | Mod: PBBFAC,HCNC,, | Performed by: RADIOLOGY

## 2023-11-20 PROCEDURE — 3008F BODY MASS INDEX DOCD: CPT | Mod: HCNC,CPTII,S$GLB, | Performed by: UROLOGY

## 2023-11-20 PROCEDURE — 1159F PR MEDICATION LIST DOCUMENTED IN MEDICAL RECORD: ICD-10-PCS | Mod: HCNC,CPTII,S$GLB, | Performed by: RADIOLOGY

## 2023-11-20 PROCEDURE — 3044F HG A1C LEVEL LT 7.0%: CPT | Mod: HCNC,CPTII,S$GLB, | Performed by: UROLOGY

## 2023-11-20 PROCEDURE — 3044F HG A1C LEVEL LT 7.0%: CPT | Mod: HCNC,CPTII,S$GLB, | Performed by: RADIOLOGY

## 2023-11-20 PROCEDURE — 3078F DIAST BP <80 MM HG: CPT | Mod: HCNC,CPTII,S$GLB, | Performed by: RADIOLOGY

## 2023-11-20 PROCEDURE — 1126F AMNT PAIN NOTED NONE PRSNT: CPT | Mod: HCNC,CPTII,S$GLB, | Performed by: RADIOLOGY

## 2023-11-20 PROCEDURE — 99215 OFFICE O/P EST HI 40 MIN: CPT | Mod: HCNC,S$GLB,, | Performed by: UROLOGY

## 2023-11-20 PROCEDURE — 1101F PT FALLS ASSESS-DOCD LE1/YR: CPT | Mod: HCNC,CPTII,S$GLB, | Performed by: UROLOGY

## 2023-11-20 PROCEDURE — 3288F FALL RISK ASSESSMENT DOCD: CPT | Mod: HCNC,CPTII,S$GLB, | Performed by: RADIOLOGY

## 2023-11-20 PROCEDURE — 1159F MED LIST DOCD IN RCRD: CPT | Mod: HCNC,CPTII,S$GLB, | Performed by: UROLOGY

## 2023-11-20 PROCEDURE — 3075F PR MOST RECENT SYSTOLIC BLOOD PRESS GE 130-139MM HG: ICD-10-PCS | Mod: HCNC,CPTII,S$GLB, | Performed by: UROLOGY

## 2023-11-20 PROCEDURE — 3288F PR FALLS RISK ASSESSMENT DOCUMENTED: ICD-10-PCS | Mod: HCNC,CPTII,S$GLB, | Performed by: UROLOGY

## 2023-11-20 PROCEDURE — 3075F SYST BP GE 130 - 139MM HG: CPT | Mod: HCNC,CPTII,S$GLB, | Performed by: RADIOLOGY

## 2023-11-20 PROCEDURE — 3288F PR FALLS RISK ASSESSMENT DOCUMENTED: ICD-10-PCS | Mod: HCNC,CPTII,S$GLB, | Performed by: RADIOLOGY

## 2023-11-20 PROCEDURE — 3078F PR MOST RECENT DIASTOLIC BLOOD PRESSURE < 80 MM HG: ICD-10-PCS | Mod: HCNC,CPTII,S$GLB, | Performed by: RADIOLOGY

## 2023-11-20 PROCEDURE — 99203 OFFICE O/P NEW LOW 30 MIN: CPT | Mod: HCNC,S$GLB,, | Performed by: RADIOLOGY

## 2023-11-20 PROCEDURE — 99215 PR OFFICE/OUTPT VISIT, EST, LEVL V, 40-54 MIN: ICD-10-PCS | Mod: HCNC,S$GLB,, | Performed by: UROLOGY

## 2023-11-20 PROCEDURE — 3288F FALL RISK ASSESSMENT DOCD: CPT | Mod: HCNC,CPTII,S$GLB, | Performed by: UROLOGY

## 2023-11-20 PROCEDURE — 1101F PR PT FALLS ASSESS DOC 0-1 FALLS W/OUT INJ PAST YR: ICD-10-PCS | Mod: HCNC,CPTII,S$GLB, | Performed by: RADIOLOGY

## 2023-11-20 PROCEDURE — 3044F PR MOST RECENT HEMOGLOBIN A1C LEVEL <7.0%: ICD-10-PCS | Mod: HCNC,CPTII,S$GLB, | Performed by: UROLOGY

## 2023-11-20 RX ORDER — HEPARIN SODIUM 5000 [USP'U]/ML
5000 INJECTION, SOLUTION INTRAVENOUS; SUBCUTANEOUS
Status: CANCELLED | OUTPATIENT
Start: 2023-12-20 | End: 2023-12-20

## 2023-11-20 NOTE — Clinical Note
PSMA PE ASAP Preop appt with Dr Miller after PET Schedule surgery 12/20 Voiding trial with Dr Miller 12/29. thanks

## 2023-11-20 NOTE — PROGRESS NOTES
Multidisciplinary Uro-Oncology Clinic  [facility] - Radiation Oncology    HISTORY OF PRESENT ILLNESS:   This patient presents for discussion of treatment options for his prostate cancer.      Mr. Naqvi was initially referred to Urology after repeat PSA in December of 2021 returned at 6.9 ng/ml.  Subsequent biopsies in February of 2022  revealed Rufus 6 adenocarcinoma involving 4% of 1/2 cores from the Lt. base. The remaining biopsies were negative.  Repeat PSA in September increased to 9 ng/ml.  His most recent PSA in March of 2023 was 14.9 ng/ml.  MRI in April revealed a 44 cc prostate with no suspicious lesions in the peripheral or transitional zone.  Repeat biopsies on 6/8/23 revealed [place] 7 (3+4) adenocarcinoma involving 29 - 38% of 4/4 cores from the Lt. mid gland and Lt. apex.  The Rufus pattern 4 accounted for 10 - 20% of the tumor.  There was no perineural invasion.  There was [place] 6 (3+3) adenocarcinoma involving 8% of 2/2 cores from the Lt. base with no perineural invasion.  The patient presents  for discussion of treatment options.     REVIEW OF SYSTEMS:   Review of Systems   Constitutional:  Negative for chills, fever, malaise/fatigue and weight loss.   Respiratory:  Negative for cough and shortness of breath.    Cardiovascular:  Negative for chest pain and palpitations.   Gastrointestinal:  Negative for abdominal pain, constipation and diarrhea.   Genitourinary:  Positive for frequency. Negative for dysuria, hematuria and urgency.        Notes ED    Musculoskeletal:  Negative for back pain and neck pain.         PAST MEDICAL HISTORY:  Past Medical History:   Diagnosis Date    Prostate cancer        PAST SURGICAL HISTORY:  Past Surgical History:   Procedure Laterality Date    COLONOSCOPY N/A 11/30/2017    Procedure: COLONOSCOPY;  Surgeon: John Roman MD;  Location: [facility];  Service: Endoscopy;  Laterality: N/A;  colonoscopy confirmed    wrist broke        rt side wrist       ALLERGIES:   Review of patient's allergies indicates:  No Known Allergies    MEDICATIONS:  Current Outpatient Medications   Medication Sig    ciprofloxacin HCl (CIPRO) 500 MG tablet Take 500 mg by mouth 2 (two) times daily.    gentamicin (GARAMYCIN) 40 mg/mL injection     latanoprost 0.005 % ophthalmic solution Place 1 drop into both eyes once daily.    meloxicam (MOBIC) 15 MG tablet Take 1 tablet (15 mg total) by mouth once daily.    tadalafiL (CIALIS) 20 MG Tab Take 1 tablet (20 mg total) by mouth every 72 hours as needed (ED).    tamsulosin (FLOMAX) 0.4 mg Cap Take 1 capsule by mouth once daily     Current Facility-Administered Medications   Medication    gentamicin injection 80 mg    LIDOcaine (PF) 10 mg/ml (1%) injection 50 mg       SOCIAL HISTORY:  Social History     Socioeconomic History    Marital status:    Tobacco Use    Smoking status: Former     Types: Cigarettes     Start date: 3/25/2014    Smokeless tobacco: Never   Substance and Sexual Activity    Alcohol use: No     Comment: former drinker social stop drinking    Drug use: No    Sexual activity: Yes     Partners: Female     Birth control/protection: None     Comment:        FAMILY HISTORY:  Family History   Problem Relation Age of Onset    Diabetes Mother     Heart disease Father     Hyperlipidemia Father          PHYSICAL EXAMINATION:  Vitals:    11/20/23 1518   BP: 134/78   Pulse: 74   Resp: 16     Physical Exam  Constitutional:       General: He is not in acute distress.     Appearance: Normal appearance.   Pulmonary:      Effort: Pulmonary effort is normal. No respiratory distress.   Abdominal:      General: Abdomen is flat. There is no distension.   Neurological:      Mental Status: He is alert and oriented to person, place, and time.   Psychiatric:         Mood and Affect: Mood normal.         Judgment: Judgment normal.         ASSESSMENT/PLAN:  History of Clinical Stage I (T1c, N0, M0, GG1, PSA < 10)  prostate cancer with progression to stage IIB (GG2, PSA 10-20) disease.      ECO    I had a discussion with Mr. Naqvi and his wife.  Explained he initially had low risk prostate cancer and now has intermediate risk disease.  Explained based on his PSA and pathology, we would recommend he consider some form of definitive treatment.  Discussed the options of RALP, vs definitive radiotherapy.  Discussed the pros and cons of each treatment approach.  Discussed the procedures, risks and benefits of radiotherapy.  After our discussion the patient felt he would like to proceed with surgery.  Explained this is a very reasonable choice given his MRI and pathology.  Thank you for allowing us to participate in the care of this patient.      Psychosocial Distress screening score of Distress Score: 3 noted and reviewed. No intervention indicated.     I spent approximately 30 minutes reviewing the available records and evaluating the patient, out of which over 50% of the time was spent face to face with the patient in counseling and coordinating this patient's care.

## 2023-12-01 ENCOUNTER — HOSPITAL ENCOUNTER (OUTPATIENT)
Dept: RADIOLOGY | Facility: HOSPITAL | Age: 68
Discharge: HOME | End: 2023-12-01
Attending: UROLOGY
Payer: MEDICARE

## 2023-12-01 DIAGNOSIS — C61 PROSTATE CANCER: ICD-10-CM

## 2023-12-01 PROCEDURE — 78815 PET IMAGE W/CT SKULL-THIGH: CPT | Mod: TC,HCNC,PI

## 2023-12-01 PROCEDURE — A9595 NM PET CT F 18 PYL PSMA, MIDTHIGH TO VERTEX: HCPCS | Mod: TB,HCNC

## 2023-12-01 PROCEDURE — 78815 NM PET CT F 18 PYL PSMA, MIDTHIGH TO VERTEX: ICD-10-PCS | Mod: 26,PI,HCNC, | Performed by: STUDENT IN AN ORGANIZED HEALTH CARE EDUCATION/TRAINING PROGRAM

## 2023-12-01 PROCEDURE — 78815 PET IMAGE W/CT SKULL-THIGH: CPT | Mod: 26,PI,HCNC, | Performed by: STUDENT IN AN ORGANIZED HEALTH CARE EDUCATION/TRAINING PROGRAM

## 2023-12-05 NOTE — PROGRESS NOTES
Subjective:      Herbie Naqvi is a 68 y.o. male who returns today regarding his     Preop RALP    No previous ab surgeries    No LUTS  Some ED  Uses cialis.    The following portions of the patient's history were reviewed and updated as appropriate: allergies, current medications, past family history, past medical history, past social history, past surgical history and problem list.    Review of Systems  Pertinent items are noted in HPI.  A comprehensive multipoint review of systems was negative except as otherwise stated in the HPI.    Past Medical History:   Diagnosis Date    Prostate cancer      Past Surgical History:   Procedure Laterality Date    COLONOSCOPY N/A 11/30/2017    Procedure: COLONOSCOPY;  Surgeon: John Roman MD;  Location: [facility];  Service: Endoscopy;  Laterality: N/A;  colonoscopy confirmed    wrist broke       rt side wrist     Diabetes Medications               gentamicin (GARAMYCIN) 40 mg/mL injection           Review of patient's allergies indicates:  No Known Allergies       Objective:   Vitals: There were no vitals taken for this visit.    Physical Exam   General: alert and oriented, no acute distress  Respiratory: Symmetric expansion, non-labored breathing  Cardiovascular: no peripheral edema  Abdomen: soft, non distended  Skin: normal coloration and turgor, no rashes, no suspicious skin lesions noted  Neuro: no gross deficits  Psych: normal judgment and insight, normal mood/affect, and non-anxious    Physical Exam    Lab Review   Urinalysis demonstrates no specimen    Lab Results   Component Value Date    WBC 3.83 (L) 10/27/2023    HGB 13.7 (L) 10/27/2023    HCT 41.2 10/27/2023    MCV 91 10/27/2023     10/27/2023     Lab Results   Component Value Date    CREATININE 0.8 10/27/2023    BUN 12 10/27/2023     Lab Results   Component Value Date    PSA 5.9 (H) 12/13/2021    PSA 2.4 10/05/2020    PSA 1.7 03/19/2019    PSADIAG 14.9 (H) 03/21/2023    PSADIAG 9.0 (H) 09/22/2022     PSATOTAL 6.9 (H) 12/30/2021    PSAFREE 0.74 12/30/2021    PSAFREEPCT 10.72 12/30/2021       PSA 5.9 (H) 12/13/2021      PSA 2.4 10/05/2020     PSA 1.7 03/19/2019     PSADIAG 14.9 (H) 03/21/2023; psa density 0.33     PSADIAG 9.0 (H) 09/22/2022     PSATOTAL 6.9 (H) 12/30/2021                                Component 5 mo ago   Final Pathologic Diagnosis RELIAPATH DIAGNOSIS:    A.  PROSTATE, RIGHT BASE, NEEDLE BIOPSY:  Benign prostatic tissue with chronic inflammation. (R97.20)    B.  PROSTATE, RIGHT MID, NEEDLE BIOPSY:  Benign prostatic tissue with chronic inflammation, see comment. (R97.20)    C.  PROSTATE, RIGHT APEX, NEEDLE BIOPSY:  Benign prostatic tissue. (R97.20)    D.  PROSTATE, LEFT BASE, NEEDLE BIOPSY:  HISTOLOGIC TYPE: Acinar adenocarcinoma, see comment.  HISTOLOGIC GRADE:  Grade Group 1 (Irrigon Score 3+3=6)  INTRADUCTAL CARCINOMA: Not identified.  CRIBRIFORM GLANDS: Not identified.  TREATMENT EFFECT: Not identified.  TUMOR QUANTITATION:  Number of Cores Positive: 2.  Total Number of Cores: 2.  Measurement of Tumor: 2 mm.  Measurement of Core Tissue: 26 mm.  Percentage of Prostatic Tissue Involved by Tumor: 8%  LYMPHOVASCULAR INVASION: Not identified.  PERINEURAL INVASION: Not identified. (C61.)    E.  PROSTATE, LEFT MID, NEEDLE BIOPSY:  HISTOLOGIC TYPE: Acinar adenocarcinoma, see comment.  HISTOLOGIC GRADE:  Grade Group 2 (Irrigon Score 3+4=7)  Percentage of Pattern 4: 10%.  INTRADUCTAL CARCINOMA: Not identified.  CRIBRIFORM GLANDS: Not identified.  TREATMENT EFFECT: Not identified.  TUMOR QUANTITATION:  Number of Cores Positive: 2.  Total Number of Cores: 2.  Measurement of Tumor: 6 mm.  Measurement of Core Tissue: 21 mm.  Percentage of Prostatic Tissue Involved by Tumor: 29%.  LYMPHOVASCULAR INVASION: Not identified.  PERINEURAL INVASION: Not identified. (C61.)    F.  PROSTATE, LEFT APEX, NEEDLE BIOPSY:  PROSTATE GLAND: NEEDLE BIOPSY    HISTOLOGIC TYPE: Acinar adenocarcinoma.  HISTOLOGIC GRADE:  Grade  Group 2 (Milton Score 3+4=7)  Percentage of Pattern 4: 20%.  INTRADUCTAL CARCINOMA: Not identified.  CRIBRIFORM GLANDS: Present.  TREATMENT EFFECT: No known presurgical therapy.  TUMOR QUANTITATION:  Number of Cores Positive: 2  Total Number of Cores: 2  Measurement of Tumor: 10 mm.  Measurement of Core Tissue: 26 mm.  Percentage of Prostatic Tissue Involved by Tumor: 38%  LYMPHOVASCULAR INVASION: Not identified.  PERINEURAL INVASION: Not identified. (C61.)    COMMENT:  *Immunohistochemical stain for prostate triple stain was performed on blocks B1, D1, and E1, and results of staining support the above diagnoses.      LISA DISLA M.D.    Report attached.    Performing site:  [address] [address]    &quot;Disclaimer:  This case diagnosis was rendered completely by the outside consultation pathologist and the case is electronically signed by an [facility] pathologist listed below solely to release the report into the medical record.&quot;   Comment: Interp By Kay Park M.D., Signed on 06/16/2023 at 17:15            Imaging  MRI PROSTATE W W/O CONTRAST     CLINICAL HISTORY:  Prostate cancer, staging;  Elevated prostate specific antigen (PSA)     Additional history: TRUS biopsy on 2/14/22  (milton 3+3=6 in 1 core)     TECHNIQUE:  Multiparametric MRI of the prostate/pelvis performed on a 3T scanner with phase pelvic coil. Multiplanar, multisequence images including high resolution, small field-of-view T2-WI; axial diffusion weighted images with multiple B-values and creation of ADC-maps; and dynamic contrast enhanced T1-weighted images through the prostate were obtained before, during, and after the administration of 10 cc intravenous gadolinium.     COMPARISON:  None     FINDINGS:  Previous biopsy: TRUS 02/14/2022     PSA: 14.9 ng/mL 03/21/2023     Prior therapy: None     Prostate: 5.1 x 3.8 x 4.7 cm corresponding to a computed volume of 44.7 cc.     Peripheral zone: Diffuse low to  mild T2 hypointense signal which may be related to distortion from adjacent bowel gas.  No convincing focal lesions.     Transitional zone: Benign prostatic hyperplasia without focal suspicious abnormality, score 2.     Neurovascular bundle: Normal appearance.     Seminal vesicles: Normal appearance.     Adjacent Organ Involvement: No evidence for urinary bladder or rectal invasion.     Lymphadenopathy: None.     Other Findings: None.     Impression:     No concerning focal lesion.     Benign prostatic hyperplasia.     Overall Assessment: PI-RADS 2 - Low (clinically significant cancer is unlikely to be present), limited evaluation as above.     Number of targets created for potential MR/US fusion biopsy     Peripheral zone: 0     Transition zone: 0     This report was flagged in Epic as abnormal.     Electronically signed by resident: Alex Salcedo  Date:                                            04/28/2023  Time:                                           15:58     Electronically signed by: Mitchel Rutledge MD  Date:                                            04/29/2023  Time:                                           08:17      Imaging  NM PET CT F 18 PYL PSMA, MIDTHIGH TO VERTEX     CLINICAL HISTORY:  [place] 7 3+4 GG2 cT1c cN0 cMx; psa 14.9 prostate; please do ASAP; surgery scheduled 12/20;  Malignant neoplasm of prostate     TECHNIQUE:  Approximately 60 minutes following the intravenous injection of 7.92 mCi F-18 piflufolastat, PET images were acquired from the proximal thighs to the skull vertex. CT images were also acquired for attenuation correction and anatomic localization and performed without oral or IV contrast.     COMPARISON:  MRI prostate 04/28/2023     FINDINGS:  In the head and neck, there is physiologic uptake in the lacrimal and salivary glands, and there are no tracer avid lesions suspicious for malignancy.     In the chest, there are no tracer avid lesions suspicious for malignancy.     In the  abdomen and pelvis, there is physiologic distribution in the liver, spleen, bowel, and genitourinary tract.  There is a tracer avid lesion in the left aspect of the prostate with SUV max 36 (image 274).     There are no pathologically enlarged or tracer avid pelvic or retroperitoneal lymph nodes.     There is nonspecific uptake in the inguinal lymph nodes.     In the bones, there is physiologic uptake in the bone marrow, and there are no tracer avid lesions suspicious for malignancy.     Additional CT findings: Azygos lobe noted.  There is a 0.4 cm right pulmonary nodule (image 141).  Bibasilar dependent atelectasis.  Punctate left nephrolithiasis.  Osseous degenerative changes.     Impression:     In this patient with prostate cancer, there is a tracer avid lesion in the left aspect of the prostate.  No abnormal tracer uptake elsewhere to suggest metastatic disease.     Electronically signed by resident: Chiquita Herbert  Date:                                            12/01/2023  Time:                                           15:46     Electronically signed by: Sergio Sosa  Date:                                            12/01/2023  Time:                                           16:24  Assessment and Plan:   Prostate cancer  [place] 7 3+4 GG2 cT1c cN0 cM0; psa 14.9; unfavorable intermediate risk due to PSA progression     Enlarged prostate 45g    Erectile dysfunction, unspecified erectile dysfunction type  Cialis 20mg prn      Robotic prostatectomy and PLND 12/20  Consents done  Answered all questions  Reviewed preop instructions     preop appt with anesthesia today at 9am

## 2023-12-08 ENCOUNTER — OFFICE VISIT (OUTPATIENT)
Dept: UROLOGY | Facility: CLINIC | Age: 68
End: 2023-12-08
Payer: MEDICARE

## 2023-12-08 ENCOUNTER — HOSPITAL ENCOUNTER (OUTPATIENT)
Dept: PREADMISSION TESTING | Facility: OTHER | Age: 68
Discharge: HOME | End: 2023-12-08
Payer: MEDICARE

## 2023-12-08 ENCOUNTER — ANESTHESIA EVENT (OUTPATIENT)
Dept: SURGERY | Facility: OTHER | Age: 68
End: 2023-12-08
Payer: MEDICARE

## 2023-12-08 VITALS
SYSTOLIC BLOOD PRESSURE: 135 MMHG | HEART RATE: 64 BPM | HEIGHT: 71 IN | DIASTOLIC BLOOD PRESSURE: 89 MMHG | OXYGEN SATURATION: 95 % | BODY MASS INDEX: 25.8 KG/M2 | WEIGHT: 184.31 LBS

## 2023-12-08 VITALS
BODY MASS INDEX: 25.76 KG/M2 | OXYGEN SATURATION: 98 % | WEIGHT: 184 LBS | HEART RATE: 56 BPM | HEIGHT: 71 IN | DIASTOLIC BLOOD PRESSURE: 82 MMHG | SYSTOLIC BLOOD PRESSURE: 135 MMHG

## 2023-12-08 DIAGNOSIS — N40.0 ENLARGED PROSTATE: ICD-10-CM

## 2023-12-08 DIAGNOSIS — C61 PROSTATE CANCER: ICD-10-CM

## 2023-12-08 DIAGNOSIS — N52.9 ERECTILE DYSFUNCTION, UNSPECIFIED ERECTILE DYSFUNCTION TYPE: ICD-10-CM

## 2023-12-08 DIAGNOSIS — Z01.818 PREOP TESTING: Primary | ICD-10-CM

## 2023-12-08 DIAGNOSIS — C61 PROSTATE CANCER: Primary | ICD-10-CM

## 2023-12-08 LAB
ABO + RH BLD: NORMAL
ANION GAP SERPL CALC-SCNC: 7 MMOL/L (ref 8–16)
BASOPHILS # BLD AUTO: 0.06 K/UL (ref 0–0.2)
BASOPHILS NFR BLD: 1.8 % (ref 0–1.9)
BLD GP AB SCN CELLS X3 SERPL QL: NORMAL
BUN SERPL-MCNC: 9 MG/DL (ref 8–23)
CALCIUM SERPL-MCNC: 9.6 MG/DL (ref 8.7–10.5)
CHLORIDE SERPL-SCNC: 106 MMOL/L (ref 95–110)
CO2 SERPL-SCNC: 28 MMOL/L (ref 23–29)
CREAT SERPL-MCNC: 0.9 MG/DL (ref 0.5–1.4)
DIFFERENTIAL METHOD: ABNORMAL
EOSINOPHIL # BLD AUTO: 0.1 K/UL (ref 0–0.5)
EOSINOPHIL NFR BLD: 4 % (ref 0–8)
ERYTHROCYTE [DISTWIDTH] IN BLOOD BY AUTOMATED COUNT: 14.5 % (ref 11.5–14.5)
EST. GFR  (NO RACE VARIABLE): >60 ML/MIN/1.73 M^2
GLUCOSE SERPL-MCNC: 101 MG/DL (ref 70–110)
HCT VFR BLD AUTO: 45.2 % (ref 40–54)
HGB BLD-MCNC: 14.8 G/DL (ref 14–18)
IMM GRANULOCYTES # BLD AUTO: 0 K/UL (ref 0–0.04)
IMM GRANULOCYTES NFR BLD AUTO: 0 % (ref 0–0.5)
LYMPHOCYTES # BLD AUTO: 1.6 K/UL (ref 1–4.8)
LYMPHOCYTES NFR BLD: 47.4 % (ref 18–48)
MCH RBC QN AUTO: 29.7 PG (ref 27–31)
MCHC RBC AUTO-ENTMCNC: 32.7 G/DL (ref 32–36)
MCV RBC AUTO: 91 FL (ref 82–98)
MONOCYTES # BLD AUTO: 0.3 K/UL (ref 0.3–1)
MONOCYTES NFR BLD: 8.2 % (ref 4–15)
NEUTROPHILS # BLD AUTO: 1.3 K/UL (ref 1.8–7.7)
NEUTROPHILS NFR BLD: 38.6 % (ref 38–73)
NRBC BLD-RTO: 0 /100 WBC
PLATELET # BLD AUTO: 230 K/UL (ref 150–450)
PMV BLD AUTO: 9.2 FL (ref 9.2–12.9)
POTASSIUM SERPL-SCNC: 4.8 MMOL/L (ref 3.5–5.1)
RBC # BLD AUTO: 4.99 M/UL (ref 4.6–6.2)
SODIUM SERPL-SCNC: 141 MMOL/L (ref 136–145)
SPECIMEN OUTDATE: NORMAL
WBC # BLD AUTO: 3.29 K/UL (ref 3.9–12.7)

## 2023-12-08 PROCEDURE — 86901 BLOOD TYPING SEROLOGIC RH(D): CPT | Mod: HCNC | Performed by: UROLOGY

## 2023-12-08 PROCEDURE — 3008F PR BODY MASS INDEX (BMI) DOCUMENTED: ICD-10-PCS | Mod: HCNC,CPTII,S$GLB, | Performed by: UROLOGY

## 2023-12-08 PROCEDURE — 99215 OFFICE O/P EST HI 40 MIN: CPT | Mod: HCNC,S$GLB,, | Performed by: UROLOGY

## 2023-12-08 PROCEDURE — 93010 EKG 12-LEAD: ICD-10-PCS | Mod: HCNC,,, | Performed by: INTERNAL MEDICINE

## 2023-12-08 PROCEDURE — 3075F SYST BP GE 130 - 139MM HG: CPT | Mod: HCNC,CPTII,S$GLB, | Performed by: UROLOGY

## 2023-12-08 PROCEDURE — 3079F PR MOST RECENT DIASTOLIC BLOOD PRESSURE 80-89 MM HG: ICD-10-PCS | Mod: HCNC,CPTII,S$GLB, | Performed by: UROLOGY

## 2023-12-08 PROCEDURE — 36415 COLL VENOUS BLD VENIPUNCTURE: CPT | Mod: HCNC | Performed by: UROLOGY

## 2023-12-08 PROCEDURE — 87086 URINE CULTURE/COLONY COUNT: CPT | Mod: HCNC | Performed by: UROLOGY

## 2023-12-08 PROCEDURE — 1159F MED LIST DOCD IN RCRD: CPT | Mod: HCNC,CPTII,S$GLB, | Performed by: UROLOGY

## 2023-12-08 PROCEDURE — 3044F HG A1C LEVEL LT 7.0%: CPT | Mod: HCNC,CPTII,S$GLB, | Performed by: UROLOGY

## 2023-12-08 PROCEDURE — 93005 ELECTROCARDIOGRAM TRACING: CPT | Mod: HCNC

## 2023-12-08 PROCEDURE — 1159F PR MEDICATION LIST DOCUMENTED IN MEDICAL RECORD: ICD-10-PCS | Mod: HCNC,CPTII,S$GLB, | Performed by: UROLOGY

## 2023-12-08 PROCEDURE — 3079F DIAST BP 80-89 MM HG: CPT | Mod: HCNC,CPTII,S$GLB, | Performed by: UROLOGY

## 2023-12-08 PROCEDURE — 99215 PR OFFICE/OUTPT VISIT, EST, LEVL V, 40-54 MIN: ICD-10-PCS | Mod: HCNC,S$GLB,, | Performed by: UROLOGY

## 2023-12-08 PROCEDURE — 80048 BASIC METABOLIC PNL TOTAL CA: CPT | Mod: HCNC | Performed by: UROLOGY

## 2023-12-08 PROCEDURE — 93010 ELECTROCARDIOGRAM REPORT: CPT | Mod: HCNC,,, | Performed by: INTERNAL MEDICINE

## 2023-12-08 PROCEDURE — 3044F PR MOST RECENT HEMOGLOBIN A1C LEVEL <7.0%: ICD-10-PCS | Mod: HCNC,CPTII,S$GLB, | Performed by: UROLOGY

## 2023-12-08 PROCEDURE — 3075F PR MOST RECENT SYSTOLIC BLOOD PRESS GE 130-139MM HG: ICD-10-PCS | Mod: HCNC,CPTII,S$GLB, | Performed by: UROLOGY

## 2023-12-08 PROCEDURE — 85025 COMPLETE CBC W/AUTO DIFF WBC: CPT | Mod: HCNC | Performed by: UROLOGY

## 2023-12-08 PROCEDURE — 1126F PR PAIN SEVERITY QUANTIFIED, NO PAIN PRESENT: ICD-10-PCS | Mod: HCNC,CPTII,S$GLB, | Performed by: UROLOGY

## 2023-12-08 PROCEDURE — 3008F BODY MASS INDEX DOCD: CPT | Mod: HCNC,CPTII,S$GLB, | Performed by: UROLOGY

## 2023-12-08 PROCEDURE — 1126F AMNT PAIN NOTED NONE PRSNT: CPT | Mod: HCNC,CPTII,S$GLB, | Performed by: UROLOGY

## 2023-12-08 RX ORDER — TADALAFIL 20 MG/1
20 TABLET ORAL
Qty: 30 TABLET | Refills: 11 | Status: SHIPPED | OUTPATIENT
Start: 2023-12-08 | End: 2024-01-07

## 2023-12-08 RX ORDER — LIDOCAINE HYDROCHLORIDE 10 MG/ML
1 INJECTION, SOLUTION EPIDURAL; INFILTRATION; INTRACAUDAL; PERINEURAL ONCE
Status: CANCELLED | OUTPATIENT
Start: 2023-12-08 | End: 2023-12-08

## 2023-12-08 RX ORDER — AMOXICILLIN 500 MG
CAPSULE ORAL DAILY
COMMUNITY

## 2023-12-08 RX ORDER — ACETAMINOPHEN 500 MG
1000 TABLET ORAL
Status: CANCELLED | OUTPATIENT
Start: 2023-12-08 | End: 2023-12-08

## 2023-12-08 NOTE — DISCHARGE INSTRUCTIONS
Information to Prepare you for your Surgery    PRE-ADMIT TESTING -  [phone]    [address] [facility]          Your surgery has been scheduled at [facility]. We are pleased to have the opportunity to serve you. For Further Information please call [phone].    On the day of surgery please report to the Information Desk on the 1st floor.    CONTACT YOUR PHYSICIAN'S OFFICE THE DAY PRIOR TO YOUR SURGERY TO OBTAIN YOUR ARRIVAL TIME.     The evening before surgery do not eat anything after 9 p.m. ( this includes hard candy, chewing gum and mints).  You may only have GATORADE, POWERADE AND WATER  from 9 p.m. until you leave your home.   DO NOT DRINK ANY LIQUIDS ON THE WAY TO THE HOSPITAL.      Why does your anesthesiologist allow you to drink Gatorade/Powerade before surgery?  Gatorade/Powerade helps to increase your comfort before surgery and to decrease your nausea after surgery. The carbohydrates in Gatorade/Powerade help reduce your body's stress response to surgery.  If you are a diabetic-drink only water prior to surgery.    Outpatient Surgery- May allow 2 adult (18 and older) Support Persons (1 being the designated ) for all surgical/procedural patients. A breastfeeding mother will be allowed her infant and 2 adult Support Persons. No one under the age of 18 will be allowed in the building.      SPECIAL MEDICATION INSTRUCTIONS: TAKE medications checked off by the Anesthesiologist on your Medication List.    Angiogram Patients: Take medications as instructed by your physician, including aspirin.     Surgery Patients:    If you take ASPIRIN - Your PHYSICIAN/SURGEON will need to inform you IF/OR when you need to stop taking aspirin prior to your surgery.     The week prior to surgery do not ot take any medications containing IBUPROFEN or NSAIDS ( Advil, Motrin, Goodys, BC, Aleve, Naproxen etc) If you are not sure if you should take a medicine  please call your surgeon's office.  Ok to take Tylenol    Do Not Wear any make-up (especially eye make-up) to surgery. Please remove any false eyelashes or eyelash extensions. If you arrive the day of surgery with makeup/eyelashes on you will be required to remove prior to surgery. (There is a risk of corneal abrasions if eye makeup/eyelash extensions are not removed)      Leave all valuables at home.   Do Not wear any jewelry or watches, including any metal in body piercings. Jewelry must be removed prior to coming to the hospital.  There is a possibility that rings that are unable to be removed may be cut off if they are on the surgical extremity.    Please remove all hair extensions, wigs, clips and any other metal accessories/ ornaments from your hair.  These items may pose a flammable/fire risk in Surgery and must be removed.    Do not shave your surgical area at least 5 days prior to your surgery. The surgical prep will be performed at the hospital according to Infection Control regulations.    Contact Lens must be removed before surgery. Either do not wear the contact lens or bring a case and solution for storage.  Please bring a container for eyeglasses or dentures as required.  Bring any paperwork your physician has provided, such as consent forms,  history and physicals, doctor's orders, etc.   Bring comfortable clothes that are loose fitting to wear upon discharge. Take into consideration the type of surgery being performed.  Maintain your diet as advised per your physician the day prior to surgery.      Adequate rest the night before surgery is advised.   Park in the Parking lot behind the hospital or in the [facility] across the street from the parking lot. Parking is complimentary.  If you will be discharged the same day as your procedure, please arrange for a responsible adult to drive you home or to accompany you if traveling by taxi.   YOU WILL NOT BE PERMITTED TO DRIVE OR TO LEAVE THE  HOSPITAL ALONE AFTER SURGERY.   If you are being discharged the same day, it is strongly recommended that you arrange for someone to remain with you for the first 24 hrs following your surgery.    The Surgeon will speak to your family/visitor after your surgery regarding the outcome of your surgery and post op care.  The Surgeon may speak to you after your surgery, but there is a possibility you may not remember the details.  Please check with your family members regarding the conversation with the Surgeon.    We strongly recommend whoever is bringing you home be present for discharge instructions.  This will ensure a thorough understanding for your post op home care.          Thank you for your cooperation.  The Staff of [facility].            Bathing Instructions with Hibiclens    Shower the evening before and morning of your procedure with Chlorhexidine (Hibiclens)  do not use Chlorhexidine on your face or genitals. Do not get in your eyes.  Wash your face with water and your regular face wash/soap  Use your regular shampoo  Apply Chlorhexidine (Hibiclens) directly on your skin or on a wet washcloth and wash gently. When showering: Move away from the shower stream when applying Chlorhexidine (Hibiclens) to avoid rinsing off too soon.  Rinse thoroughly with warm water  Do not dilute Chlorhexidine (Hibiclens)   Dry off as usual, do not use any deodorant, powder, body lotions, perfume, after shave or cologne.

## 2023-12-08 NOTE — ANESTHESIA PREPROCEDURE EVALUATION
12/08/2023  Herbie Naqvi is a 68 y.o., male.      Pre-op Assessment    I have reviewed the Patient Summary Reports.     I have reviewed the Nursing Notes. I have reviewed the NPO Status.   I have reviewed the Medications.     Review of Systems  Anesthesia Hx:  No previous Anesthesia   History of prior surgery of interest to airway management or planning:          Denies Family Hx of Anesthesia complications.    Denies Personal Hx of Anesthesia complications.                    Social:  Non-Smoker       Hematology/Oncology:       -- Anemia:                  Current/Recent Cancer.            Oncology Comments: Prostate ca     EENT/Dental:  EENT/Dental Normal           Cardiovascular:  Cardiovascular Normal                                            Pulmonary:  Pulmonary Normal                       Renal/:  Renal/ Normal                 Hepatic/GI:  Hepatic/GI Normal                 Musculoskeletal:  Arthritis               Neurological:  Neurology Normal                                      Endocrine:  Endocrine Normal    Pre DM        Dermatological:  Skin Normal    Psych:  Psychiatric Normal                    Physical Exam  General: Cooperative, Alert and Oriented    Airway:  Mallampati: II   Mouth Opening: Normal  Neck ROM: Normal ROM    Dental:  Dentures      Anesthesia Plan  Type of Anesthesia, risks & benefits discussed:    Anesthesia Type: Gen ETT  Intra-op Monitoring Plan: Standard ASA Monitors  Post Op Pain Control Plan: multimodal analgesia and peripheral nerve block  Induction:  IV  Airway Plan: Video  Informed Consent: Informed consent signed with the Patient and all parties understand the risks and agree with anesthesia plan.  All questions answered.   ASA Score: 2  Anesthesia Plan Notes: Block discussed  Dr Miller Labs    Ready For Surgery From Anesthesia Perspective.     .

## 2023-12-10 LAB — BACTERIA UR CULT: NO GROWTH

## 2023-12-19 ENCOUNTER — TELEPHONE (OUTPATIENT)
Dept: UROLOGY | Facility: CLINIC | Age: 68
End: 2023-12-19
Payer: MEDICARE

## 2023-12-20 ENCOUNTER — HOSPITAL ENCOUNTER (OUTPATIENT)
Facility: OTHER | Age: 68
LOS: 1 days | Discharge: HOME | End: 2023-12-21
Attending: UROLOGY | Admitting: UROLOGY
Payer: MEDICARE

## 2023-12-20 ENCOUNTER — ANESTHESIA (OUTPATIENT)
Dept: SURGERY | Facility: OTHER | Age: 68
End: 2023-12-20
Payer: MEDICARE

## 2023-12-20 DIAGNOSIS — C61 PROSTATE CANCER: Primary | ICD-10-CM

## 2023-12-20 PROCEDURE — 63600175 PHARM REV CODE 636 W HCPCS: Mod: HCNC | Performed by: STUDENT IN AN ORGANIZED HEALTH CARE EDUCATION/TRAINING PROGRAM

## 2023-12-20 PROCEDURE — 25000003 PHARM REV CODE 250: Mod: HCNC | Performed by: UROLOGY

## 2023-12-20 PROCEDURE — 63600175 PHARM REV CODE 636 W HCPCS: Mod: HCNC

## 2023-12-20 PROCEDURE — D9220A PRA ANESTHESIA: ICD-10-PCS | Mod: HCNC,ANES,, | Performed by: ANESTHESIOLOGY

## 2023-12-20 PROCEDURE — D9220A PRA ANESTHESIA: Mod: HCNC,CRNA,, | Performed by: NURSE ANESTHETIST, CERTIFIED REGISTERED

## 2023-12-20 PROCEDURE — 38571 LAPAROSCOPY LYMPHADENECTOMY: CPT | Mod: 51,,, | Performed by: UROLOGY

## 2023-12-20 PROCEDURE — A4216 STERILE WATER/SALINE, 10 ML: HCPCS | Mod: HCNC | Performed by: NURSE ANESTHETIST, CERTIFIED REGISTERED

## 2023-12-20 PROCEDURE — 71000039 HC RECOVERY, EACH ADD'L HOUR: Mod: HCNC | Performed by: UROLOGY

## 2023-12-20 PROCEDURE — 88305 TISSUE EXAM BY PATHOLOGIST: CPT | Mod: 26,HCNC,, | Performed by: PATHOLOGY

## 2023-12-20 PROCEDURE — 25000003 PHARM REV CODE 250: Mod: HCNC | Performed by: ANESTHESIOLOGY

## 2023-12-20 PROCEDURE — 64488 TAP BLOCK BI INJECTION: CPT | Mod: HCNC | Performed by: ANESTHESIOLOGY

## 2023-12-20 PROCEDURE — 25000003 PHARM REV CODE 250: Mod: HCNC | Performed by: NURSE ANESTHETIST, CERTIFIED REGISTERED

## 2023-12-20 PROCEDURE — 55866 LAPS SURG PRST8ECT RPBIC RAD: CPT | Mod: ,,, | Performed by: UROLOGY

## 2023-12-20 PROCEDURE — 88307 TISSUE EXAM BY PATHOLOGIST: CPT | Mod: 59,HCNC | Performed by: PATHOLOGY

## 2023-12-20 PROCEDURE — 88305 TISSUE EXAM BY PATHOLOGIST: CPT | Mod: HCNC | Performed by: PATHOLOGY

## 2023-12-20 PROCEDURE — 88342 IMHCHEM/IMCYTCHM 1ST ANTB: CPT | Mod: 59,HCNC | Performed by: PATHOLOGY

## 2023-12-20 PROCEDURE — 63600175 PHARM REV CODE 636 W HCPCS: Mod: HCNC | Performed by: ANESTHESIOLOGY

## 2023-12-20 PROCEDURE — 36000712 HC OR TIME LEV V 1ST 15 MIN: Mod: HCNC | Performed by: UROLOGY

## 2023-12-20 PROCEDURE — 63600175 PHARM REV CODE 636 W HCPCS: Mod: HCNC | Performed by: NURSE ANESTHETIST, CERTIFIED REGISTERED

## 2023-12-20 PROCEDURE — 88309 TISSUE EXAM BY PATHOLOGIST: CPT | Mod: HCNC | Performed by: PATHOLOGY

## 2023-12-20 PROCEDURE — D9220A PRA ANESTHESIA: Mod: HCNC,ANES,, | Performed by: ANESTHESIOLOGY

## 2023-12-20 PROCEDURE — 88342 IMHCHEM/IMCYTCHM 1ST ANTB: CPT | Mod: 26,HCNC,, | Performed by: PATHOLOGY

## 2023-12-20 PROCEDURE — 37000009 HC ANESTHESIA EA ADD 15 MINS: Mod: HCNC | Performed by: UROLOGY

## 2023-12-20 PROCEDURE — D9220A PRA ANESTHESIA: ICD-10-PCS | Mod: HCNC,CRNA,, | Performed by: NURSE ANESTHETIST, CERTIFIED REGISTERED

## 2023-12-20 PROCEDURE — 51700 IRRIGATION OF BLADDER: CPT | Mod: HCNC

## 2023-12-20 PROCEDURE — 88309 TISSUE EXAM BY PATHOLOGIST: CPT | Mod: 26,HCNC,, | Performed by: PATHOLOGY

## 2023-12-20 PROCEDURE — 63600175 PHARM REV CODE 636 W HCPCS: Mod: HCNC | Performed by: UROLOGY

## 2023-12-20 PROCEDURE — C9290 INJ, BUPIVACAINE LIPOSOME: HCPCS | Mod: HCNC | Performed by: ANESTHESIOLOGY

## 2023-12-20 PROCEDURE — 27201423 OPTIME MED/SURG SUP & DEVICES STERILE SUPPLY: Mod: HCNC | Performed by: UROLOGY

## 2023-12-20 PROCEDURE — 36000713 HC OR TIME LEV V EA ADD 15 MIN: Mod: HCNC | Performed by: UROLOGY

## 2023-12-20 PROCEDURE — 71000033 HC RECOVERY, INTIAL HOUR: Mod: HCNC | Performed by: UROLOGY

## 2023-12-20 PROCEDURE — P9045 ALBUMIN (HUMAN), 5%, 250 ML: HCPCS | Mod: JZ,JG,HCNC | Performed by: NURSE ANESTHETIST, CERTIFIED REGISTERED

## 2023-12-20 PROCEDURE — 88307 TISSUE EXAM BY PATHOLOGIST: CPT | Mod: 26,HCNC,, | Performed by: PATHOLOGY

## 2023-12-20 PROCEDURE — 37000008 HC ANESTHESIA 1ST 15 MINUTES: Mod: HCNC | Performed by: UROLOGY

## 2023-12-20 RX ORDER — BUPIVACAINE HYDROCHLORIDE 2.5 MG/ML
INJECTION, SOLUTION EPIDURAL; INFILTRATION; INTRACAUDAL
Status: COMPLETED | OUTPATIENT
Start: 2023-12-20 | End: 2023-12-20

## 2023-12-20 RX ORDER — KETAMINE HCL IN 0.9 % NACL 50 MG/5 ML
SYRINGE (ML) INTRAVENOUS
Status: DISCONTINUED | OUTPATIENT
Start: 2023-12-20 | End: 2023-12-20

## 2023-12-20 RX ORDER — HEPARIN SODIUM 5000 [USP'U]/ML
5000 INJECTION, SOLUTION INTRAVENOUS; SUBCUTANEOUS EVERY 8 HOURS
Status: DISCONTINUED | OUTPATIENT
Start: 2023-12-20 | End: 2023-12-20

## 2023-12-20 RX ORDER — HYDROMORPHONE HYDROCHLORIDE 2 MG/ML
0.4 INJECTION, SOLUTION INTRAMUSCULAR; INTRAVENOUS; SUBCUTANEOUS EVERY 5 MIN PRN
Status: DISCONTINUED | OUTPATIENT
Start: 2023-12-20 | End: 2023-12-20 | Stop reason: HOSPADM

## 2023-12-20 RX ORDER — PROPOFOL 10 MG/ML
VIAL (ML) INTRAVENOUS
Status: DISCONTINUED | OUTPATIENT
Start: 2023-12-20 | End: 2023-12-20

## 2023-12-20 RX ORDER — LIDOCAINE HYDROCHLORIDE 10 MG/ML
1 INJECTION, SOLUTION EPIDURAL; INFILTRATION; INTRACAUDAL; PERINEURAL ONCE
Status: DISCONTINUED | OUTPATIENT
Start: 2023-12-20 | End: 2023-12-20 | Stop reason: HOSPADM

## 2023-12-20 RX ORDER — FENTANYL CITRATE 50 UG/ML
INJECTION, SOLUTION INTRAMUSCULAR; INTRAVENOUS
Status: DISCONTINUED | OUTPATIENT
Start: 2023-12-20 | End: 2023-12-20

## 2023-12-20 RX ORDER — DEXAMETHASONE SODIUM PHOSPHATE 4 MG/ML
INJECTION, SOLUTION INTRA-ARTICULAR; INTRALESIONAL; INTRAMUSCULAR; INTRAVENOUS; SOFT TISSUE
Status: DISCONTINUED | OUTPATIENT
Start: 2023-12-20 | End: 2023-12-20

## 2023-12-20 RX ORDER — ONDANSETRON 8 MG/1
8 TABLET, ORALLY DISINTEGRATING ORAL EVERY 8 HOURS PRN
Status: DISCONTINUED | OUTPATIENT
Start: 2023-12-20 | End: 2023-12-21 | Stop reason: HOSPADM

## 2023-12-20 RX ORDER — ALBUMIN HUMAN 50 G/1000ML
SOLUTION INTRAVENOUS
Status: DISCONTINUED | OUTPATIENT
Start: 2023-12-20 | End: 2023-12-20

## 2023-12-20 RX ORDER — OXYCODONE HYDROCHLORIDE 5 MG/1
5 TABLET ORAL
Status: DISCONTINUED | OUTPATIENT
Start: 2023-12-20 | End: 2023-12-20 | Stop reason: HOSPADM

## 2023-12-20 RX ORDER — KETOROLAC TROMETHAMINE 30 MG/ML
INJECTION, SOLUTION INTRAMUSCULAR; INTRAVENOUS
Status: DISCONTINUED | OUTPATIENT
Start: 2023-12-20 | End: 2023-12-20

## 2023-12-20 RX ORDER — ONDANSETRON 2 MG/ML
INJECTION INTRAMUSCULAR; INTRAVENOUS
Status: DISCONTINUED | OUTPATIENT
Start: 2023-12-20 | End: 2023-12-20

## 2023-12-20 RX ORDER — FUROSEMIDE 10 MG/ML
INJECTION INTRAMUSCULAR; INTRAVENOUS
Status: DISCONTINUED | OUTPATIENT
Start: 2023-12-20 | End: 2023-12-20

## 2023-12-20 RX ORDER — ROCURONIUM BROMIDE 10 MG/ML
INJECTION, SOLUTION INTRAVENOUS
Status: DISCONTINUED | OUTPATIENT
Start: 2023-12-20 | End: 2023-12-20

## 2023-12-20 RX ORDER — LIDOCAINE HYDROCHLORIDE 20 MG/ML
INJECTION INTRAVENOUS
Status: DISCONTINUED | OUTPATIENT
Start: 2023-12-20 | End: 2023-12-20

## 2023-12-20 RX ORDER — SODIUM CHLORIDE 0.9 % (FLUSH) 0.9 %
3 SYRINGE (ML) INJECTION
Status: DISCONTINUED | OUTPATIENT
Start: 2023-12-20 | End: 2023-12-20 | Stop reason: HOSPADM

## 2023-12-20 RX ORDER — ACETAMINOPHEN 500 MG
1000 TABLET ORAL
Status: COMPLETED | OUTPATIENT
Start: 2023-12-20 | End: 2023-12-20

## 2023-12-20 RX ORDER — PROMETHAZINE HYDROCHLORIDE 12.5 MG/1
25 TABLET ORAL EVERY 6 HOURS PRN
Status: DISCONTINUED | OUTPATIENT
Start: 2023-12-20 | End: 2023-12-21 | Stop reason: HOSPADM

## 2023-12-20 RX ORDER — MEPERIDINE HYDROCHLORIDE 25 MG/ML
12.5 INJECTION INTRAMUSCULAR; INTRAVENOUS; SUBCUTANEOUS ONCE AS NEEDED
Status: DISCONTINUED | OUTPATIENT
Start: 2023-12-20 | End: 2023-12-20 | Stop reason: HOSPADM

## 2023-12-20 RX ORDER — PROCHLORPERAZINE EDISYLATE 5 MG/ML
5 INJECTION INTRAMUSCULAR; INTRAVENOUS EVERY 30 MIN PRN
Status: DISCONTINUED | OUTPATIENT
Start: 2023-12-20 | End: 2023-12-20 | Stop reason: HOSPADM

## 2023-12-20 RX ORDER — SODIUM CHLORIDE, SODIUM LACTATE, POTASSIUM CHLORIDE, CALCIUM CHLORIDE 600; 310; 30; 20 MG/100ML; MG/100ML; MG/100ML; MG/100ML
INJECTION, SOLUTION INTRAVENOUS CONTINUOUS
Status: DISCONTINUED | OUTPATIENT
Start: 2023-12-20 | End: 2023-12-21

## 2023-12-20 RX ORDER — ACETAMINOPHEN 650 MG/1
650 SUPPOSITORY RECTAL EVERY 4 HOURS
Status: DISCONTINUED | OUTPATIENT
Start: 2023-12-20 | End: 2023-12-20

## 2023-12-20 RX ORDER — CEFAZOLIN SODIUM 1 G/3ML
2 INJECTION, POWDER, FOR SOLUTION INTRAMUSCULAR; INTRAVENOUS
Status: COMPLETED | OUTPATIENT
Start: 2023-12-20 | End: 2023-12-20

## 2023-12-20 RX ORDER — MIDAZOLAM HYDROCHLORIDE 1 MG/ML
INJECTION, SOLUTION INTRAMUSCULAR; INTRAVENOUS
Status: DISCONTINUED | OUTPATIENT
Start: 2023-12-20 | End: 2023-12-20

## 2023-12-20 RX ORDER — ACETAMINOPHEN 325 MG/1
650 TABLET ORAL 4 TIMES DAILY
Status: DISCONTINUED | OUTPATIENT
Start: 2023-12-20 | End: 2023-12-21 | Stop reason: HOSPADM

## 2023-12-20 RX ORDER — HEPARIN SODIUM 5000 [USP'U]/ML
5000 INJECTION, SOLUTION INTRAVENOUS; SUBCUTANEOUS
Status: COMPLETED | OUTPATIENT
Start: 2023-12-20 | End: 2023-12-20

## 2023-12-20 RX ORDER — OXYCODONE HYDROCHLORIDE 5 MG/1
5 TABLET ORAL EVERY 4 HOURS PRN
Status: DISCONTINUED | OUTPATIENT
Start: 2023-12-20 | End: 2023-12-21 | Stop reason: HOSPADM

## 2023-12-20 RX ADMIN — ONDANSETRON HYDROCHLORIDE 4 MG: 2 INJECTION INTRAMUSCULAR; INTRAVENOUS at 10:12

## 2023-12-20 RX ADMIN — SUGAMMADEX 200 MG: 100 INJECTION, SOLUTION INTRAVENOUS at 11:12

## 2023-12-20 RX ADMIN — SODIUM CHLORIDE 0.5 MCG/KG/HR: 9 INJECTION, SOLUTION INTRAMUSCULAR; INTRAVENOUS; SUBCUTANEOUS at 07:12

## 2023-12-20 RX ADMIN — HEPARIN SODIUM 5000 UNITS: 5000 INJECTION INTRAVENOUS; SUBCUTANEOUS at 05:12

## 2023-12-20 RX ADMIN — ROCURONIUM BROMIDE 25 MG: 10 SOLUTION INTRAVENOUS at 09:12

## 2023-12-20 RX ADMIN — KETOROLAC TROMETHAMINE 15 MG: 30 INJECTION, SOLUTION INTRAMUSCULAR; INTRAVENOUS at 10:12

## 2023-12-20 RX ADMIN — ACETAMINOPHEN 650 MG: 325 TABLET, FILM COATED ORAL at 02:12

## 2023-12-20 RX ADMIN — PHENYLEPHRINE HYDROCHLORIDE 0.5 MCG/KG/MIN: 10 INJECTION INTRAVENOUS at 07:12

## 2023-12-20 RX ADMIN — PROPOFOL 200 MG: 10 INJECTION, EMULSION INTRAVENOUS at 07:12

## 2023-12-20 RX ADMIN — GLYCOPYRROLATE 0.2 MG: 0.2 INJECTION, SOLUTION INTRAMUSCULAR; INTRAVITREAL at 07:12

## 2023-12-20 RX ADMIN — LIDOCAINE HYDROCHLORIDE 100 MG: 20 INJECTION, SOLUTION INTRAVENOUS at 07:12

## 2023-12-20 RX ADMIN — HYDROMORPHONE HYDROCHLORIDE 0.4 MG: 2 INJECTION INTRAMUSCULAR; INTRAVENOUS; SUBCUTANEOUS at 11:12

## 2023-12-20 RX ADMIN — ROCURONIUM BROMIDE 50 MG: 10 SOLUTION INTRAVENOUS at 07:12

## 2023-12-20 RX ADMIN — SODIUM CHLORIDE, SODIUM LACTATE, POTASSIUM CHLORIDE, AND CALCIUM CHLORIDE: .6; .31; .03; .02 INJECTION, SOLUTION INTRAVENOUS at 09:12

## 2023-12-20 RX ADMIN — OXYCODONE HYDROCHLORIDE 5 MG: 5 TABLET ORAL at 11:12

## 2023-12-20 RX ADMIN — CARBOXYMETHYLCELLULOSE SODIUM 2 DROP: 2.5 SOLUTION/ DROPS OPHTHALMIC at 07:12

## 2023-12-20 RX ADMIN — ROCURONIUM BROMIDE 25 MG: 10 SOLUTION INTRAVENOUS at 07:12

## 2023-12-20 RX ADMIN — ALBUMIN (HUMAN) 250 ML: 12.5 SOLUTION INTRAVENOUS at 07:12

## 2023-12-20 RX ADMIN — FUROSEMIDE 5 MG: 10 INJECTION, SOLUTION INTRAMUSCULAR; INTRAVENOUS at 10:12

## 2023-12-20 RX ADMIN — MIDAZOLAM HYDROCHLORIDE 2 MG: 1 INJECTION, SOLUTION INTRAMUSCULAR; INTRAVENOUS at 06:12

## 2023-12-20 RX ADMIN — BUPIVACAINE HYDROCHLORIDE 40 ML: 2.5 INJECTION, SOLUTION EPIDURAL; INFILTRATION; INTRACAUDAL; PERINEURAL at 06:12

## 2023-12-20 RX ADMIN — PROPOFOL 50 MG: 10 INJECTION, EMULSION INTRAVENOUS at 07:12

## 2023-12-20 RX ADMIN — ROCURONIUM BROMIDE 25 MG: 10 SOLUTION INTRAVENOUS at 08:12

## 2023-12-20 RX ADMIN — Medication 30 MG: at 07:12

## 2023-12-20 RX ADMIN — ACETAMINOPHEN 1000 MG: 500 TABLET ORAL at 05:12

## 2023-12-20 RX ADMIN — Medication 20 MG: at 07:12

## 2023-12-20 RX ADMIN — CEFAZOLIN 2 G: 330 INJECTION, POWDER, FOR SOLUTION INTRAMUSCULAR; INTRAVENOUS at 07:12

## 2023-12-20 RX ADMIN — HEPARIN SODIUM 5000 UNITS: 5000 INJECTION INTRAVENOUS; SUBCUTANEOUS at 02:12

## 2023-12-20 RX ADMIN — ACETAMINOPHEN 650 MG: 325 TABLET, FILM COATED ORAL at 05:12

## 2023-12-20 RX ADMIN — BUPIVACAINE 20 ML: 13.3 INJECTION, SUSPENSION, LIPOSOMAL INFILTRATION at 06:12

## 2023-12-20 RX ADMIN — SODIUM CHLORIDE, POTASSIUM CHLORIDE, SODIUM LACTATE AND CALCIUM CHLORIDE: 600; 310; 30; 20 INJECTION, SOLUTION INTRAVENOUS at 01:12

## 2023-12-20 RX ADMIN — FENTANYL CITRATE 100 MCG: 50 INJECTION, SOLUTION INTRAMUSCULAR; INTRAVENOUS at 06:12

## 2023-12-20 RX ADMIN — DEXAMETHASONE SODIUM PHOSPHATE 8 MG: 4 INJECTION, SOLUTION INTRAMUSCULAR; INTRAVENOUS at 07:12

## 2023-12-20 RX ADMIN — SODIUM CHLORIDE, SODIUM LACTATE, POTASSIUM CHLORIDE, AND CALCIUM CHLORIDE: .6; .31; .03; .02 INJECTION, SOLUTION INTRAVENOUS at 06:12

## 2023-12-20 RX ADMIN — ACETAMINOPHEN 650 MG: 325 TABLET, FILM COATED ORAL at 09:12

## 2023-12-20 NOTE — ANESTHESIA PROCEDURE NOTES
Peripheral Block    Patient location during procedure: pre-op   Block not for primary anesthetic.  Reason for block: at surgeon's request and post-op pain management   Post-op Pain Location: abdominal wall pain   Timeout: 12/20/2023 6:00 AM     Staffing  Authorizing Provider: Kendall Smith MD  Performing Provider: Kendall Smith MD    Staffing  Performed by: Kendall Smith MD  Authorized by: Kendall Smith MD    Preanesthetic Checklist  Completed: patient identified, IV checked, site marked, risks and benefits discussed, surgical consent, monitors and equipment checked, pre-op evaluation and timeout performed  Peripheral Block  Patient position: supine  Prep: ChloraPrep  Patient monitoring: cardiac monitor, heart rate, continuous pulse ox, continuous capnometry and frequent blood pressure checks  Block type: TAP  Laterality: bilateral  Injection technique: single shot  Needle  Needle type: Stimuplex   Needle gauge: 21 G  Needle length: 4 in  Needle localization: ultrasound guidance   -ultrasound image captured on disc.  Assessment  Injection assessment: negative aspiration, negative parasthesia and local visualized surrounding nerve  Paresthesia pain: none  Heart rate change: no  Slow fractionated injection: yes  Pain Tolerance: comfortable throughout block and no complaints  Medications:    Medications: bupivacaine (pf) (MARCAINE) injection 0.25% - Perineural   40 mL - 12/20/2023 6:05:00 AM    Additional Notes  VSS.  [facility] RN monitoring vitals throughout procedure.  Patient tolerated procedure well.

## 2023-12-20 NOTE — ANESTHESIA POSTPROCEDURE EVALUATION
Anesthesia Post Evaluation    Patient: Herbie Naqvi    Procedure(s) Performed: Procedure(s) (LRB):  ROBOTIC PROSTATECTOMY (N/A)    Final Anesthesia Type: general      Patient location during evaluation: PACU  Patient participation: Yes- Able to Participate  Level of consciousness: awake and alert  Post-procedure vital signs: reviewed and stable  Pain management: adequate  Airway patency: patent  ANA MARIA mitigation strategies: Extubation while patient is awake  PONV status at discharge: No PONV  Anesthetic complications: no      Cardiovascular status: hemodynamically stable  Respiratory status: unassisted  Hydration status: euvolemic  Follow-up not needed.              Vitals Value Taken Time   /72 12/20/23 1252   Temp 36.4 °C (97.5 °F) 12/20/23 1252   Pulse 56 12/20/23 1252   Resp 12 12/20/23 1252   SpO2 100 % 12/20/23 1252         Event Time   Out of Recovery 12:38:00         Pain/Maribell Score: Pain Rating Prior to Med Admin: 6 (12/20/2023 11:58 AM)  Maribell Score: 9 (12/20/2023 12:20 PM)

## 2023-12-20 NOTE — CONSULTS
[facility] Surg [address] Medicine  Consult Note    Patient Name: Herbie Naqvi  MRN: [id number]  Admission Date: 12/20/2023  Hospital Length of Stay: 1 days  Attending Physician: Armani Miller MD   Primary Care Provider: Jake Goodrich MD           Patient information was obtained from patient, past medical records, and ER records.     Consults  Subjective:     Principal Problem: Prostate cancer    Chief Complaint: No chief complaint on file.       HPI: Pt with PMH of prostate cancer s/p Robotic-assisted laparoscopic prostatectomy and bilateral pelvic lymph node dissection 12/20. Hospital medicine consulted for co management.  Patient with no issues currently.     Past Medical History:   Diagnosis Date    Prostate cancer        Past Surgical History:   Procedure Laterality Date    COLONOSCOPY N/A 11/30/2017    Procedure: COLONOSCOPY;  Surgeon: John Roman MD;  Location: [facility];  Service: Endoscopy;  Laterality: N/A;  colonoscopy confirmed    wrist broke       rt side wrist       Review of patient's allergies indicates:  No Known Allergies    No current facility-administered medications on file prior to encounter.     Current Outpatient Medications on File Prior to Encounter   Medication Sig    latanoprost 0.005 % ophthalmic solution Place 1 drop into both eyes once daily.    meloxicam (MOBIC) 15 MG tablet Take 1 tablet (15 mg total) by mouth once daily.    tamsulosin (FLOMAX) 0.4 mg Cap Take 1 capsule by mouth once daily     Family History       Problem Relation (Age of Onset)    Diabetes Mother    Heart disease Father    Hyperlipidemia Father          Tobacco Use    Smoking status: Former     Types: Cigarettes     Start date: 3/25/2014    Smokeless tobacco: Never   Substance and Sexual Activity    Alcohol use: No     Comment: former drinker social stop drinking    Drug use: No    Sexual activity: Yes     Partners: Female     Birth control/protection: None     Comment:       Review of Systems   Constitutional:  Negative for fever.   Respiratory:  Negative for shortness of breath.    Cardiovascular:  Negative for chest pain and leg swelling.   Gastrointestinal:  Negative for abdominal pain.   Genitourinary:  Positive for frequency.        Nocturia    Psychiatric/Behavioral:  Negative for agitation and confusion.      Objective:     Vital Signs (Most Recent):  Temp: 97.6 °F (36.4 °C) (12/20/23 1528)  Pulse: 66 (12/20/23 1528)  Resp: 18 (12/20/23 1528)  BP: 122/77 (12/20/23 1528)  SpO2: (!) 92 % (12/20/23 1528) Vital Signs (24h Range):  Temp:  [97.4 °F (36.3 °C)-97.7 °F (36.5 °C)] 97.6 °F (36.4 °C)  Pulse:  [56-71] 66  Resp:  [12-18] 18  SpO2:  [92 %-100 %] 92 %  BP: (108-132)/(69-82) 122/77     Weight: 86.5 kg (190 lb 11.2 oz)  Body mass index is 26.6 kg/m².     Physical Exam  Constitutional:       General: He is not in acute distress.  Pulmonary:      Effort: No respiratory distress.      Breath sounds: No wheezing.   Abdominal:      General: There is no distension.   Musculoskeletal:      Right lower leg: No edema.      Left lower leg: No edema.   Neurological:      General: No focal deficit present.      Mental Status: He is oriented to person, place, and time.          Significant Labs: All pertinent labs within the past 24 hours have been reviewed.    Significant Imaging: I have reviewed all pertinent imaging results/findings within the past 24 hours.  Assessment/Plan:     * Prostate cancer  Pt with PMH of prostate cancer s/p Robotic-assisted laparoscopic prostatectomy and bilateral pelvic lymph node dissection 12/20. Hospital medicine consulted for co management.  Patient with no issues currently.       Prediabetes  Not on home medications.      Primary osteoarthritis of right knee  On meloxicam at home. Pt says he does not needed currently.        VTE Risk Mitigation (From admission, onward)      None                Thank you for your consult. I will follow-up with patient.  Please contact us if you have any additional questions.    Fidel Capellan MD  Department of Hospital Medicine   [facility] ([address])

## 2023-12-20 NOTE — ANESTHESIA PROCEDURE NOTES
Intubation    Date/Time: 12/20/2023 7:10 AM    Performed by: Delia Corey CRNA  Authorized by: Edgardo Grimaldo MD    Intubation:     Induction:  Intravenous    Intubated:  Postinduction    Mask Ventilation:  Easy mask    Attempts:  1    Attempted By:  CRNA    Method of Intubation:  Video laryngoscopy    Blade:  Lovett 4    Laryngeal View Grade: Grade I - full view of cords      Difficult Airway Encountered?: No      Complications:  None    Airway Device:  Oral endotracheal tube    Airway Device Size:  8.0    Style/Cuff Inflation:  Cuffed    Tube secured:  22    Secured at:  The lips    Placement Verified By:  Capnometry    Complicating Factors:  None    Findings Post-Intubation:  Atraumatic/condition of teeth unchanged and BS equal bilateral

## 2023-12-20 NOTE — TRANSFER OF CARE
Anesthesia Transfer of Care Note    Patient: Herbie Naqvi    Procedure(s) Performed: Procedure(s) (LRB):  ROBOTIC PROSTATECTOMY (N/A)    Patient location: PACU    Anesthesia Type: general    Transport from OR: Transported from OR on 6-10 L/min O2 by face mask with adequate spontaneous ventilation    Post pain: adequate analgesia    Post assessment: no apparent anesthetic complications    Post vital signs: stable    Level of consciousness: awake and alert    Nausea/Vomiting: no nausea/vomiting    Complications: none    Transfer of care protocol was followed      Last vitals: Visit Vitals  /82 (BP Location: Left arm, Patient Position: Lying)   Pulse 61   Temp 36.5 °C (97.7 °F) (Oral)   Resp 18   SpO2 97%

## 2023-12-20 NOTE — PROGRESS NOTES
post op    Resting comfortably  Af vss  Ab benign  Bladder not distended  Urine bloody but no clots    Will monitor overnight  Cont IVF due to hematuria  Irrigate alejo prn

## 2023-12-20 NOTE — BRIEF OP NOTE
[facility] ([place])  Brief Operative Note    SUMMARY     Surgery Date: 12/20/2023     Surgeon(s) and Role:     * Armani Miller MD - Primary    Assisting Surgeon: None    Pre-op Diagnosis:  Prostate cancer [C61]    Post-op Diagnosis:  Post-Op Diagnosis Codes:     * Prostate cancer [C61]    Procedure(s) (LRB):  ROBOTIC PROSTATECTOMY (N/A)    Anesthesia: General    Implants:  * No implants in log *    Operative Findings:   No evidence of extraprostatic disease  RALP with bilateral pelvic lymph node dissection performed without issue    Estimated Blood Loss: * No values recorded between 12/20/2023  7:35 AM and 12/20/2023 11:14 AM *    Estimated Blood Loss has been documented.         Specimens:   Specimen (24h ago, onward)      None            [id number]

## 2023-12-20 NOTE — OR NURSING
VSS on 2LNC, patient AAOx4. 5 lap sites CDI, midline lap site w/ guaze & teg, scant drainage present. Douglas to gravity, clear red. Patient states pain is tolerable at this time. No complaints of nausea or any other discomfort at this time. Spouse updated via phone call. Recovery complete, report called to [address] nurse, transport requested.

## 2023-12-20 NOTE — SUBJECTIVE & OBJECTIVE
Past Medical History:   Diagnosis Date    Prostate cancer        Past Surgical History:   Procedure Laterality Date    COLONOSCOPY N/A 11/30/2017    Procedure: COLONOSCOPY;  Surgeon: John Roman MD;  Location: [facility];  Service: Endoscopy;  Laterality: N/A;  colonoscopy confirmed    wrist broke       rt side wrist       Review of patient's allergies indicates:  No Known Allergies    No current facility-administered medications on file prior to encounter.     Current Outpatient Medications on File Prior to Encounter   Medication Sig    latanoprost 0.005 % ophthalmic solution Place 1 drop into both eyes once daily.    meloxicam (MOBIC) 15 MG tablet Take 1 tablet (15 mg total) by mouth once daily.    tamsulosin (FLOMAX) 0.4 mg Cap Take 1 capsule by mouth once daily     Family History       Problem Relation (Age of Onset)    Diabetes Mother    Heart disease Father    Hyperlipidemia Father          Tobacco Use    Smoking status: Former     Types: Cigarettes     Start date: 3/25/2014    Smokeless tobacco: Never   Substance and Sexual Activity    Alcohol use: No     Comment: former drinker social stop drinking    Drug use: No    Sexual activity: Yes     Partners: Female     Birth control/protection: None     Comment:      Review of Systems   Constitutional:  Negative for fever.   Respiratory:  Negative for shortness of breath.    Cardiovascular:  Negative for chest pain and leg swelling.   Gastrointestinal:  Negative for abdominal pain.   Genitourinary:  Positive for frequency.        Nocturia    Psychiatric/Behavioral:  Negative for agitation and confusion.      Objective:     Vital Signs (Most Recent):  Temp: 97.6 °F (36.4 °C) (12/20/23 1528)  Pulse: 66 (12/20/23 1528)  Resp: 18 (12/20/23 1528)  BP: 122/77 (12/20/23 1528)  SpO2: (!) 92 % (12/20/23 1528) Vital Signs (24h Range):  Temp:  [97.4 °F (36.3 °C)-97.7 °F (36.5 °C)] 97.6 °F (36.4 °C)  Pulse:  [56-71] 66  Resp:  [12-18] 18  SpO2:  [92 %-100 %] 92  %  BP: (108-132)/(69-82) 122/77     Weight: 86.5 kg (190 lb 11.2 oz)  Body mass index is 26.6 kg/m².     Physical Exam  Constitutional:       General: He is not in acute distress.  Pulmonary:      Effort: No respiratory distress.      Breath sounds: No wheezing.   Abdominal:      General: There is no distension.   Musculoskeletal:      Right lower leg: No edema.      Left lower leg: No edema.   Neurological:      General: No focal deficit present.      Mental Status: He is oriented to person, place, and time.          Significant Labs: All pertinent labs within the past 24 hours have been reviewed.    Significant Imaging: I have reviewed all pertinent imaging results/findings within the past 24 hours.

## 2023-12-20 NOTE — OP NOTE
[facility] - Operative Note     PREOPERATIVE DIAGNOSIS: Prostate Cancer, unfavorable intermediate  Patient Active Problem List    Diagnosis Date Noted    Nocturia 11/06/2023    Prediabetes 10/27/2023    Prostate cancer 10/27/2023    Anemia 10/27/2023    Vitamin D insufficiency 12/12/2021    Personal history of other endocrine, nutritional and metabolic disease 12/12/2021    Primary osteoarthritis of right knee 12/10/2021    Complex tear of medial meniscus of right knee as current injury 03/22/2018       POSTOPERATIVE DIAGNOSIS:same     DATE: 12/20/2023    PROCEDURE:   1. Robotic-assisted laparoscopic prostatectomy and bilateral pelvic lymph node dissection    SURGEON: Armani Miller MD     Assistant: Adam Dash MD    ANESTHESIA: General endotracheal.     PATHOLOGICAL DIAGNOSIS: Pending.     IVF:see anesthesia     EBL: 100 cc    UOP:see anesthesia records     Drains:   1. 20 Taiwanese alejo     Findings:   No evidence of extraprostatic disease  No anastomotic leak on leak testing     Complications: none     Indications: 69 yo M with McGuffey 7 , Stage [id number] prostate cancer.  All treatment options have been discussed with him including but not limited to active surveillance, surgery, radiation therapy, cryosurgery, hormonal therapy, chemotherapy, and combinations of the above.  We discussed the unfavorable intermediate nature of his disease.  We discussed referral to radiation oncology and medical oncology.  We discussed open and robotic surgical approaches and brachytherapy and other methods of delivering readiation therapy.  I answered all of their questions.     PROCEDURE NOTE: After general endotracheal anesthesia, the patient was carefully positioned, padded, prepped and draped. Positioning and padding was checked by the surgeon and the circulating nurse. IV antibiotics were administered within 1 hour prior to making initial skin incision. An OG tube was placed. A Alejo catheter was placed. A timeout was  called. The patient's identity was confirmed with 2 identifiers. The correct procedure, allergies, blood products were verified by the entire operative team.     A Veress needle was placed at the supraumbilical position and the abdomen insufflated to 15 mmHg. An 8 mm trocar was placed at this site and a camera was introduced. The abdominal cavity was carefully inspected. There was no evidence of trauma to the peritoneal contents, nor any evidence of injury to the retroperitoneum. Four more trocars were placed under direct vision. We turned our attention first towards the patient's right side. We identified an area 8cm off the midline and 18.5cm from the pubis and placed an 8mm trocar under direct vision. A 12mm trocar was then placed at least 8cm lateral to this and 2 finger breadths off the ASIS. We then our attention towards the patient's left. An 8mm trocar was placed 8cm off the midline and 18.5cm from the pubis. At least 8cm lateral to this and between 18 to 22cm off the pubis we placed another 8mm trocar.     The 5mm trocar was then placed under direct vision inferior to the costal margin between the supraumbilical port and 8mm working trocar on the right. The robot was docked.   The peritoneum posterior to the bladder was incised, the right vas deferens was identified and divided. The right seminal vesicle was gently dissected away from surrounding tissue with care being taken not to cause stretch or thermal injury to the neurovascular bundle. Similar procedure was performed on the left side. Both seminal vesicles were retracted anteriorly. Denonvilliers fascia was incised and this plane of dissection carried down to the level of the urethra. A posterior release was performed bilaterally.     An anterior bladder drop was performed. Endopelvic fascia on the right and left were gently dissected posteriorly, thus beginning the lateral release. A 2-0 V-lock was then used to ligate and divide the dorsal venous  complex with care taken to preserve the external urinary sphincter and the neurovascular bundles. Excellent hemostasis was noted at this juncture. The bladder neck was then divided. A bladder neck sparing approach was utilized. Excellent preservation of the internal sphincter was achieved circumferentially.     After dividing the posterior bladder neck, the seminal vesicles were revisualized and retracted anteriorly. The both lateral pedicles were controlled with weck clips.   Cold scissors were used to athermically dissect the neurovascular bundle away from the capsule of the prostate down to the level of the urethra, thus preserving the both neurovascular bundles.     The urethra was divided, the pelvis irrigated and carefully inspected. There was no evidence of rectal trauma and excellent hemostasis. We then performend a bilateral pelvic lymph node dissection was completed. The borders of dissection were the iliac vessels laterally, the ureters cephalad, the inguinal ligament distally and the pelvic floor medially. Care was taken to identify and preserve the obturator nerve bilaterally.     A vesicourethral anastomosis was then performed with a running suture of 2-0 V-lock. Care was taken to avoid the ureteral orifices. After completing the anastomosis, a fresh Douglas catheter was advanced into the bladder and the balloon was inflated. The bladder was irrigated, there was noted to be no extravasation. The specimen was inspected and placed in an EndoCatch bag. Specimen was removed from the field and port sites were closed. Needle and sponge counts were correct. The patient was transferred to the Recovery Room in stable condition.      Disposition: The patient will return to the floor for continued observation.

## 2023-12-20 NOTE — HPI
Pt with PMH of prostate cancer s/p Robotic-assisted laparoscopic prostatectomy and bilateral pelvic lymph node dissection 12/20. Hospital medicine consulted for co management.  Patient with no issues currently.

## 2023-12-21 VITALS
DIASTOLIC BLOOD PRESSURE: 60 MMHG | BODY MASS INDEX: 26.7 KG/M2 | OXYGEN SATURATION: 96 % | HEIGHT: 71 IN | WEIGHT: 190.69 LBS | SYSTOLIC BLOOD PRESSURE: 125 MMHG | HEART RATE: 68 BPM | TEMPERATURE: 98 F | RESPIRATION RATE: 16 BRPM

## 2023-12-21 LAB
ANION GAP SERPL CALC-SCNC: 9 MMOL/L (ref 8–16)
BASOPHILS # BLD AUTO: 0.03 K/UL (ref 0–0.2)
BASOPHILS NFR BLD: 0.3 % (ref 0–1.9)
BUN SERPL-MCNC: 12 MG/DL (ref 8–23)
CALCIUM SERPL-MCNC: 8.9 MG/DL (ref 8.7–10.5)
CHLORIDE SERPL-SCNC: 107 MMOL/L (ref 95–110)
CO2 SERPL-SCNC: 24 MMOL/L (ref 23–29)
CREAT SERPL-MCNC: 0.8 MG/DL (ref 0.5–1.4)
DIFFERENTIAL METHOD BLD: ABNORMAL
EOSINOPHIL # BLD AUTO: 0 K/UL (ref 0–0.5)
EOSINOPHIL NFR BLD: 0 % (ref 0–8)
ERYTHROCYTE [DISTWIDTH] IN BLOOD BY AUTOMATED COUNT: 14.3 % (ref 11.5–14.5)
EST. GFR  (NO RACE VARIABLE): >60 ML/MIN/1.73 M^2
GLUCOSE SERPL-MCNC: 117 MG/DL (ref 70–110)
HCT VFR BLD AUTO: 35.7 % (ref 40–54)
HGB BLD-MCNC: 11.8 G/DL (ref 14–18)
IMM GRANULOCYTES # BLD AUTO: 0.02 K/UL (ref 0–0.04)
IMM GRANULOCYTES NFR BLD AUTO: 0.2 % (ref 0–0.5)
LYMPHOCYTES # BLD AUTO: 1.7 K/UL (ref 1–4.8)
LYMPHOCYTES NFR BLD: 19.2 % (ref 18–48)
MCH RBC QN AUTO: 29.6 PG (ref 27–31)
MCHC RBC AUTO-ENTMCNC: 33.1 G/DL (ref 32–36)
MCV RBC AUTO: 90 FL (ref 82–98)
MONOCYTES # BLD AUTO: 0.8 K/UL (ref 0.3–1)
MONOCYTES NFR BLD: 8.3 % (ref 4–15)
NEUTROPHILS # BLD AUTO: 6.5 K/UL (ref 1.8–7.7)
NEUTROPHILS NFR BLD: 72 % (ref 38–73)
NRBC BLD-RTO: 0 /100 WBC
PLATELET # BLD AUTO: 191 K/UL (ref 150–450)
PMV BLD AUTO: 9.2 FL (ref 9.2–12.9)
POTASSIUM SERPL-SCNC: 4.2 MMOL/L (ref 3.5–5.1)
RBC # BLD AUTO: 3.99 M/UL (ref 4.6–6.2)
SODIUM SERPL-SCNC: 140 MMOL/L (ref 136–145)
WBC # BLD AUTO: 9.08 K/UL (ref 3.9–12.7)

## 2023-12-21 PROCEDURE — 25000003 PHARM REV CODE 250: Mod: HCNC | Performed by: UROLOGY

## 2023-12-21 PROCEDURE — 80048 BASIC METABOLIC PNL TOTAL CA: CPT | Mod: HCNC | Performed by: STUDENT IN AN ORGANIZED HEALTH CARE EDUCATION/TRAINING PROGRAM

## 2023-12-21 PROCEDURE — 36415 COLL VENOUS BLD VENIPUNCTURE: CPT | Mod: HCNC | Performed by: STUDENT IN AN ORGANIZED HEALTH CARE EDUCATION/TRAINING PROGRAM

## 2023-12-21 PROCEDURE — 25000003 PHARM REV CODE 250: Mod: HCNC | Performed by: STUDENT IN AN ORGANIZED HEALTH CARE EDUCATION/TRAINING PROGRAM

## 2023-12-21 PROCEDURE — 85025 COMPLETE CBC W/AUTO DIFF WBC: CPT | Mod: HCNC | Performed by: STUDENT IN AN ORGANIZED HEALTH CARE EDUCATION/TRAINING PROGRAM

## 2023-12-21 RX ORDER — DOCUSATE SODIUM 100 MG/1
100 CAPSULE, LIQUID FILLED ORAL 2 TIMES DAILY
Qty: 20 CAPSULE | Refills: 0 | Status: SHIPPED | OUTPATIENT
Start: 2023-12-21 | End: 2023-12-31

## 2023-12-21 RX ORDER — POLYETHYLENE GLYCOL 3350 17 G/17G
17 POWDER, FOR SOLUTION ORAL DAILY
Qty: 238 G | Refills: 0 | Status: SHIPPED | OUTPATIENT
Start: 2023-12-21

## 2023-12-21 RX ORDER — OXYCODONE HYDROCHLORIDE 5 MG/1
5 TABLET ORAL EVERY 4 HOURS PRN
Qty: 10 TABLET | Refills: 0 | Status: SHIPPED | OUTPATIENT
Start: 2023-12-21

## 2023-12-21 RX ORDER — ACETAMINOPHEN 500 MG
1000 TABLET ORAL EVERY 8 HOURS
Qty: 42 TABLET | Refills: 0 | Status: SHIPPED | OUTPATIENT
Start: 2023-12-21 | End: 2023-12-28

## 2023-12-21 RX ADMIN — ACETAMINOPHEN 650 MG: 325 TABLET, FILM COATED ORAL at 08:12

## 2023-12-21 RX ADMIN — OXYCODONE HYDROCHLORIDE 5 MG: 5 TABLET ORAL at 03:12

## 2023-12-21 RX ADMIN — ACETAMINOPHEN 650 MG: 325 TABLET, FILM COATED ORAL at 01:12

## 2023-12-21 RX ADMIN — OXYCODONE HYDROCHLORIDE 5 MG: 5 TABLET ORAL at 12:12

## 2023-12-21 NOTE — SUBJECTIVE & OBJECTIVE
"Interval History: NAEON. Tolerating diet. Pain well controlled. Ambulating without issue.      Objective:     Temp:  [97.4 °F (36.3 °C)-98.5 °F (36.9 °C)] 98.5 °F (36.9 °C)  Pulse:  [56-81] 78  Resp:  [12-18] 18  SpO2:  [92 %-100 %] 94 %  BP: (108-140)/(62-84) 125/62     Body mass index is 26.6 kg/m².           Drains       Drain  Duration                  Urethral Catheter 12/20/23 1054 20 Fr. <1 day                     Physical Exam  Vitals reviewed.   Constitutional:       General: He is not in acute distress.     Appearance: He is well-developed. He is not diaphoretic.   HENT:      Head: Normocephalic and atraumatic.   Eyes:      General: No scleral icterus.  Cardiovascular:      Rate and Rhythm: Normal rate.   Pulmonary:      Effort: Pulmonary effort is normal. No respiratory distress.      Breath sounds: No stridor.   Abdominal:      General: There is no distension.      Palpations: Abdomen is soft.      Tenderness: There is no abdominal tenderness.      Comments: Incisions c/d/I  Abdomen soft   Genitourinary:     Comments: Douglas draining clear yellow urine  Musculoskeletal:         General: Normal range of motion.      Cervical back: Normal range of motion.   Skin:     General: Skin is warm and dry.   Neurological:      Mental Status: He is alert.   Psychiatric:         Behavior: Behavior normal.           Significant Labs:    BMP:  Recent Labs   Lab 12/21/23  0340      K 4.2      CO2 24   BUN 12   CREATININE 0.8   CALCIUM 8.9       CBC:   Recent Labs   Lab 12/21/23  0340   WBC 9.08   HGB 11.8*   HCT 35.7*          Blood Culture: No results for input(s): "LABBLOO" in the last 168 hours.  Urine Culture: No results for input(s): "LABURIN" in the last 168 hours.    Significant Imaging:  All pertinent imaging results/findings from the past 24 hours have been reviewed.                "

## 2023-12-21 NOTE — PLAN OF CARE
Problem: Adult Inpatient Plan of Care  Goal: Plan of Care Review  Outcome: Ongoing, Progressing  Goal: Patient-Specific Goal (Individualized)  Outcome: Ongoing, Progressing  Goal: Absence of Hospital-Acquired Illness or Injury  Outcome: Ongoing, Progressing  Goal: Optimal Comfort and Wellbeing  Outcome: Ongoing, Progressing   POC reviewed with pt. A&Ox4. Room air. Douglas in place. LR infusing at 40 mL/hr. Ambulation promoted. Safety checks performed. Bed in lowest position. Wheels locked. Call light in reach. WCTM.

## 2023-12-21 NOTE — ASSESSMENT & PLAN NOTE
- Continue regular diet  - Miralax, colace daily prn constipation  - Maintain SCDs  - Continue prn pain and nausea control  - Continue to encourage ambulation  - Continue IS use  - d/c mIVF   Dispo: home today pending breakfast, walk.

## 2023-12-21 NOTE — DISCHARGE INSTRUCTIONS
What to expect with your Robotic Assisted Laparoscopic Prostatectomy  [facility]  After surgery  You will have a catheter after your surgery.  This catheter protects your tissues to allow for healing between the bladder neck and the urethra now that the prostate has been removed.  DO NOT HAVE THIS CATHETER REMOVED OR EXCHANGED BY ANYONE OTHER THAN THE [facility] TEAM  If your catheter is not draining, call the on call physician or go to the [facility] if possible for further evaluation  If you live out of town and have to go to a local ER, DO NOT let that doctor remove or exchange your catheter; have them page the [facility] resident on call immediately at [phone] to help answer any questions  The catheter should come out in 7-10 days  You will have a midline incision after surgery where the prostate was removed, and smaller incisions where the instruments were inserted. All incisions were closed with absorbable sutures.  The night of surgery we expect and hope that you will:  Walk - walking helps start the bowels moving. Also after your surgery, you are at a risk for a deep venous thrombosis (a clot in the legs that can form by remaining inactive or still for extended periods of time) and this can travel to your lungs and make you feel short of breath. This is a very serious condition. Walking helps prevent the risk of a DVT from occurring.  Eat and Drink - you do not have to eat a whole meal, but we want to make sure you can tolerate liquid and/or solid food without nausea and vomiting  Use your incentive spirometer - this is the breathing apparatus that helps you expand your lungs. If you have pain, you may not want to take deep breaths, but if you shallow breaths put you at risk for pneumonia. The incentive spirometer will help decrease this risk by expanding your lungs.  Symptoms you may experience immediately post-op:  Bloating and/or shoulder pain - during the  operation, the abdominal cavity is filled with gas to help visualize the organs and allow the instruments to fit. After the surgery, not all the air is removed, but your body will eventually absorb this small amount of air. This air can make you feel bloated. In addition, when you sit up, the air can sit under a muscle (the diaphragm) which has connecting nerves to the shoulders, and this pain is referred of felt in the shoulder.  It may take a few days to pass gas or to have a bowel movement - this goes along with the bloating, you may feel like you want to pass gas or have a bowel movement but you cant. This is normal and should pass in a couple days. There are no pills to help with this. Small walks throughout the day should help with this.  Pain - take ibuprofen and tylenol around the clock for the first week. You will be given oxycodone tablets to take as needed. Only take these if your pain is not controlled by the over the counter medications. Opioids can cause constipation and worsen of bloating and abdominal cramping  Bladder spasms - this feels like you have to urinate but cant. Sometimes it can be due to clots clogging up your catheter. As long as the catheter is draining, the spasms should subside. If the spasms persist, there is a prescription medication that can help relieve that spasms   When you go home:  Catheter care  Blood in your urine (hematuria) is normal. However if you are having clots, your catheter stops draining, and you are experiencing worsening abdominal pain, go to the ER.  If the tip of your penis hurts with the catheter in place, you can put some Vaseline or Orajel (numbing medication) at the end of the catheter to help lubricate the catheter.  Activity  Continue to walk - small walks throughout the day are better than one long walk.   Do not lift anything greater than 8 pounds for 6 weeks - this allows the abdominal wall muscles to heal and prevents a hernia formation  Bowel  Movements - Do not strain to have a bowel movement - the pain medicines will make you constipated. Take 1 cap of miralax daily to help keep your bowels regular. If you are still having trouble, then you can increase to 2 caps of Miralax per day. Once you are having regular bowel movements you can stop taking the stool softeners  Smoking - If you smoke, it is encouraged that you STOP smoking. Smoking interferes with the healing process  Driving - Do not drive while you are on pain medications or with your catheter in place.  Bathing - You can shower 24 hours after your surgery. Do not submerge incisions in a bathtub or pool. Let soap and water run over the incisions but do not scrub the incisions  Dressing - incisions have skin glue on them which will peel off over the course of 1-2 weeks.  Kegels - do not start your Kegels until after your catheter is out.  When to return to the ER  Fever - If you have a fever >101.5, this could be due to a number of reasons. Please either call the office or be evaluated in the ER.  Severe pain - pain is expected, but severe or new onset of pain is not normal.   Inability to tolerate food or liquid with nausea and vomiting - go to the ER for them to better evaluate you.

## 2023-12-21 NOTE — DISCHARGE SUMMARY
[facility]   Patient Name: Ruth Piper   Procedure Date: 3/30/2018 2:34 PM   MRN: [id number]   Account Number: [id number]   YOB: 1939   Admit Type: Inpatient   Age: 78   Gender: Female   Note Status: Finalized   Attending MD: Eunice Hugo MD   Procedure:                    Upper GI endoscopy   Indications:                  Dysphagia   Providers:                    Eunice Hugo MD   Sedation:                     Midazolam 2 mg IV, Fentanyl 25 micrograms IV   Procedure:        Pre-Anesthesia Assessment:        - Prior to the procedure, a History and Physical was performed, and        patient medications and allergies were reviewed. The patient is unable        to give consent secondary to the patient's altered mental status. The        risks and benefits of the procedure and the sedation options and risks        were discussed with the patient's relative. All questions were answered        and informed consent was obtained. Patient identification and proposed        procedure were verified by the physician and the nurse bedside. Mental        Status Examination: alert but confused. Airway Examination: Mallampati        Class III (part of the uvula and soft palate visualized). Prophylactic        Antibiotics: The patient requires prophylactic antibiotics for planned        PEG placement. The patient received antibiotic therapy within 24 hours        prior to starting today's procedure. Prior Anticoagulants: The patient        has taken no previous anticoagulant or antiplatelet agents. ASA Grade        Assessment: III - A patient with severe systemic disease. After        reviewing the risks and benefits, the patient was deemed in satisfactory        condition to undergo the procedure. The anesthesia plan was to use        moderate sedation / analgesia (conscious sedation). Immediately prior to        administration of medications, the patient was re-assessed for adequacy        to receive  [facility] ([address])  Urology  Discharge Summary      Patient Name: Herbie Naqvi  MRN: [id number]  Admission Date: 12/20/2023  Hospital Length of Stay: 1 days  Discharge Date and Time:  12/21/2023 7:46 AM  Attending Physician: Armani Miller MD   Discharging Provider: Lazaro Giang MD  Primary Care Physician: Jake Goodrich MD    HPI:   Herbie Naqvi is a 69 yo M with a history of prostate cancer s/p RALP w BPLND on 12/20/23.     Procedure(s) (LRB):  ROBOTIC PROSTATECTOMY (N/A)     Indwelling Lines/Drains at time of discharge:   Lines/Drains/Airways       Drain  Duration                  Urethral Catheter 12/20/23 1054 20 Fr. <1 day                    Hospital Course (synopsis of major diagnoses, care, treatment, and services provided during the course of the hospital stay):    Patient was admitted to the hospital for procedures as described above, please see operative note for full details. Patient tolerated the procedure well, and was taken to PACU postoperatively for observation during their continued recovery from anesthesia. After an appropriate duration of observation, patient was deemed stable for transfer to the floor to continue their recovery. The postoperative course was largely normal. Patient was able to ambulate normally. Patient was able to tolerate prescribed diet. Nausea and pain were controlled with oral medications. Patient was deemed stable for discharge on POD 1 and was discharged with appropriate medications, instructions, and follow up.    Medications and instructions as below.  For more thorough information, please refer to the hospital record and operative report.    Consults:   Consults (From admission, onward)          Status Ordering Provider     Inpatient consult to Hospital Medicine  Once        Provider:  Rinku Fragoso MD    Acknowledged LAZARO GIANG            Significant Diagnostic Studies:     Pending Diagnostic Studies:       Procedure Component Value  Units Date/Time    Specimen to Pathology, Surgery Urology [[id number]] Collected: 12/20/23 1127    Order Status: Sent Lab Status: In process Updated: 12/20/23 1447    Specimen: Tissue             Final Active Diagnoses:    Diagnosis Date Noted POA    PRINCIPAL PROBLEM:  Prostate cancer [C61] 10/27/2023 Yes    Prediabetes [R73.03] 10/27/2023 Yes    Primary osteoarthritis of right knee [M17.11] 12/10/2021 Yes      Problems Resolved During this Admission:       Discharged Condition: good    Disposition: Home or Self Care    Follow Up:   Follow-up Information       Angela Finley NP Follow up on 1/2/2024.    Specialty: Urology  Why: for catheter removal  Contact information:  [address] [place] [address] [phone]                           Patient Instructions:      No driving until:   Order Comments: No driving while taking opioid pain medications.     Notify your health care provider if you experience any of the following:  temperature >100.4     Notify your health care provider if you experience any of the following:  persistent nausea and vomiting or diarrhea     Notify your health care provider if you experience any of the following:  severe uncontrolled pain     Notify your health care provider if you experience any of the following:  difficulty breathing or increased cough     Notify your health care provider if you experience any of the following:  severe persistent headache     Notify your health care provider if you experience any of the following:  persistent dizziness, light-headedness, or visual disturbances     Notify your health care provider if you experience any of the following:  increased confusion or weakness     Medications:  Reconciled Home Medications:      Medication List        START taking these medications      acetaminophen 500 MG tablet  Commonly known as: TYLENOL  Take 2 tablets (1,000 mg total) by mouth every 8 (eight) hours. for 7 days     docusate sodium 100 MG  sedatives. The heart rate, respiratory rate, oxygen        saturations, blood pressure, adequacy of pulmonary ventilation, and        response to care were monitored throughout the procedure. The physical        status of the patient was re-assessed after the procedure.        A History and Physical was performed prior to the procedure. Patient        medications and allergies were reviewed. The risks and benefits of the        procedure and sedation options were discussed. Questions were answered        and informed consent was obtained. Patient identification and proposed        procedure were verified. The patient was deemed in satisfactory        condition to undergo the procedure. The heart rate, respiratory rate,        oxygen saturations, blood pressure and response to sedation were        monitored throughout the procedure.        The endoscope was passed under direct vision. The Endoscope was        introduced through the mouth, and advanced to the second part of        duodenum. . The physical status of the patient was re-assessed after the        procedure. The upper GI endoscopy was accomplished without difficulty.        The patient tolerated the procedure well.   Findings:        The examined esophagus was normal.        Multiple dispersed erosions were found in the entire examined stomach.        There were stigmata of recent bleeding.        Multiple dispersed, non-bleeding erosions were found in the stomach.        There were stigmata of recent bleeding.        The duodenal bulb and second portion of the duodenum were normal.        The patient was placed in the supine position for PEG placement. The        stomach was insufflated to appose gastric and abdominal walls. A site        was located in the body of the stomach with good transillumination and        manual external pressure for placement. The abdominal wall was marked        and prepped in a sterile manner. The area was anesthetized with 2  capsule  Commonly known as: COLACE  Take 1 capsule (100 mg total) by mouth 2 (two) times daily. for 10 days     oxyCODONE 5 MG immediate release tablet  Commonly known as: ROXICODONE  Take 1 tablet (5 mg total) by mouth every 4 (four) hours as needed for Pain.     polyethylene glycol 17 gram/dose powder  Commonly known as: GLYCOLAX  Take 17 g by mouth once daily.            CONTINUE taking these medications      fish oil-omega-3 fatty acids 300-1,000 mg capsule  Take by mouth once daily.     latanoprost 0.005 % ophthalmic solution  Place 1 drop into both eyes once daily.     meloxicam 15 MG tablet  Commonly known as: MOBIC  Take 1 tablet (15 mg total) by mouth once daily.     multivitamin with minerals tablet  Take 1 tablet by mouth once daily.     tadalafiL 20 MG Tab  Commonly known as: CIALIS  Take 1 tablet (20 mg total) by mouth every 72 hours as needed (ED).     tamsulosin 0.4 mg Cap  Commonly known as: FLOMAX  Take 1 capsule by mouth once daily              Time spent on the discharge of patient: 15 minutes    Fortunato aDsh MD  Urology  [facility] ([address])     mL of        1% lidocaine. The trocar needle was introduced through the abdominal        wall and into the stomach under direct endoscopic view. A snare was        introduced through the endoscope and opened in the gastric lumen. The        guide wire was passed through the trocar and into the open snare. The        snare was closed around the guide wire. The endoscope and snare were        removed, pulling the wire out through the mouth. A skin incision was        made at the site of needle insertion. The externally removable 20 Fr        Bard gastrostomy tube was lubricated. The G-tube was passed over the        guide wire through the mouth, and into the stomach. The trocar needle        was removed, and the gastrostomy tube was pulled out from the stomach        through the skin. The guide wire was removed, and the external bumper        attached to the gastrostomy tube. The feeding tube was then cut to an        appropriate length. The final position of the gastrostomy tube was        confirmed by relook endoscopy, and skin marking noted to be 3.5 cm at        the external bumper. The final tension and compression of the abdominal        wall by the PEG tube and external bumper were checked and revealed that        the bumper was loose and not touching the skin and that the PEG balloon        was loose and lightly touching the stomach. The feeding tube was capped,        and the tube site was cleaned and dressed.   Impression:        - Normal esophagus.        - Recently bleeding erosive gastropathy.        - Non-bleeding erosive gastropathy.        - Normal duodenal bulb and second portion of the duodenum.        - An externally removable PEG placement was successfully completed.        - No specimens collected.   Recommendation:        - No aspirin, ibuprofen, naproxen, or other non-steroidal        anti-inflammatory drugs for 1 day.        - Please follow the post-PEG recommendations including: Nutrition         consult for formula and volume, change dressing once per day, dry        dressing only, remove dressing after 2 weeks, change dressing on top of        bumper daily, may place dressing under bumper after day 3, may use PEG        today for meds and water, may use PEG tomorrow for feedings and flush        PEG daily with 60 ml water. Due to severity of gastric erosions would        keep patient on PPI if renal status permits or if renal disease give H2        blockers   Complications:        No immediate complications.   MD Eunice Davis MD   3/30/2018 5:43:25 PM   This report has been signed electronically by the above.   Number of Addenda: 0   Scope In:   Scope Out:        [facility] [address] [address] [phone]

## 2023-12-21 NOTE — PLAN OF CARE
LMSW met with the patient at the bedside. Patient is alert and oriented with no communication barriers. Prior to admission, the patient is independent. Patient choice pharmacy is bedside. Patient's family will transport the patient home at discharge.     [facility] ([address])  Discharge Final Note    Primary Care Provider: Jake Goodrich MD    Expected Discharge Date: 12/21/2023    Final Discharge Note (most recent)       Final Note - 12/21/23 0904          Final Note    Assessment Type Final Discharge Note (P)      Anticipated Discharge Disposition Home or Self Care (P)      Hospital Resources/Appts/Education Provided Provided patient/caregiver with written discharge plan information;Appointments scheduled and added to AVS (P)         Post-Acute Status    Post-Acute Authorization Other (P)      Other Status No Post-Acute Service Needs (P)      Discharge Delays None known at this time (P)                    Contact Angela Lipscomb NP   Specialty: Urology    [address] [place] [address]   Phone: [phone]       Next Steps: Follow up on 1/2/2024    Instructions: for catheter removal

## 2023-12-21 NOTE — PROGRESS NOTES
[facility] ([address])  Urology  Progress Note    Patient Name: Herbie Naqvi  MRN: [id number]  Admission Date: 12/20/2023  Hospital Length of Stay: 1 days  Code Status: Full Code   Attending Provider: Armani Miller MD   Primary Care Physician: Jake Goodrich MD    Subjective:     HPI:  Herbie Naqvi is a 69 yo M with a history of prostate cancer s/p RALP w BPLND on 12/20/23.    Interval History: NAEON. Tolerating diet. Pain well controlled. Ambulating without issue.      Objective:     Temp:  [97.4 °F (36.3 °C)-98.5 °F (36.9 °C)] 98.5 °F (36.9 °C)  Pulse:  [56-81] 78  Resp:  [12-18] 18  SpO2:  [92 %-100 %] 94 %  BP: (108-140)/(62-84) 125/62     Body mass index is 26.6 kg/m².           Drains       Drain  Duration                  Urethral Catheter 12/20/23 1054 20 Fr. <1 day                     Physical Exam  Vitals reviewed.   Constitutional:       General: He is not in acute distress.     Appearance: He is well-developed. He is not diaphoretic.   HENT:      Head: Normocephalic and atraumatic.   Eyes:      General: No scleral icterus.  Cardiovascular:      Rate and Rhythm: Normal rate.   Pulmonary:      Effort: Pulmonary effort is normal. No respiratory distress.      Breath sounds: No stridor.   Abdominal:      General: There is no distension.      Palpations: Abdomen is soft.      Tenderness: There is no abdominal tenderness.      Comments: Incisions c/d/I  Abdomen soft   Genitourinary:     Comments: Douglas draining clear yellow urine  Musculoskeletal:         General: Normal range of motion.      Cervical back: Normal range of motion.   Skin:     General: Skin is warm and dry.   Neurological:      Mental Status: He is alert.   Psychiatric:         Behavior: Behavior normal.           Significant Labs:    BMP:  Recent Labs   Lab 12/21/23  0340      K 4.2      CO2 24   BUN 12   CREATININE 0.8   CALCIUM 8.9       CBC:   Recent Labs   Lab 12/21/23  0340   WBC 9.08   HGB 11.8*   HCT 35.7*  "         Blood Culture: No results for input(s): "LABBLOO" in the last 168 hours.  Urine Culture: No results for input(s): "LABURIN" in the last 168 hours.    Significant Imaging:  All pertinent imaging results/findings from the past 24 hours have been reviewed.                  Assessment/Plan:     * Prostate cancer  - Continue regular diet  - Miralax, colace daily prn constipation  - Maintain SCDs  - Continue prn pain and nausea control  - Continue to encourage ambulation  - Continue IS use  - d/c mIVF   Dispo: home today pending breakfast, walk.          VTE Risk Mitigation (From admission, onward)      None            Fortunato Dash MD  Urology  [facility] ([address])  "

## 2023-12-21 NOTE — PLAN OF CARE
Patient discharged per primary team.  No complaints when seen in cierra Hoffmann MD  [facility] Medicine

## 2023-12-21 NOTE — PROGRESS NOTES
AVS reviewed with pt. Pt verbalizes understanding. PIV discontinued. Douglas care complete. Douglas education performed. Pt demonstrates understanding. Pt to discharge to home with family. Medications to be picked up at discharge.

## 2023-12-28 NOTE — H&P
ubjective:      Herbie Naqvi is a 68 y.o. male who returns today regarding his      No previous ab surgery  PCA on active surveillance with Dr Angel     No LUTS  ++freq     ED poor response to Cialis     The following portions of the patient's history were reviewed and updated as appropriate: allergies, current medications, past family history, past medical history, past social history, past surgical history and problem list.     Review of Systems  Pertinent items are noted in HPI.  A comprehensive multipoint review of systems was negative except as otherwise stated in the HPI.     No past medical history on file.        Past Surgical History:   Procedure Laterality Date    COLONOSCOPY N/A 11/30/2017     Procedure: COLONOSCOPY;  Surgeon: John Roman MD;  Location: [facility];  Service: Endoscopy;  Laterality: N/A;  colonoscopy confirmed    wrist broke          rt side wrist      Diabetes Medications                    gentamicin (GARAMYCIN) 40 mg/mL injection               Review of patient's allergies indicates:  No Known Allergies        Objective:   Vitals: There were no vitals taken for this visit.     Physical Exam   General: alert and oriented, no acute distress  Respiratory: Symmetric expansion, non-labored breathing  Cardiovascular: no peripheral edema  Abdomen: soft, non distended  Skin: normal coloration and turgor, no rashes, no suspicious skin lesions noted  Neuro: no gross deficits  Psych: normal judgment and insight, normal mood/affect, and non-anxious     Physical Exam     Lab Review   Urinalysis demonstrates no specimen           Lab Results   Component Value Date     WBC 3.83 (L) 10/27/2023     HGB 13.7 (L) 10/27/2023     HCT 41.2 10/27/2023     MCV 91 10/27/2023      10/27/2023            Lab Results   Component Value Date     CREATININE 0.8 10/27/2023     BUN 12 10/27/2023            Lab Results   Component Value Date     PSA 5.9 (H) 12/13/2021     PSA 2.4 10/05/2020     PSA 1.7  03/19/2019     PSADIAG 14.9 (H) 03/21/2023; psa density 0.33     PSADIAG 9.0 (H) 09/22/2022     PSATOTAL 6.9 (H) 12/30/2021                                Component 5 mo ago   Final Pathologic Diagnosis RELIAPATH DIAGNOSIS:    A.  PROSTATE, RIGHT BASE, NEEDLE BIOPSY:  Benign prostatic tissue with chronic inflammation. (R97.20)    B.  PROSTATE, RIGHT MID, NEEDLE BIOPSY:  Benign prostatic tissue with chronic inflammation, see comment. (R97.20)    C.  PROSTATE, RIGHT APEX, NEEDLE BIOPSY:  Benign prostatic tissue. (R97.20)    D.  PROSTATE, LEFT BASE, NEEDLE BIOPSY:  HISTOLOGIC TYPE: Acinar adenocarcinoma, see comment.  HISTOLOGIC GRADE:  Grade Group 1 (Rufus Score 3+3=6)  INTRADUCTAL CARCINOMA: Not identified.  CRIBRIFORM GLANDS: Not identified.  TREATMENT EFFECT: Not identified.  TUMOR QUANTITATION:  Number of Cores Positive: 2.  Total Number of Cores: 2.  Measurement of Tumor: 2 mm.  Measurement of Core Tissue: 26 mm.  Percentage of Prostatic Tissue Involved by Tumor: 8%  LYMPHOVASCULAR INVASION: Not identified.  PERINEURAL INVASION: Not identified. (C61.)    E.  PROSTATE, LEFT MID, NEEDLE BIOPSY:  HISTOLOGIC TYPE: Acinar adenocarcinoma, see comment.  HISTOLOGIC GRADE:  Grade Group 2 (Rufus Score 3+4=7)  Percentage of Pattern 4: 10%.  INTRADUCTAL CARCINOMA: Not identified.  CRIBRIFORM GLANDS: Not identified.  TREATMENT EFFECT: Not identified.  TUMOR QUANTITATION:  Number of Cores Positive: 2.  Total Number of Cores: 2.  Measurement of Tumor: 6 mm.  Measurement of Core Tissue: 21 mm.  Percentage of Prostatic Tissue Involved by Tumor: 29%.  LYMPHOVASCULAR INVASION: Not identified.  PERINEURAL INVASION: Not identified. (C61.)    F.  PROSTATE, LEFT APEX, NEEDLE BIOPSY:  PROSTATE GLAND: NEEDLE BIOPSY    HISTOLOGIC TYPE: Acinar adenocarcinoma.  HISTOLOGIC GRADE:  Grade Group 2 (Rufus Score 3+4=7)  Percentage of Pattern 4: 20%.  INTRADUCTAL CARCINOMA: Not identified.  CRIBRIFORM GLANDS: Present.  TREATMENT EFFECT: No  known presurgical therapy.  TUMOR QUANTITATION:  Number of Cores Positive: 2  Total Number of Cores: 2  Measurement of Tumor: 10 mm.  Measurement of Core Tissue: 26 mm.  Percentage of Prostatic Tissue Involved by Tumor: 38%  LYMPHOVASCULAR INVASION: Not identified.  PERINEURAL INVASION: Not identified. (C61.)    COMMENT:  *Immunohistochemical stain for prostate triple stain was performed on blocks B1, D1, and E1, and results of staining support the above diagnoses.      LISA DISLA M.D.    Report attached.    Performing site:  [address] [address]    &quot;Disclaimer:  This case diagnosis was rendered completely by the outside consultation pathologist and the case is electronically signed by an [facility] pathologist listed below solely to release the report into the medical record.&quot;   Comment: Interp By Kay Park M.D., Signed on 06/16/2023 at 17:15            Imaging  MRI PROSTATE W W/O CONTRAST     CLINICAL HISTORY:  Prostate cancer, staging;  Elevated prostate specific antigen (PSA)     Additional history: TRUS biopsy on 2/14/22  ([place] 3+3=6 in 1 core)     TECHNIQUE:  Multiparametric MRI of the prostate/pelvis performed on a 3T scanner with phase pelvic coil. Multiplanar, multisequence images including high resolution, small field-of-view T2-WI; axial diffusion weighted images with multiple B-values and creation of ADC-maps; and dynamic contrast enhanced T1-weighted images through the prostate were obtained before, during, and after the administration of 10 cc intravenous gadolinium.     COMPARISON:  None     FINDINGS:  Previous biopsy: TRUS 02/14/2022     PSA: 14.9 ng/mL 03/21/2023     Prior therapy: None     Prostate: 5.1 x 3.8 x 4.7 cm corresponding to a computed volume of 44.7 cc.     Peripheral zone: Diffuse low to mild T2 hypointense signal which may be related to distortion from adjacent bowel gas.  No convincing focal lesions.     Transitional zone: Benign  prostatic hyperplasia without focal suspicious abnormality, score 2.     Neurovascular bundle: Normal appearance.     Seminal vesicles: Normal appearance.     Adjacent Organ Involvement: No evidence for urinary bladder or rectal invasion.     Lymphadenopathy: None.     Other Findings: None.     Impression:     No concerning focal lesion.     Benign prostatic hyperplasia.     Overall Assessment: PI-RADS 2 - Low (clinically significant cancer is unlikely to be present), limited evaluation as above.     Number of targets created for potential MR/US fusion biopsy     Peripheral zone: 0     Transition zone: 0     This report was flagged in Epic as abnormal.     Electronically signed by resident: Alex Salcedo  Date:                                            04/28/2023  Time:                                           15:58     Electronically signed by: Mitchel Rutledge MD  Date:                                            04/29/2023  Time:                                           08:17  Assessment and Plan:   Prostate cancer  [place] 7 3+4 GG2 cT1c cN0 cMx; psa 14.9; unfavorable intermediate risk due to PSA progression        We discussed his grade and stage of disease, life expectancy and active surveillance vs treatment.  We discussed the roles of surgery, radiation (external and brachytherapy), HIFU and Cryosurgery, hormonal therapy and chemotherapy in the treatment of prostate cancer.  We discussed brachytherapy and external radiation therapy and open and robotic surgery.  We discussed the risks and benefits of each. We discussed erectile dysfunction, urinary incontinence and bowel issues.  We discussed referral to radiation oncology to further discuss options.  I answered his questions.        PSMA PET ASAP  Seeing Dr Farrar, rad onc, today     Tentatively schedule robotic prostatectomy and PLND 12/20     Preop appt with me ASAP after PET scan and before 12/20     Enlarged prostate 45g  Minimal symptoms      Erectile dysfunction, unspecified erectile dysfunction type  Cialis prn        UA to rule out UTI as source of elevated psa; neg

## 2024-01-02 ENCOUNTER — OFFICE VISIT (OUTPATIENT)
Dept: UROLOGY | Facility: CLINIC | Age: 69
End: 2024-01-02
Payer: MEDICARE

## 2024-01-02 VITALS
BODY MASS INDEX: 25.12 KG/M2 | HEART RATE: 88 BPM | DIASTOLIC BLOOD PRESSURE: 81 MMHG | SYSTOLIC BLOOD PRESSURE: 119 MMHG | OXYGEN SATURATION: 96 % | HEIGHT: 71 IN | WEIGHT: 179.44 LBS

## 2024-01-02 DIAGNOSIS — C61 PROSTATE CANCER: Primary | ICD-10-CM

## 2024-01-02 LAB
FINAL PATHOLOGIC DIAGNOSIS: NORMAL
Lab: NORMAL

## 2024-01-02 PROCEDURE — 3079F DIAST BP 80-89 MM HG: CPT | Mod: HCNC,CPTII,S$GLB, | Performed by: NURSE PRACTITIONER

## 2024-01-02 PROCEDURE — 3074F SYST BP LT 130 MM HG: CPT | Mod: HCNC,CPTII,S$GLB, | Performed by: NURSE PRACTITIONER

## 2024-01-02 PROCEDURE — 1159F MED LIST DOCD IN RCRD: CPT | Mod: HCNC,CPTII,S$GLB, | Performed by: NURSE PRACTITIONER

## 2024-01-02 PROCEDURE — 99024 POSTOP FOLLOW-UP VISIT: CPT | Mod: HCNC,S$GLB,, | Performed by: NURSE PRACTITIONER

## 2024-01-02 PROCEDURE — 99499 UNLISTED E&M SERVICE: CPT | Mod: HCNC,S$GLB,, | Performed by: NURSE PRACTITIONER

## 2024-01-02 PROCEDURE — 1160F RVW MEDS BY RX/DR IN RCRD: CPT | Mod: HCNC,CPTII,S$GLB, | Performed by: NURSE PRACTITIONER

## 2024-01-02 PROCEDURE — 1125F AMNT PAIN NOTED PAIN PRSNT: CPT | Mod: HCNC,CPTII,S$GLB, | Performed by: NURSE PRACTITIONER

## 2024-01-02 NOTE — PROGRESS NOTES
"Subjective:      Herbie Naqvi is a 68 y.o. male who returns today regarding his alejo catheter.     The patient is s/p RALP w BPLND on 12/20/23 with Dr. Miller. Discharged on POD #1.    He presents today for VT. Doing well. Minimal pain. Denies constipation. Denies hematuria. Denies flank pain and fever/chills.     The following portions of the patient's history were reviewed and updated as appropriate: allergies, current medications, past family history, past medical history, past social history, past surgical history and problem list.    Review of Systems  Constitutional: no fever or chills  ENT: no nasal congestion or sore throat  Respiratory: no cough or shortness of breath  Cardiovascular: no chest pain or palpitations  Gastrointestinal: no nausea or vomiting, tolerating diet  Genitourinary: as per HPI  Hematologic/Lymphatic: no easy bruising or lymphadenopathy  Musculoskeletal: no arthralgias or myalgias  Neurological: no seizures or tremors  Behavioral/Psych: no auditory or visual hallucinations     Objective:   Vitals: /81 (BP Location: Right arm, Patient Position: Sitting, BP Method: Large (Automatic))   Pulse 88   Ht 5' 11" (1.803 m)   Wt 81.4 kg (179 lb 7.3 oz)   SpO2 96%   BMI 25.03 kg/m²     Physical Exam   General: alert and oriented, no acute distress  Head: normocephalic, atraumatic  Neck: supple, normal ROM  Respiratory: Symmetric expansion, non-labored breathing  Cardiovascular: regular rate and rhythm  Abdomen: soft, non tender, non distended; abdominal incisions healing well.   Genitourinary: alejo to gravity with clear, ariel urine  Skin: normal coloration and turgor, no rashes, no suspicious skin lesions noted  Neuro: alert and oriented x3, no gross deficits  Psych: normal judgment and insight, normal mood/affect, and non-anxious    Lab Review   Urinalysis demonstrates : no specimen, alejo in place  Lab Results   Component Value Date    WBC 9.08 12/21/2023    HGB 11.8 (L) 12/21/2023 "    HCT 35.7 (L) 12/21/2023    MCV 90 12/21/2023     12/21/2023     Lab Results   Component Value Date    CREATININE 0.8 12/21/2023    BUN 12 12/21/2023     Lab Results   Component Value Date    PSA 5.9 (H) 12/13/2021     Imaging   None    Voiding Trial (Fill/Pull/Void): 120cc of sterile saline was instilled into the bladder through the previously placed alejo catheter. Unable to fill further due to spasms. The alejo balloon was then deflated and the alejo removed.  The patient subsequently voided 120cc, consistent with successful voiding trial.  The alejo was not replaced.    Assessment:     1. Prostate cancer      Plan:   Herbie was seen today for georgiana removal.    Diagnoses and all orders for this visit:    Prostate cancer  -     Prostate Specific Antigen, Diagnostic; Future    Plan:  --Successful VT  --Pathology pending- Dr. Miller will notify when available  --Kegels  --Cialis  --Continue to avoid straining for BM and lifting >5 lbs x 6 weeks   --Follow up with Dr. Miller in 1 month with PSA

## 2024-01-04 NOTE — PROGRESS NOTES
Dr Miller called Mr Naqvi with path results  He will see Dr Miller in 1 month with psa    pT3a; disease in periprostatic fat  Neg nodes  Margins neg

## 2024-05-03 DIAGNOSIS — H40.1132 PRIMARY OPEN ANGLE GLAUCOMA (POAG) OF BOTH EYES, MODERATE STAGE: ICD-10-CM

## 2024-05-06 RX ORDER — LATANOPROST 50 UG/ML
1 SOLUTION/ DROPS OPHTHALMIC
Qty: 12 ML | Refills: 0 | Status: SHIPPED | OUTPATIENT
Start: 2024-05-06

## 2024-06-10 ENCOUNTER — LAB VISIT (OUTPATIENT)
Dept: LAB | Facility: OTHER | Age: 69
End: 2024-06-10
Attending: NURSE PRACTITIONER
Payer: MEDICARE

## 2024-06-10 DIAGNOSIS — C61 PROSTATE CANCER: ICD-10-CM

## 2024-06-10 LAB — COMPLEXED PSA SERPL-MCNC: <0.01 NG/ML (ref 0–4)

## 2024-06-10 PROCEDURE — 84153 ASSAY OF PSA TOTAL: CPT | Mod: HCNC | Performed by: NURSE PRACTITIONER

## 2024-06-10 PROCEDURE — 36415 COLL VENOUS BLD VENIPUNCTURE: CPT | Mod: HCNC | Performed by: NURSE PRACTITIONER

## 2024-06-14 NOTE — PROGRESS NOTES
Subjective:      Herbie Naqvi is a 68 y.o. male who returns today regarding his     Dry  No erections with cialis    .    The following portions of the patient's history were reviewed and updated as appropriate: allergies, current medications, past family history, past medical history, past social history, past surgical history and problem list.    Review of Systems  Pertinent items are noted in HPI.  A comprehensive multipoint review of systems was negative except as otherwise stated in the HPI.    Past Medical History:   Diagnosis Date    Prostate cancer      Past Surgical History:   Procedure Laterality Date    COLONOSCOPY N/A 11/30/2017    Procedure: COLONOSCOPY;  Surgeon: John Roman MD;  Location: [facility] ENDO;  Service: Endoscopy;  Laterality: N/A;  colonoscopy confirmed    ROBOT-ASSISTED LAPAROSCOPIC PROSTATECTOMY N/A 12/20/2023    Procedure: ROBOTIC PROSTATECTOMY;  Surgeon: Armani Miller MD;  Location: [facility];  Service: Urology;  Laterality: N/A;    wrist broke       rt side wrist       Review of patient's allergies indicates:  No Known Allergies       Objective:   Vitals: There were no vitals taken for this visit.    Physical Exam   General: alert and oriented, no acute distress  Respiratory: Symmetric expansion, non-labored breathing  Cardiovascular: no peripheral edema  Abdomen: soft, non distended  Skin: normal coloration and turgor, no rashes, no suspicious skin lesions noted  Neuro: no gross deficits  Psych: normal judgment and insight, normal mood/affect, and non-anxious    Physical Exam    Lab Review   Urinalysis demonstrates negative for all components  Lab Results   Component Value Date    WBC 9.08 12/21/2023    HGB 11.8 (L) 12/21/2023    HCT 35.7 (L) 12/21/2023    MCV 90 12/21/2023     12/21/2023     Lab Results   Component Value Date    CREATININE 0.8 12/21/2023    BUN 12 12/21/2023     Lab Results   Component Value Date    PSA 5.9 (H) 12/13/2021    PSA 2.4 10/05/2020    PSA 1.7  03/19/2019    PSADIAG <0.01 06/10/2024    PSADIAG 14.9 (H) 03/21/2023    PSADIAG 9.0 (H) 09/22/2022    PSATOTAL 6.9 (H) 12/30/2021    PSAFREE 0.74 12/30/2021    PSAFREEPCT 10.72 12/30/2021     Final Pathologic Diagnosis RELIAPATH DIAGNOSIS:    A.  SPECIMEN DESIGNATED &quot;PERIPROSTATIC TISSUE&quot;:  - Adipose tissue with two small lymph nodes, negative for tumor.  - Microscopic foci of acinar adenocarcinoma present within adipose tissue adjacent to small vessel.    B.  LYMPH NODES, LEFT PELVIC, RESECTION:  - One(1) lymph node, negative for tumor, see comment.    C.  LYMPH NODES, RIGHT PELVIC, RESECTION:  - One(1) lymph node, negative for tumor, see comment.    D.  PROSTATE, ROBOTIC PROSTATECTOMY.  - Acinar adenocarcinoma, grade group 2, Rufus score 3+4=7.  - Margins of resection, negative for carcinoma.  - Focal extraprostatic tumor extension, present.  - Microscopic invasion of bladder neck, present.  - Seminal vesicles, negative for carcinoma.    PROCEDURE: Robotic prostatectomy.  PROSTATE SIZE  Weight (grams): 59.0 grams.  Size (centimeters): 4.7 x 4.1 x 3.8 cm.  HISTOLOGIC TYPE: Acinar adenocarcinoma.  HISTOLOGIC GRADE:  Grade Group 2 (Rufus Score 3+4=7)  PERCENTAGE OF PATTERN 4 IN RUFUS SCORE 7 CANCER: 40%.  MINOR TERTIARY PATTERN 5 (less than 5%): Not identified.  INTRADUCTAL CARCINOMA: Not identified.  IDC INCORPORATED INTO GRADE: N/A.  CRIBRIFORM GLANDS: Present.  TREATMENT EFFECT: No known prior treatment.  TUMOR QUANTITATION:  Estimated Percentage of Prostate Involved by Tumor: 10-20%  EXTRAPROSTATIC EXTENSION: Present.  Location of Extraprostatic Extension: Left, near apex.  URINARY BLADDER NECK INVASION: Present.  SEMINAL VESICLE INVASION: Not identified.  LYMPHOVASCULAR INVASION: Not identified.  PERINEURAL INVASION: Present.  MARGIN STATUS: Negative for invasive carcinoma.  Margin(s) Involved by Invasive Carcinoma: None.  Margin Involvement by Invasive Carcinoma in Area of EPE: Not  identified.  REGIONAL LYMPH NODES: (Specimens A, B & C).  Number of Lymph Nodes with Tumor: 0  Jeffrey Site(s) with Tumor: 0  Size of Largest Jeffrey Metastatic Deposit: N/A  Size of Largest Lymph Node with Tumor: N/A  Extranodal Extension: N/A  Number of Lymph Nodes Examined: 4.  DISTANT METASTASIS: No known distant metastasis.  Distant Site(s) Involved: N/A.  PATHOLOGIC STAGING (pTNM): pT3aN0  ADDITIONAL FINDINGS: High grade prostatic intraepithelial neoplasia (HGPIN).    COMMENT:  B, C). Immunohistochemical stains for *pancytokeratin were performed on all blocks and are negative,  supporting the above diagnosis.      JEROME EASON M.D.        Report attached.    Performing site:  [address] [address]    &quot;Disclaimer:  This case diagnosis was rendered completely by the outside consultation pathologist and the case is electronically signed by an [facility] listed below solely to release the report into the medical record.&quot;   Comment: Interp By Ana Manning M.D., Signed on 01/02/2024 at 15:10       Imaging  -    Assessment and Plan:   Prostate cancer  Rufus 7 3+4 GG2 pT3a pN0 cMx R0 KAT  RTC 4 months with psa to see Dr Miller    Incontinence; resolved  Noelle PATPT  Gemtesa  UA    ED  Cialis  VALERY  Trimix trial with Angela Finley NP

## 2024-06-17 ENCOUNTER — OFFICE VISIT (OUTPATIENT)
Dept: UROLOGY | Facility: CLINIC | Age: 69
End: 2024-06-17
Payer: MEDICARE

## 2024-06-17 VITALS
BODY MASS INDEX: 25.12 KG/M2 | WEIGHT: 179.44 LBS | HEIGHT: 71 IN | DIASTOLIC BLOOD PRESSURE: 73 MMHG | RESPIRATION RATE: 12 BRPM | SYSTOLIC BLOOD PRESSURE: 125 MMHG | HEART RATE: 76 BPM | OXYGEN SATURATION: 100 %

## 2024-06-17 DIAGNOSIS — N52.31 ERECTILE DYSFUNCTION AFTER RADICAL PROSTATECTOMY: Primary | ICD-10-CM

## 2024-06-17 DIAGNOSIS — C61 PROSTATE CANCER: Primary | ICD-10-CM

## 2024-06-17 PROCEDURE — 3288F FALL RISK ASSESSMENT DOCD: CPT | Mod: HCNC,CPTII,S$GLB, | Performed by: UROLOGY

## 2024-06-17 PROCEDURE — 1126F AMNT PAIN NOTED NONE PRSNT: CPT | Mod: HCNC,CPTII,S$GLB, | Performed by: UROLOGY

## 2024-06-17 PROCEDURE — 3074F SYST BP LT 130 MM HG: CPT | Mod: HCNC,CPTII,S$GLB, | Performed by: UROLOGY

## 2024-06-17 PROCEDURE — 1101F PT FALLS ASSESS-DOCD LE1/YR: CPT | Mod: HCNC,CPTII,S$GLB, | Performed by: UROLOGY

## 2024-06-17 PROCEDURE — 99214 OFFICE O/P EST MOD 30 MIN: CPT | Mod: HCNC,S$GLB,, | Performed by: UROLOGY

## 2024-06-17 PROCEDURE — 3008F BODY MASS INDEX DOCD: CPT | Mod: HCNC,CPTII,S$GLB, | Performed by: UROLOGY

## 2024-06-17 PROCEDURE — 3078F DIAST BP <80 MM HG: CPT | Mod: HCNC,CPTII,S$GLB, | Performed by: UROLOGY

## 2024-06-17 PROCEDURE — 1159F MED LIST DOCD IN RCRD: CPT | Mod: HCNC,CPTII,S$GLB, | Performed by: UROLOGY

## 2024-06-17 RX ORDER — PAPAVERINE HYDROCHLORIDE 30 MG/ML
INJECTION INTRAMUSCULAR; INTRAVENOUS
Qty: 10 ML | Refills: 12 | Status: SHIPPED | OUTPATIENT
Start: 2024-06-17

## 2024-06-17 NOTE — Clinical Note
Angela, please send Trimix Rx See Angela Finley NP for trimix trial next available See me 4 months with psa please

## 2024-06-28 NOTE — PROGRESS NOTES
Subjective:      Herbie Naqvi is a 68 y.o. male who returns today for trimix trial.    The patient is s/p RALP w BPLND on 12/20/23 with Dr. Miller.    Component      Latest Ref Rng 6/10/2024   PSA Diagnostic      0.00 - 4.00 ng/mL <0.01      He has ED that is refractory to oral agents. He presents today for trimix trial. Not on anticoagulants.     The following portions of the patient's history were reviewed and updated as appropriate: allergies, current medications, past family history, past medical history, past social history, past surgical history and problem list.    Review of Systems  Constitutional: no fever or chills  ENT: no nasal congestion or sore throat  Respiratory: no cough or shortness of breath  Cardiovascular: no chest pain or palpitations  Gastrointestinal: no nausea or vomiting, tolerating diet  Genitourinary: as per HPI  Hematologic/Lymphatic: no easy bruising or lymphadenopathy  Musculoskeletal: no arthralgias or myalgias  Neurological: no seizures or tremors  Behavioral/Psych: no auditory or visual hallucinations     Objective:   Vitals:   Vitals:    07/01/24 0941   BP: 138/82   Pulse: 75     Physical Exam   General: alert and oriented, no acute distress  Head: normocephalic, atraumatic  Neck: supple, normal ROM  Respiratory: Symmetric expansion, non-labored breathing  Cardiovascular: regular rate and rhythm  Abdomen: soft, non tender, non distended   Genitourinary:   Penis: normal, no lesions, patent orthotopic meatus, no plaques  Skin: normal coloration and turgor, no rashes, no suspicious skin lesions noted  Neuro: alert and oriented x3, no gross deficits  Psych: normal judgment and insight, normal mood/affect, and non-anxious    Lab Review   Urinalysis demonstrates : no specimen    Lab Results   Component Value Date    WBC 9.08 12/21/2023    HGB 11.8 (L) 12/21/2023    HCT 35.7 (L) 12/21/2023    MCV 90 12/21/2023     12/21/2023     Lab Results   Component Value Date    CREATININE 0.8  12/21/2023    BUN 12 12/21/2023     Lab Results   Component Value Date    PSA 5.9 (H) 12/13/2021     Imaging   None    Assessment:     1. Erectile dysfunction due to arterial insufficiency    2. Prostate cancer      Plan:   Diagnoses and all orders for this visit:    Erectile dysfunction due to arterial insufficiency    Prostate cancer Rufus 7 3+4 GG2 pT3a pN0 cMx R0 KAT     Plan:    He is here today for the Intracorporal injection/ICI education and first injection.  Educational materials were supplied.    ICI is an injectable medication that consists of three different medications to produce an erection. It is known as a Trimix Compound or PEP. The medication is a compound medication and is only mixed up at certain pharmacies known as a compound pharmacy. The medication has to be stored in the refrigerator. Improper storage decreases the effectiveness of the medication.     He was educated that it is an injection. With any injection there is risk for possible pain at the injection site as well as risk for an infection. Clean technique must be followed to help prevent infection. Proper needle disposable is necessary. He voices understanding.    The injection is best given when standing up and the penis is positioned perpendicular to the body. The urethral opening should be facing away from the body. Injection is always given on the lateral or side of the penis. Never give the injection to the top of the penis to avoid possible trauma to arteries and veins. Never give the injection to the bottom of the penis to avoid trauma to the urethra. He should alternate the injection sites to avoid the build up of scar tissue due to multiple injections.    This medication can increase the risk of erections lasting longer than 4 hours. This is known as a priapism and is a medical emergency. This requires immediate medical attention. This is main reason we give the first dose in the office today. We start at low dose and see  how well the patients reacts. We can increase the medication if needed depending on the reaction the patient receives today. We discussed the fine line between enough medication for penetration and too much leading to a priapism. He voices understanding.    He witnessed me draw up 0.1cc and I injected him in the right lateral aspect of the penis. Within 15 minutes he achieved a partial erection. Patient instructed to begin with 0.1cc (2 micrograms) and increase as needed up to 0.5cc (10 micrograms). Use no more than 3 times weekly. Rotate injection site. See instructions on package insert which we reviewed.     If have any questions, please give me a call. Educational materials were given to patient and all questions were answered. He voiced understanding and left the office without a priapism.     PT SUPPLIED THE MEDICATION    Follow up as previously scheduled with Dr. Miller

## 2024-07-01 ENCOUNTER — OFFICE VISIT (OUTPATIENT)
Dept: UROLOGY | Facility: CLINIC | Age: 69
End: 2024-07-01
Payer: MEDICARE

## 2024-07-01 VITALS
WEIGHT: 179.44 LBS | DIASTOLIC BLOOD PRESSURE: 82 MMHG | OXYGEN SATURATION: 96 % | BODY MASS INDEX: 25.12 KG/M2 | HEART RATE: 75 BPM | HEIGHT: 71 IN | SYSTOLIC BLOOD PRESSURE: 138 MMHG

## 2024-07-01 DIAGNOSIS — N52.01 ERECTILE DYSFUNCTION DUE TO ARTERIAL INSUFFICIENCY: Primary | ICD-10-CM

## 2024-07-01 DIAGNOSIS — C61 PROSTATE CANCER: ICD-10-CM

## 2024-07-01 NOTE — PATIENT INSTRUCTIONS
Penile Self-Injection Procedure  Dosage: 0.1-0.5 cc  Use no more than 3x/week   Self-injection is a good option if you have erectile dysfunction (ED). You insert a tiny needle into your penis and inject a medicine. This helps your penis get hard and stay that way long enough for sex. And sex and orgasm will feel as good as always. You may be nervous about doing self-injection at first. But with practice, it will get easier. Your healthcare provider will show you how to do self-injection the first time.  Talk to your doctor about any medicines you take and any medical problems you have.      Preparing for injection  Wash your hands well with soap and water.  Prepare the medicine (if needed).  Sit or  a comfortable position in a warm, well-lit room. If you need to, sit or  front of a mirror.  Find an injection site on one side of your penis, in a place with no visible veins. (Dont inject into the top, bottom, or head of the penis.)  Clean the injection site with an alcohol swab. Grasp the head of your penis firmly with your thumb and forefinger (dont just pinch the skin). Stretch the penis so the skin on the shaft is taut.  Injecting the medicine  Rest your penis against your inner thigh and pull it gently toward your knee. Dont twist or rotate it. This way youll be sure to inject the medicine into the spot you chose and cleaned before.  Hold the syringe between your thumb and fingers, like youre holding a pen. Rest your forearm on your thigh for support.  Insert the needle at a 90° angle (perpendicular) to the shaft. Do this quickly to reduce discomfort. (The needle should go in easily. If it doesnt, stop right away.)  Move your thumb to the plunger. Press down to inject the medicine, counting to 5.  Remove the needle and dispose of it safely.  Gaining an erection  Apply pressure to the injection site for a few minutes. This prevents swelling and bruising and helps spread the medicine.   Stand  up. This may help your erection develop. Foreplay often helps, too.  Your penis should become firm within 10 to 20 minutes. The erection will last long enough for sex, and maybe longer.  When to seek medical care  An erection that lasts longer than 3 to 4  hours  Bleeding or bruising  Severe pain  Scarring or curvature of the penis   Date Last Reviewed: 1/7/2017  © 9412-8074 [email or web address] [address] [place] [address], [place] [address]. All rights reserved. This information is not intended as a substitute for professional medical care. Always follow your healthcare professional's instructions.

## 2024-10-17 ENCOUNTER — LAB VISIT (OUTPATIENT)
Dept: LAB | Facility: OTHER | Age: 69
End: 2024-10-17
Attending: UROLOGY
Payer: MEDICARE

## 2024-10-17 DIAGNOSIS — C61 PROSTATE CANCER: ICD-10-CM

## 2024-10-17 LAB — COMPLEXED PSA SERPL-MCNC: <0.01 NG/ML (ref 0–4)

## 2024-10-17 PROCEDURE — 36415 COLL VENOUS BLD VENIPUNCTURE: CPT | Mod: HCNC | Performed by: UROLOGY

## 2024-10-17 PROCEDURE — 84153 ASSAY OF PSA TOTAL: CPT | Mod: HCNC | Performed by: UROLOGY

## 2024-10-21 NOTE — PROGRESS NOTES
Subjective:      Herbie Naqvi is a 69 y.o. male who returns today regarding his     Doing injections  Good response but uncomfortable.  '  Leakage resolved        The following portions of the patient's history were reviewed and updated as appropriate: allergies, current medications, past family history, past medical history, past social history, past surgical history and problem list.    Review of Systems  Pertinent items are noted in HPI.  A comprehensive multipoint review of systems was negative except as otherwise stated in the HPI.    Past Medical History:   Diagnosis Date    Prostate cancer      Past Surgical History:   Procedure Laterality Date    COLONOSCOPY N/A 11/30/2017    Procedure: COLONOSCOPY;  Surgeon: John Roman MD;  Location: [facility] ENDO;  Service: Endoscopy;  Laterality: N/A;  colonoscopy confirmed    ROBOT-ASSISTED LAPAROSCOPIC PROSTATECTOMY N/A 12/20/2023    Procedure: ROBOTIC PROSTATECTOMY;  Surgeon: Armani Miller MD;  Location: [facility];  Service: Urology;  Laterality: N/A;    wrist broke       rt side wrist       Review of patient's allergies indicates:  No Known Allergies       Objective:   Vitals: There were no vitals taken for this visit.    Physical Exam   General: alert and oriented, no acute distress  Respiratory: Symmetric expansion, non-labored breathing  Cardiovascular: no peripheral edema  Abdomen: non distended  Skin: normal coloration and turgor, no rashes, no suspicious skin lesions noted  Neuro: no gross deficits  Psych: normal judgment and insight, normal mood/affect, and non-anxious  Penis no scarring   No plaques    Physical Exam    Lab Review   Urinalysis demonstrates no fluid    Lab Results   Component Value Date    WBC 9.08 12/21/2023    HGB 11.8 (L) 12/21/2023    HCT 35.7 (L) 12/21/2023    MCV 90 12/21/2023     12/21/2023     Lab Results   Component Value Date    CREATININE 0.8 12/21/2023    BUN 12 12/21/2023     Lab Results   Component Value Date    PSA 5.9  (H) 12/13/2021    PSA 2.4 10/05/2020    PSA 1.7 03/19/2019    PSADIAG <0.01 10/17/2024    PSADIAG <0.01 06/10/2024    PSADIAG 14.9 (H) 03/21/2023    PSATOTAL 6.9 (H) 12/30/2021    PSAFREE 0.74 12/30/2021    PSAFREEPCT 10.72 12/30/2021         Imaging  -    Assessment and Plan:   Prostate cancer  Rufus 7 3+4 GG2 pT3a pN0 cMx R0 KAT  RTC 6 months with psa to see NP     Incontinence; resolved  Kejose       ED  Bimix  Will remove alprostadil from Trimix due to discomfort

## 2024-10-22 ENCOUNTER — OFFICE VISIT (OUTPATIENT)
Dept: UROLOGY | Facility: CLINIC | Age: 69
End: 2024-10-22
Payer: MEDICARE

## 2024-10-22 VITALS
BODY MASS INDEX: 25.12 KG/M2 | SYSTOLIC BLOOD PRESSURE: 129 MMHG | HEART RATE: 75 BPM | WEIGHT: 179.44 LBS | RESPIRATION RATE: 18 BRPM | DIASTOLIC BLOOD PRESSURE: 74 MMHG | HEIGHT: 71 IN

## 2024-10-22 DIAGNOSIS — C61 PROSTATE CANCER: Primary | ICD-10-CM

## 2024-10-22 DIAGNOSIS — N52.31 ERECTILE DYSFUNCTION AFTER RADICAL PROSTATECTOMY: ICD-10-CM

## 2024-10-22 PROCEDURE — 3078F DIAST BP <80 MM HG: CPT | Mod: HCNC,CPTII,S$GLB, | Performed by: UROLOGY

## 2024-10-22 PROCEDURE — 99214 OFFICE O/P EST MOD 30 MIN: CPT | Mod: HCNC,S$GLB,, | Performed by: UROLOGY

## 2024-10-22 PROCEDURE — 1101F PT FALLS ASSESS-DOCD LE1/YR: CPT | Mod: HCNC,CPTII,S$GLB, | Performed by: UROLOGY

## 2024-10-22 PROCEDURE — 3288F FALL RISK ASSESSMENT DOCD: CPT | Mod: HCNC,CPTII,S$GLB, | Performed by: UROLOGY

## 2024-10-22 PROCEDURE — 1126F AMNT PAIN NOTED NONE PRSNT: CPT | Mod: HCNC,CPTII,S$GLB, | Performed by: UROLOGY

## 2024-10-22 PROCEDURE — 1159F MED LIST DOCD IN RCRD: CPT | Mod: HCNC,CPTII,S$GLB, | Performed by: UROLOGY

## 2024-10-22 PROCEDURE — 3008F BODY MASS INDEX DOCD: CPT | Mod: HCNC,CPTII,S$GLB, | Performed by: UROLOGY

## 2024-10-22 PROCEDURE — 3074F SYST BP LT 130 MM HG: CPT | Mod: HCNC,CPTII,S$GLB, | Performed by: UROLOGY

## 2024-10-22 RX ORDER — PAPAVERINE HYDROCHLORIDE 30 MG/ML
INJECTION INTRAMUSCULAR; INTRAVENOUS
Qty: 10 ML | Refills: 12 | Status: SHIPPED | OUTPATIENT
Start: 2024-10-22

## 2024-10-22 NOTE — Clinical Note
Angela, can you send in a Rx for Bimix please?  Same formulation as Trimix, just remove the alprostadil please.  Thanks!

## 2024-10-30 DIAGNOSIS — H40.1132 PRIMARY OPEN ANGLE GLAUCOMA (POAG) OF BOTH EYES, MODERATE STAGE: ICD-10-CM

## 2024-10-31 RX ORDER — LATANOPROST 50 UG/ML
1 SOLUTION/ DROPS OPHTHALMIC
Qty: 12 ML | Refills: 0 | Status: SHIPPED | OUTPATIENT
Start: 2024-10-31

## 2025-01-30 ENCOUNTER — OFFICE VISIT (OUTPATIENT)
Dept: FAMILY MEDICINE | Facility: CLINIC | Age: 70
End: 2025-01-30
Payer: MEDICARE

## 2025-01-30 ENCOUNTER — LAB VISIT (OUTPATIENT)
Dept: LAB | Facility: HOSPITAL | Age: 70
End: 2025-01-30
Payer: MEDICARE

## 2025-01-30 VITALS
WEIGHT: 179.88 LBS | RESPIRATION RATE: 16 BRPM | OXYGEN SATURATION: 96 % | HEART RATE: 79 BPM | HEIGHT: 71 IN | BODY MASS INDEX: 25.18 KG/M2 | DIASTOLIC BLOOD PRESSURE: 88 MMHG | SYSTOLIC BLOOD PRESSURE: 128 MMHG | TEMPERATURE: 98 F

## 2025-01-30 DIAGNOSIS — C61 PROSTATE CANCER: ICD-10-CM

## 2025-01-30 DIAGNOSIS — Z23 NEED FOR VACCINATION: ICD-10-CM

## 2025-01-30 DIAGNOSIS — H40.1132 PRIMARY OPEN ANGLE GLAUCOMA (POAG) OF BOTH EYES, MODERATE STAGE: ICD-10-CM

## 2025-01-30 DIAGNOSIS — Z00.00 ANNUAL PHYSICAL EXAM: Primary | ICD-10-CM

## 2025-01-30 DIAGNOSIS — R73.03 PREDIABETES: ICD-10-CM

## 2025-01-30 DIAGNOSIS — Z13.220 ENCOUNTER FOR LIPID SCREENING FOR CARDIOVASCULAR DISEASE: ICD-10-CM

## 2025-01-30 DIAGNOSIS — Z13.6 ENCOUNTER FOR LIPID SCREENING FOR CARDIOVASCULAR DISEASE: ICD-10-CM

## 2025-01-30 DIAGNOSIS — Z12.11 ENCOUNTER FOR SCREENING FOR MALIGNANT NEOPLASM OF COLON: ICD-10-CM

## 2025-01-30 LAB
ALBUMIN SERPL BCP-MCNC: 4.2 G/DL (ref 3.5–5.2)
ALP SERPL-CCNC: 71 U/L (ref 40–150)
ALT SERPL W/O P-5'-P-CCNC: 31 U/L (ref 10–44)
ANION GAP SERPL CALC-SCNC: 9 MMOL/L (ref 8–16)
AST SERPL-CCNC: 27 U/L (ref 10–40)
BASOPHILS # BLD AUTO: 0.05 K/UL (ref 0–0.2)
BASOPHILS NFR BLD: 1.1 % (ref 0–1.9)
BILIRUB SERPL-MCNC: 0.5 MG/DL (ref 0.1–1)
BUN SERPL-MCNC: 11 MG/DL (ref 8–23)
CALCIUM SERPL-MCNC: 9.7 MG/DL (ref 8.7–10.5)
CHLORIDE SERPL-SCNC: 105 MMOL/L (ref 95–110)
CHOLEST SERPL-MCNC: 173 MG/DL (ref 120–199)
CHOLEST/HDLC SERPL: 2.8 {RATIO} (ref 2–5)
CO2 SERPL-SCNC: 25 MMOL/L (ref 23–29)
COMPLEXED PSA SERPL-MCNC: <0.01 NG/ML (ref 0–4)
CREAT SERPL-MCNC: 0.7 MG/DL (ref 0.5–1.4)
DIFFERENTIAL METHOD BLD: ABNORMAL
EOSINOPHIL # BLD AUTO: 0.1 K/UL (ref 0–0.5)
EOSINOPHIL NFR BLD: 1.8 % (ref 0–8)
ERYTHROCYTE [DISTWIDTH] IN BLOOD BY AUTOMATED COUNT: 14.2 % (ref 11.5–14.5)
EST. GFR  (NO RACE VARIABLE): >60 ML/MIN/1.73 M^2
ESTIMATED AVG GLUCOSE: 123 MG/DL (ref 68–131)
GLUCOSE SERPL-MCNC: 84 MG/DL (ref 70–110)
HBA1C MFR BLD: 5.9 % (ref 4–5.6)
HCT VFR BLD AUTO: 42.8 % (ref 40–54)
HDLC SERPL-MCNC: 61 MG/DL (ref 40–75)
HDLC SERPL: 35.3 % (ref 20–50)
HGB BLD-MCNC: 14.1 G/DL (ref 14–18)
IMM GRANULOCYTES # BLD AUTO: 0.01 K/UL (ref 0–0.04)
IMM GRANULOCYTES NFR BLD AUTO: 0.2 % (ref 0–0.5)
LDLC SERPL CALC-MCNC: 98.4 MG/DL (ref 63–159)
LYMPHOCYTES # BLD AUTO: 2.2 K/UL (ref 1–4.8)
LYMPHOCYTES NFR BLD: 49.6 % (ref 18–48)
MCH RBC QN AUTO: 30.3 PG (ref 27–31)
MCHC RBC AUTO-ENTMCNC: 32.9 G/DL (ref 32–36)
MCV RBC AUTO: 92 FL (ref 82–98)
MONOCYTES # BLD AUTO: 0.4 K/UL (ref 0.3–1)
MONOCYTES NFR BLD: 9.1 % (ref 4–15)
NEUTROPHILS # BLD AUTO: 1.7 K/UL (ref 1.8–7.7)
NEUTROPHILS NFR BLD: 38.2 % (ref 38–73)
NONHDLC SERPL-MCNC: 112 MG/DL
NRBC BLD-RTO: 0 /100 WBC
PLATELET # BLD AUTO: 220 K/UL (ref 150–450)
PMV BLD AUTO: 9.7 FL (ref 9.2–12.9)
POTASSIUM SERPL-SCNC: 3.9 MMOL/L (ref 3.5–5.1)
PROT SERPL-MCNC: 7.3 G/DL (ref 6–8.4)
RBC # BLD AUTO: 4.65 M/UL (ref 4.6–6.2)
SODIUM SERPL-SCNC: 139 MMOL/L (ref 136–145)
TRIGL SERPL-MCNC: 68 MG/DL (ref 30–150)
WBC # BLD AUTO: 4.52 K/UL (ref 3.9–12.7)

## 2025-01-30 PROCEDURE — 3079F DIAST BP 80-89 MM HG: CPT | Mod: HCNC,CPTII,S$GLB,

## 2025-01-30 PROCEDURE — 3288F FALL RISK ASSESSMENT DOCD: CPT | Mod: HCNC,CPTII,S$GLB,

## 2025-01-30 PROCEDURE — 90480 ADMN SARSCOV2 VAC 1/ONLY CMP: CPT | Mod: HCNC,S$GLB,,

## 2025-01-30 PROCEDURE — 36415 COLL VENOUS BLD VENIPUNCTURE: CPT | Mod: HCNC,PO

## 2025-01-30 PROCEDURE — 80061 LIPID PANEL: CPT | Mod: HCNC

## 2025-01-30 PROCEDURE — 80053 COMPREHEN METABOLIC PANEL: CPT | Mod: HCNC

## 2025-01-30 PROCEDURE — 99397 PER PM REEVAL EST PAT 65+ YR: CPT | Mod: 25,HCNC,S$GLB,

## 2025-01-30 PROCEDURE — 85025 COMPLETE CBC W/AUTO DIFF WBC: CPT | Mod: HCNC

## 2025-01-30 PROCEDURE — 1126F AMNT PAIN NOTED NONE PRSNT: CPT | Mod: HCNC,CPTII,S$GLB,

## 2025-01-30 PROCEDURE — 3008F BODY MASS INDEX DOCD: CPT | Mod: HCNC,CPTII,S$GLB,

## 2025-01-30 PROCEDURE — 3074F SYST BP LT 130 MM HG: CPT | Mod: HCNC,CPTII,S$GLB,

## 2025-01-30 PROCEDURE — 1159F MED LIST DOCD IN RCRD: CPT | Mod: HCNC,CPTII,S$GLB,

## 2025-01-30 PROCEDURE — 84153 ASSAY OF PSA TOTAL: CPT | Mod: HCNC

## 2025-01-30 PROCEDURE — 1101F PT FALLS ASSESS-DOCD LE1/YR: CPT | Mod: HCNC,CPTII,S$GLB,

## 2025-01-30 PROCEDURE — 1160F RVW MEDS BY RX/DR IN RCRD: CPT | Mod: HCNC,CPTII,S$GLB,

## 2025-01-30 PROCEDURE — 83036 HEMOGLOBIN GLYCOSYLATED A1C: CPT | Mod: HCNC

## 2025-01-30 PROCEDURE — 99999 PR PBB SHADOW E&M-EST. PATIENT-LVL IV: CPT | Mod: PBBFAC,HCNC,,

## 2025-01-30 PROCEDURE — 91320 SARSCV2 VAC 30MCG TRS-SUC IM: CPT | Mod: HCNC,S$GLB,,

## 2025-01-30 NOTE — PROGRESS NOTES
Family Medicine     Patient name: Herbie Naqvi  MRN: [id number]  : 1955  PCP NAME: Edwin Lawson MD    Subjective     History of Present Illness:  Patient ID: Herbie Naqvi is a 69 y.o. Black or  male presents to the clinic today. Chronic medical issues, if present, have been documented.   Active Problem List with Overview Notes    Diagnosis Date Noted    Primary open angle glaucoma (POAG) of both eyes, moderate stage 2025    Nocturia 2023    Prediabetes 10/27/2023    Prostate cancer 10/27/2023     S/P robotic prostatectomy - 2023      Anemia 10/27/2023    Vitamin D insufficiency 2021    Personal history of other endocrine, nutritional and metabolic disease 2021    Primary osteoarthritis of right knee 12/10/2021    Complex tear of medial meniscus of right knee as current injury 2018       Chief Complaint  Chief Complaint   Patient presents with    Follow-up    Colonoscopy     Requesting order     Presents for annual physical exam.  Feels at baseline today.  Has history of prostate cancer that was treated with robotic prostatectomy.  Has residual erectile dysfunction.  Has not been able to obtain relief for ED symptoms.  Most recently, he was prescribed injectable vasodilators by urology with mixed results.  Still has some urinary symptoms including incomplete emptying, but denies hesitancy or straining  Still working in construction.   Has occasionally knee pain which is normal . Doesn't need meloxicam at the moment.  No recent falls.  Lives with his wife who is also my patient    Medications   Medication List with Changes/Refills   Current Medications    LATANOPROST 0.005 % OPHTHALMIC SOLUTION    INSTILL 1 DROP INTO EACH EYE ONCE DAILY    MELOXICAM (MOBIC) 15 MG TABLET    Take 1 tablet (15 mg total) by mouth once daily.    MULTIVITAMIN WITH MINERALS TABLET    Take 1 tablet by mouth once daily.    OMEGA-3 FATTY ACIDS/FISH OIL (FISH  OIL-OMEGA-3 FATTY ACIDS) 300-1,000 MG CAPSULE    Take by mouth once daily.    PAPAVERINE 30 MG/ML INJECTION    Add phentolamine 10 mgs/ml    TADALAFIL (CIALIS) 20 MG TAB    Take 1 tablet (20 mg total) by mouth every 72 hours as needed (ED).   Discontinued Medications    OXYCODONE (ROXICODONE) 5 MG IMMEDIATE RELEASE TABLET    Take 1 tablet (5 mg total) by mouth every 4 (four) hours as needed for Pain.    POLYETHYLENE GLYCOL (GLYCOLAX) 17 GRAM/DOSE POWDER    Take 17 g by mouth once daily.    TAMSULOSIN (FLOMAX) 0.4 MG CAP    Take 1 capsule by mouth once daily       Allergies  Review of patient's allergies indicates:  No Known Allergies  Past Medical history  Past Medical History:   Diagnosis Date    Prostate cancer           Surgical History  Past Surgical History:   Procedure Laterality Date    COLONOSCOPY N/A 11/30/2017    Procedure: COLONOSCOPY;  Surgeon: John Roman MD;  Location: [facility] ENDO;  Service: Endoscopy;  Laterality: N/A;  colonoscopy confirmed    ROBOT-ASSISTED LAPAROSCOPIC PROSTATECTOMY N/A 12/20/2023    Procedure: ROBOTIC PROSTATECTOMY;  Surgeon: Armani Miller MD;  Location: [facility];  Service: Urology;  Laterality: N/A;    wrist broke       rt side wrist     Family History  Family History   Problem Relation Name Age of Onset    Diabetes Mother age 60     Heart disease Father age 57     Hyperlipidemia Father age 57     Cancer Daughter          breast     Social History  Social History     Tobacco Use    Smoking status: Former     Types: Cigarettes     Start date: 3/25/2014    Smokeless tobacco: Never   Substance Use Topics    Alcohol use: No     Comment: former drinker social stop drinking    Drug use: No          Review of system     ROS  Negative except as mentioned above  Physical exam   Vital Signs  Vitals:    01/30/25 1405   BP: 128/88   BP Location: Right arm   Patient Position: Sitting   Pulse: 79   Resp: 16   Temp: 98 °F (36.7 °C)   TempSrc: Oral   SpO2: 96%   Weight: 81.6 kg (179 lb  "14.3 oz)   Height: 5' 11" (1.803 m)       Physical Exam  Constitutional:       General: He is not in acute distress.     Appearance: He is not ill-appearing.   HENT:      Head: Normocephalic.   Cardiovascular:      Rate and Rhythm: Normal rate and regular rhythm.      Pulses: Normal pulses.      Heart sounds: Normal heart sounds. No murmur heard.     No gallop.   Pulmonary:      Effort: Pulmonary effort is normal. No respiratory distress.      Breath sounds: Normal breath sounds. No wheezing.   Abdominal:      General: There is no distension.      Palpations: Abdomen is soft.      Tenderness: There is no abdominal tenderness.   Musculoskeletal:      Right lower leg: No edema.      Left lower leg: No edema.   Neurological:      Mental Status: He is alert and oriented to person, place, and time.   Psychiatric:         Mood and Affect: Mood normal.         Wt Readings from Last 3 Encounters:   01/30/25 81.6 kg (179 lb 14.3 oz)   10/22/24 81.4 kg (179 lb 7.3 oz)   07/01/24 81.4 kg (179 lb 7.3 oz)          Body mass index is 25.09 kg/m².      Laboratory data and other diagnostic findings     Lab Results   Component Value Date    WBC 9.08 12/21/2023    HGB 11.8 (L) 12/21/2023    HCT 35.7 (L) 12/21/2023    MCV 90 12/21/2023     12/21/2023         Lab Results   Component Value Date    CREATININE 0.8 12/21/2023    BUN 12 12/21/2023     12/21/2023    K 4.2 12/21/2023     12/21/2023    CO2 24 12/21/2023         Assessment and plan       Annual physical exam    Primary open angle glaucoma (POAG) of both eyes, moderate stage    Prostate cancer  -     Comprehensive Metabolic Panel; Future; Expected date: 01/30/2025  -     CBC Auto Differential; Future; Expected date: 01/30/2025  -     PROSTATE SPECIFIC ANTIGEN, DIAGNOSTIC; Future; Expected date: 01/30/2025    Encounter for screening for malignant neoplasm of colon  -     Ambulatory referral/consult to Endo Procedure ; Future; Expected date: " 01/31/2025    Need for vaccination  -     COVID-19 (Pfizer) 30 mcg/0.3 mL IM vaccine (>/= 11 yo) 0.3 mL    Prediabetes  -     Hemoglobin A1C; Future; Expected date: 01/30/2025    Encounter for lipid screening for cardiovascular disease  -     Lipid Panel; Future; Expected date: 01/30/2025         Clinically stable.  Blood work today.  Follow-up with Urology.    Problem List Items Addressed This Visit       Prediabetes    Relevant Orders    Hemoglobin A1C    Prostate cancer    Overview     S/P robotic prostatectomy - December 2023         Relevant Orders    Comprehensive Metabolic Panel    CBC Auto Differential    PROSTATE SPECIFIC ANTIGEN, DIAGNOSTIC    Primary open angle glaucoma (POAG) of both eyes, moderate stage     Other Visit Diagnoses       Annual physical exam    -  Primary    Encounter for screening for malignant neoplasm of colon        Relevant Orders    Ambulatory referral/consult to Endo Procedure     Need for vaccination        Relevant Medications    COVID-19 (Pfizer) 30 mcg/0.3 mL IM vaccine (>/= 11 yo) 0.3 mL (Completed)    Encounter for lipid screening for cardiovascular disease        Relevant Orders    Lipid Panel                Medications Administered this visit  Administrations This Visit       COVID-19 (Pfizer) 30 mcg/0.3 mL IM vaccine (>/= 11 yo) 0.3 mL       Admin Date  01/30/2025 Action  Given Dose  0.3 mL Route  Intramuscular Documented By  Juana Werner MA                     Future Appointments  Future Appointments   Date Time Provider Department Center   4/14/2025  9:00 AM Mormon, [facility]   4/22/2025  9:30 AM Angela Finley NP [facility]   1/30/2026  9:00 AM Edwin Lawson MD [facility]         Follow up in about 1 year (around 1/30/2026). and PRN.      Edwin Lawson MD    This office note was created by combination of typed  and MModal dictation.  Transcription errors may be present.  If  there are any questions, please contact me.

## 2025-02-07 ENCOUNTER — TELEPHONE (OUTPATIENT)
Dept: FAMILY MEDICINE | Facility: CLINIC | Age: 70
End: 2025-02-07
Payer: MEDICARE

## 2025-02-07 NOTE — TELEPHONE ENCOUNTER
----- Message from Edwin Lawson MD sent at 2/6/2025  4:35 PM CST -----  Please inform patient that blood count, kidney function and cholesterol levels are normal, sugar levels are stable for age and his PSA levels were normal.

## 2025-02-24 DIAGNOSIS — Z00.00 ENCOUNTER FOR MEDICARE ANNUAL WELLNESS EXAM: ICD-10-CM

## 2025-04-03 ENCOUNTER — CLINICAL SUPPORT (OUTPATIENT)
Dept: ENDOSCOPY | Facility: HOSPITAL | Age: 70
End: 2025-04-03
Payer: MEDICARE

## 2025-04-03 ENCOUNTER — TELEPHONE (OUTPATIENT)
Dept: ENDOSCOPY | Facility: HOSPITAL | Age: 70
End: 2025-04-03

## 2025-04-03 VITALS — WEIGHT: 180 LBS | HEIGHT: 71 IN | BODY MASS INDEX: 25.2 KG/M2

## 2025-04-03 DIAGNOSIS — Z12.11 SPECIAL SCREENING FOR MALIGNANT NEOPLASMS, COLON: Primary | ICD-10-CM

## 2025-04-03 DIAGNOSIS — Z12.11 ENCOUNTER FOR SCREENING FOR MALIGNANT NEOPLASM OF COLON: ICD-10-CM

## 2025-04-03 NOTE — TELEPHONE ENCOUNTER
Colonoscopy Procedure Prep Instructions    Date of procedure: 5/16/25 Arrive at: 10:15 AM    Location of Department:   [facility] [address] [place] [address]  Take the [facility] Elevators to 4th Floor Endoscopy Lab    As soon as possible:   your prep from pharmacy and over the counter DULCOLAX LAXATIVE TABLETS            On the day before your procedure   What You CAN do:   You may have clear liquids ONLY-see below for list.     Liquids That Are OK to Drink:   Water  Sports drinks (Gatorade, Power-Aid)  Coffee or tea (no cream or nondairy creamer)  Clear juices without pulp (apple, white grape)  Gelatin desserts (no fruit or toppings)  Clear soda (sprite, coke, ginger ale)  Chicken broth (until 12 midnight the night before procedure)    What You CANNOT do:   Do not EAT solid food, drink milk or anything   colored red.  Do not drink alcohol.  Do not take oral medications within 1 hour of starting   each dose of prep.  No gum chewing or candy morning of procedure                       Note:   (Please disregard the insert instructions from pharmacy).  PEG Bowel Prep is indicated for cleansing of the colon as a preparation for colonoscopy in adults.   Be sure to tell your doctor about all the medicines you take, including prescription and non-prescription medicines, vitamins, and herbal supplements. PEG Bowel Prep may affect how other medicines work.  Medication taken by mouth may not be absorbed properly when taken within 1 hour before the start of each dose of PEG Bowel Prep.    It is not uncommon to experience some abdominal cramping, nausea and/or vomiting when taking the prep. If you have nausea and/or vomiting while taking the prep, stop drinking for 20 to 30 minutes then continue.      How to take prep:    PEG Bowel Prep is a (2-day) prep.    One (1) bottle of prep are required for a complete preparation for colonoscopy. Dilute the solution concentrate as directed prior to  use. You must drink water with each dose of prep, and additional water after each dose.    DOSE 1--Day Before Colonoscopy 5/15/25     Drink at least 6 to 8 glasses of clear liquids from time you wake up until you begin your prep and then continue until bedtime to avoid dehydration.     12:00 pm (NOON) Mix your entire container of prep with lukewarm water and refrigerate. Take four (4) Dulcolax (Bisacodyl) tablets with at least 8 ounces or more of clear liquids.       6:00 pm:    You must complete Steps 1 and 2 below before going to bed:    Step 1-Drink half the liquid in the container within one (1) hour.   Step 2-Refrigerate the remaining half of the liquid for dose 2. See below when to begin this step.                       IMPORTANT: If you experience preparation-related symptoms (for example, nausea, bloating, or cramping), stop, or slow the rate of drinking the additional water until your symptoms decrease.    DOSE 2--Day of the Colonoscopy 5/16/25 at 2-3 AM.    For this dose, repeat Step 1 shown above using the remaining half of the liquid prep.   You may continue drinking water/clear liquids until   4 hours before your colonoscopy or as directed by the scheduling nurse  8:15 AM.      For information about your procedure, two (2) things to view prior to colonoscopy:  Please watch this informational video. It is important to watch this animated consent video prior to your arrival. If you haven't watched the video prior to arriving, you are required to watch it during admission which can causes delays.    Options for viewing:   Using a keyboard:  press and hold the control tab (Ctrl) and left mouse click to follow links.           Colonoscopy Instructional Video                                                                                   OR    Type link address into your web browser's address bar:  [email or web address]      Educational Booklet with pictures:      Colonoscopy Prep -  Liquid      Comments:           IMPORTANT INFORMATION TO KNOW BEFORE YOUR PROCEDURE    [facility] 4th Floor         If your procedure requires the administration of anesthesia, it is necessary for a responsible adult to drive you home. (Medical Transportation, Uber, Lyft, Taxi, etc. may ONLY be used if a responsible adult is present to accompany you home.  The responsible adult CAN'T be the  of the service).      person must be available to return to pick you up within 15 minutes of being notified of discharge.       Please bring a picture ID, insurance card, & copayment      Take Medications as directed below:      If you begin taking any blood thinning medications, injectable weight loss/diabetes medications (other than insulin) , or Adipex (Phentermine) please contact the endoscopy scheduling department listed below as soon as possible.    If you are diabetic see the attached instruction sheet regarding your medication.     If you take HEART, BLOOD PRESSURE, SEIZURE, PAIN, LUNG (including inhalers/nebulizers), ANTI-REJECTION (transplant patients), or PSYCHIATRIC medications, please take at your regular times with a sip of water or as directed by the scheduling nurse.     Important contact information:    Endoscopy [phone] Hours of operation Monday-Friday 8:00-4:30pm.    Questions about insurance or financial obligations call [phone] or [phone].    If you have questions regarding the prep or need to reschedule, please call [phone]. After hours questions requiring immediate assistance, contact [facility] nurse line at [phone] or [phone].   NOTE:     On occasion, unforeseen circumstances may cause a delay in your procedure start time. We respect your time and appreciate your patience during these circumstances.      Comments:

## 2025-04-03 NOTE — TELEPHONE ENCOUNTER
Referral for procedure from PAT appointment      Spoke to patient to schedule procedure(s) Colonoscopy       Physician to perform procedure(s) Dr. Bhatt   Date of Procedure (s) 5/16/25  Arrival Time 10:15 AM  Time of Procedure(s) 11:15 AM   Location of Procedure(s) [facility] 4th Floor  Type of Rx Prep sent to patient: PEG  Instructions provided to patient via [facility]    Patient was informed on the following information and verbalized understanding. Screening questionnaire reviewed with patient and complete. If procedure requires anesthesia, a responsible adult needs to be present to accompany the patient home, patient cannot drive after receiving anesthesia. Appointment details are tentative, especially check-in time. Patient will receive a prep-op call 7 days prior to confirm check-in time for procedure. If applicable the patient should contact their pharmacy to verify Rx for procedure prep is ready for pick-up. Patient was advised to call the scheduling department at [phone] if pharmacy states no Rx is available. Patient was advised to call the endoscopy scheduling department if any questions or concerns arise.      [facility]oscopy Scheduling Department

## 2025-04-17 ENCOUNTER — TELEPHONE (OUTPATIENT)
Dept: FAMILY MEDICINE | Facility: CLINIC | Age: 70
End: 2025-04-17
Payer: MEDICARE

## 2025-04-17 NOTE — TELEPHONE ENCOUNTER
Advised pt an OV is needed, pt declined a same day appt stating he will call back.    [facility] Care Plan    POC reviewed with Дмитрий Carlos Tatum and family at 1300. Pt and family verbalized understanding. Questions and concerns addressed. No acute events today. Pt progressing toward goals. Will continue to monitor. See below and flowsheets for full assessment and VS info.     Incision dressing without drainage  Denies pain  MRI completed  Passed deluna- regular diet    Neuro:  Patrick Coma Scale  Best Eye Response: 4-->(E4) spontaneous  Best Motor Response: 6-->(M6) obeys commands  Best Verbal Response: 5-->(V5) oriented  Sutton Coma Scale Score: 15  Assessment Qualifiers: patient not sedated/intubated        24 hr Temp:  [97.2 °F (36.2 °C)-98.7 °F (37.1 °C)]     CV:   Rhythm: normal sinus rhythm  BP goals:   SBP < 160  MAP > 65    Resp:   O2 Device (Oxygen Therapy): room air       Plan: room air    GI/:     Diet/Nutrition Received: regular  Last Bowel Movement: 02/16/22       Intake/Output Summary (Last 24 hours) at 2/17/2022 1859  Last data filed at 2/17/2022 1505  Gross per 24 hour   Intake 2158.06 ml   Output 1500 ml   Net 658.06 ml     Unmeasured Output  Stool Occurrence: 0    Labs/Accuchecks:  Recent Labs   Lab 02/11/22  0947 02/17/22  0752 02/17/22  0955   WBC 6.39  --   --    RBC 5.18  --   --    HGB 15.2  --   --    HCT 45.9   < > 33*     --   --     < > = values in this interval not displayed.      Recent Labs   Lab 02/11/22  0947      K 4.6   CO2 26      BUN 9   CREATININE 0.9   ALKPHOS 113   ALT 63*   AST 34   BILITOT 0.5      Recent Labs   Lab 02/11/22  0947   INR 1.0   APTT 27.7    No results for input(s): CPK, CPKMB, TROPONINI, MB in the last 168 hours.    Electrolytes: na  Accuchecks:none needed    Gtts:       LDA/Wounds:  Lines/Drains/Airways       Arterial Line              Arterial Line 02/17/22 0728 Left Radial <1 day              Peripheral Intravenous Line                   Peripheral IV - Single Lumen 02/17/22 0528 18 G Left;Posterior Forearm <1 day                   Wounds: frontal crani incision  Wound care consulted: no

## 2025-04-17 NOTE — TELEPHONE ENCOUNTER
----- Message from Med Assistant Galen sent at 4/17/2025  8:31 AM CDT -----  Type: Patient Call BackWho called:Spouse What is the request in detail: states the pt. Has a sore throat and is asking if something can be called in for him.. Can the clinic reply by [facility]?NoWould the patient rather a call back or a response via [facility]? Yes, [facility] call back number: [phone]

## 2025-04-21 DIAGNOSIS — H40.1132 PRIMARY OPEN ANGLE GLAUCOMA (POAG) OF BOTH EYES, MODERATE STAGE: ICD-10-CM

## 2025-04-22 ENCOUNTER — OFFICE VISIT (OUTPATIENT)
Dept: UROLOGY | Facility: CLINIC | Age: 70
End: 2025-04-22
Payer: MEDICARE

## 2025-04-22 ENCOUNTER — TELEPHONE (OUTPATIENT)
Dept: UROLOGY | Facility: CLINIC | Age: 70
End: 2025-04-22

## 2025-04-22 VITALS
WEIGHT: 179.88 LBS | BODY MASS INDEX: 25.18 KG/M2 | SYSTOLIC BLOOD PRESSURE: 144 MMHG | DIASTOLIC BLOOD PRESSURE: 79 MMHG | HEIGHT: 71 IN | HEART RATE: 80 BPM | RESPIRATION RATE: 18 BRPM

## 2025-04-22 DIAGNOSIS — C61 PROSTATE CANCER: ICD-10-CM

## 2025-04-22 DIAGNOSIS — N52.01 ERECTILE DYSFUNCTION DUE TO ARTERIAL INSUFFICIENCY: Primary | ICD-10-CM

## 2025-04-22 RX ORDER — LATANOPROST 50 UG/ML
1 SOLUTION/ DROPS OPHTHALMIC
Qty: 12 ML | Refills: 0 | Status: SHIPPED | OUTPATIENT
Start: 2025-04-22

## 2025-04-22 RX ORDER — PAPAVERINE HYDROCHLORIDE 30 MG/ML
INJECTION INTRAMUSCULAR; INTRAVENOUS
Qty: 10 ML | Refills: 12 | Status: SHIPPED | OUTPATIENT
Start: 2025-04-22 | End: 2025-04-25 | Stop reason: SDUPTHER

## 2025-04-22 NOTE — Clinical Note
Please schedule a visit with Dr. Potter to discuss IPP. Follow up with Dr. Miller in 6 months with PSA. Thanks!

## 2025-04-22 NOTE — PROGRESS NOTES
Subjective:      Herbie Naqvi is a 69 y.o. male who returns today regarding his prostate cancer.     The patient is s/p RALP w BPLND on 23 with Dr. Miller- Rufus 7 3+4 GG2 pT3a pN0 cMx R0 KAT       PSA Diagnostic Prostate Specific Antigen   Latest Ref Rng 0.00 - 4.00 ng/mL <=4.00 ng/mL   6/10/2024 <0.01     10/17/2024 <0.01     2025 <0.01     2025  <0.01      Good response with trimix in the past- rx . Switched to bimix for comfort- however no response with injection.     Voiding well. Denies incontinence of urine.     The following portions of the patient's history were reviewed and updated as appropriate: allergies, current medications, past family history, past medical history, past social history, past surgical history and problem list.    Review of Systems  Constitutional: no fever or chills  ENT: no nasal congestion or sore throat  Respiratory: no cough or shortness of breath  Cardiovascular: no chest pain or palpitations  Gastrointestinal: no nausea or vomiting, tolerating diet  Genitourinary: as per HPI  Hematologic/Lymphatic: no easy bruising or lymphadenopathy  Musculoskeletal: no arthralgias or myalgias  Neurological: no seizures or tremors  Behavioral/Psych: no auditory or visual hallucinations     Objective:   Vitals:   Vitals:    25 0928   BP: (!) 144/79   Pulse: 80   Resp: 18     Physical Exam   General: alert and oriented, no acute distress  Head: normocephalic, atraumatic  Neck: supple, normal ROM  Respiratory: Symmetric expansion, non-labored breathing  Cardiovascular: regular rate and rhythm  Abdomen: soft, non tender, non distended  Genitourinary: deferred  Skin: normal coloration and turgor, no rashes, no suspicious skin lesions noted  Neuro: alert and oriented x3, no gross deficits  Psych: normal judgment and insight, normal mood/affect, and non-anxious    Lab Review   Urinalysis demonstrates : no specimen  Lab Results   Component Value Date    WBC 4.52  01/30/2025    HGB 14.1 01/30/2025    HCT 42.8 01/30/2025    MCV 92 01/30/2025     01/30/2025     Lab Results   Component Value Date    CREATININE 0.7 01/30/2025    BUN 11 01/30/2025     Lab Results   Component Value Date    PSA <0.01 04/14/2025    PSA 5.9 (H) 12/13/2021     Imaging   None    Assessment:     1. Erectile dysfunction due to arterial insufficiency    2. Prostate cancer      Plan:   Diagnoses and all orders for this visit:    Erectile dysfunction due to arterial insufficiency  -     papaverine 30 mg/mL injection; Add phentolamine 10 mgs/ml., add PGE1  100 micrograms.    Prostate cancer  -     Prostate Specific Antigen, Diagnostic; Future    Plan  --Resume trimix  --PSA remains undetectable  --Patient would like to discuss IPP with Dr. Potter  --Follow up in 6 months with PSA

## 2025-04-22 NOTE — TELEPHONE ENCOUNTER
----- Message from Angela Finley NP sent at 4/22/2025  9:53 AM CDT -----  Please schedule a visit with Dr. Potter to discuss IPP.  Follow up with Dr. Miller in 6 months with PSA. Thanks!

## 2025-04-25 DIAGNOSIS — N52.01 ERECTILE DYSFUNCTION DUE TO ARTERIAL INSUFFICIENCY: ICD-10-CM

## 2025-04-25 RX ORDER — PAPAVERINE HYDROCHLORIDE 30 MG/ML
INJECTION INTRAMUSCULAR; INTRAVENOUS
Qty: 10 ML | Refills: 12 | Status: SHIPPED | OUTPATIENT
Start: 2025-04-25

## 2025-05-15 ENCOUNTER — TELEPHONE (OUTPATIENT)
Dept: ENDOSCOPY | Facility: HOSPITAL | Age: 70
End: 2025-05-15
Payer: MEDICARE

## 2025-05-15 ENCOUNTER — PATIENT MESSAGE (OUTPATIENT)
Dept: ENDOSCOPY | Facility: HOSPITAL | Age: 70
End: 2025-05-15
Payer: MEDICARE

## 2025-05-15 NOTE — TELEPHONE ENCOUNTER
Patient is scheduled for a Colonoscopy on 5/19/25 with Dr. yip  Referral for procedure from PAT [id number]

## 2025-05-19 ENCOUNTER — HOSPITAL ENCOUNTER (OUTPATIENT)
Facility: HOSPITAL | Age: 70
Discharge: HOME | End: 2025-05-19
Attending: INTERNAL MEDICINE | Admitting: INTERNAL MEDICINE
Payer: MEDICARE

## 2025-05-19 ENCOUNTER — ANESTHESIA EVENT (OUTPATIENT)
Dept: ENDOSCOPY | Facility: HOSPITAL | Age: 70
End: 2025-05-19
Payer: MEDICARE

## 2025-05-19 ENCOUNTER — ANESTHESIA (OUTPATIENT)
Dept: ENDOSCOPY | Facility: HOSPITAL | Age: 70
End: 2025-05-19
Payer: MEDICARE

## 2025-05-19 VITALS
DIASTOLIC BLOOD PRESSURE: 75 MMHG | RESPIRATION RATE: 16 BRPM | HEART RATE: 69 BPM | OXYGEN SATURATION: 99 % | HEIGHT: 71 IN | SYSTOLIC BLOOD PRESSURE: 127 MMHG | BODY MASS INDEX: 24.45 KG/M2 | WEIGHT: 174.63 LBS | TEMPERATURE: 98 F

## 2025-05-19 DIAGNOSIS — Z86.0100 PERSONAL HISTORY OF COLON POLYPS, UNSPECIFIED: Primary | ICD-10-CM

## 2025-05-19 PROCEDURE — 37000009 HC ANESTHESIA EA ADD 15 MINS: Mod: HCNC | Performed by: INTERNAL MEDICINE

## 2025-05-19 PROCEDURE — G0105 COLORECTAL SCRN; HI RISK IND: HCPCS | Mod: HCNC | Performed by: INTERNAL MEDICINE

## 2025-05-19 PROCEDURE — 37000008 HC ANESTHESIA 1ST 15 MINUTES: Mod: HCNC | Performed by: INTERNAL MEDICINE

## 2025-05-19 PROCEDURE — 25000003 PHARM REV CODE 250: Mod: HCNC

## 2025-05-19 PROCEDURE — 63600175 PHARM REV CODE 636 W HCPCS: Mod: HCNC

## 2025-05-19 PROCEDURE — G0105 COLORECTAL SCRN; HI RISK IND: HCPCS | Mod: HCNC,,, | Performed by: INTERNAL MEDICINE

## 2025-05-19 PROCEDURE — 27201012 HC FORCEPS, HOT/COLD, DISP: Mod: HCNC | Performed by: INTERNAL MEDICINE

## 2025-05-19 RX ORDER — PHENYLEPHRINE HYDROCHLORIDE 10 MG/ML
INJECTION INTRAVENOUS
Status: DISCONTINUED | OUTPATIENT
Start: 2025-05-19 | End: 2025-05-19

## 2025-05-19 RX ORDER — SODIUM CHLORIDE 9 MG/ML
INJECTION, SOLUTION INTRAVENOUS CONTINUOUS
Status: DISCONTINUED | OUTPATIENT
Start: 2025-05-19 | End: 2025-05-19 | Stop reason: HOSPADM

## 2025-05-19 RX ORDER — LIDOCAINE HYDROCHLORIDE 20 MG/ML
INJECTION INTRAVENOUS
Status: DISCONTINUED | OUTPATIENT
Start: 2025-05-19 | End: 2025-05-19

## 2025-05-19 RX ORDER — PROPOFOL 10 MG/ML
VIAL (ML) INTRAVENOUS
Status: DISCONTINUED | OUTPATIENT
Start: 2025-05-19 | End: 2025-05-19

## 2025-05-19 RX ADMIN — PHENYLEPHRINE HYDROCHLORIDE 100 MCG: 10 INJECTION INTRAVENOUS at 07:05

## 2025-05-19 RX ADMIN — LIDOCAINE HYDROCHLORIDE 100 MG: 20 INJECTION INTRAVENOUS at 07:05

## 2025-05-19 RX ADMIN — SODIUM CHLORIDE: 9 INJECTION, SOLUTION INTRAVENOUS at 07:05

## 2025-05-19 RX ADMIN — PROPOFOL 100 MG: 10 INJECTION, EMULSION INTRAVENOUS at 07:05

## 2025-05-19 RX ADMIN — PROPOFOL 150 MCG/KG/MIN: 10 INJECTION, EMULSION INTRAVENOUS at 07:05

## 2025-05-19 NOTE — TRANSFER OF CARE
"Anesthesia Transfer of Care Note    Patient: Herbie Naqvi    Procedure(s) Performed: Procedure(s) (LRB):  COLONOSCOPY, SCREENING, LOW RISK PATIENT (N/A)    Patient location: [facility]    Anesthesia Type: general    Transport from OR: Transported from OR on room air with adequate spontaneous ventilation    Post pain: adequate analgesia    Post assessment: no apparent anesthetic complications and tolerated procedure well    Post vital signs: stable    Level of consciousness: responds to stimulation    Nausea/Vomiting: no nausea/vomiting    Complications: none    Transfer of care protocol was followed      Last vitals: Visit Vitals  /69 (BP Location: Left arm, Patient Position: Lying)   Pulse 80   Temp 36.5 °C (97.7 °F)   Resp 16   Ht 5' 11" (1.803 m)   Wt 79.2 kg (174 lb 9.7 oz)   SpO2 96%   BMI 24.35 kg/m²     "

## 2025-05-19 NOTE — H&P
Short Stay Endoscopy History and Physical    PCP - Edwin Lawson MD  Referring Physician - Edwin Lawson MD  [address] [place] [address]    Procedure - Colonoscopy  ASA - per anesthesia  Mallampati - per anesthesia  History of Anesthesia problems - no  Family history Anesthesia problems -  no   Plan of anesthesia - General    HPI  69 y.o. male  Reason for procedure:   Encounter for screening for malignant neoplasm of colon [Z12.11]        ROS:  Constitutional: No fevers, chills, No weight loss  CV: No chest pain  Pulm: No cough, No shortness of breath  GI: see HPI    Medical History:  has a past medical history of Prostate cancer.    Surgical History:  has a past surgical history that includes wrist broke ; Colonoscopy (N/A, 11/30/2017); and Robot-assisted laparoscopic prostatectomy (N/A, 12/20/2023).    Family History: family history includes Cancer in his daughter; Diabetes in his mother; Heart disease in his father; Hyperlipidemia in his father..    Social History:  reports that he has quit smoking. His smoking use included cigarettes. He started smoking about 11 years ago. He has never used smokeless tobacco. He reports that he does not drink alcohol and does not use drugs.    Review of patient's allergies indicates:  No Known Allergies    Medications:   Prescriptions Prior to Admission[1]    Physical Exam:    Vital Signs:   Vitals:    05/19/25 0715   BP: 121/74   Pulse: 69   Resp: 18   Temp: 97.9 °F (36.6 °C)       General Appearance: Well appearing in no acute distress  Abdomen: Soft, non tender, non distended with normal bowel sounds, no masses    Labs:  Lab Results   Component Value Date    WBC 4.52 01/30/2025    HGB 14.1 01/30/2025    HCT 42.8 01/30/2025     01/30/2025    CHOL 173 01/30/2025    TRIG 68 01/30/2025    HDL 61 01/30/2025    ALT 31 01/30/2025    AST 27 01/30/2025     01/30/2025    K 3.9 01/30/2025     01/30/2025    CREATININE 0.7  01/30/2025    BUN 11 01/30/2025    CO2 25 01/30/2025    TSH 0.624 11/08/2022    PSA <0.01 04/14/2025    HGBA1C 5.9 (H) 01/30/2025       I have explained the risks and benefits of this endoscopic procedure to the patient including but not limited to bleeding, inflammation, infection, perforation, and death.      Brady Bhatt MD       [1]   Facility-Administered Medications Prior to Admission   Medication Dose Route Frequency Provider Last Rate Last Admin    gentamicin injection 80 mg  80 mg Intramuscular 1 time in Clinic/HOD Angela Finley NP        LIDOcaine (PF) 10 mg/ml (1%) injection 50 mg  5 mL Other 1 time in Clinic/HOD Sha Angel MD         Medications Prior to Admission   Medication Sig Dispense Refill Last Dose/Taking    latanoprost 0.005 % ophthalmic solution INSTILL 1 DROP INTO EACH EYE ONCE DAILY 12 mL 0 5/18/2025    meloxicam (MOBIC) 15 MG tablet Take 1 tablet (15 mg total) by mouth once daily. (Patient not taking: Reported on 1/30/2025) 90 tablet 3     multivitamin with minerals tablet Take 1 tablet by mouth once daily. (Patient not taking: Reported on 1/30/2025)   Unknown    omega-3 fatty acids/fish oil (FISH OIL-OMEGA-3 FATTY ACIDS) 300-1,000 mg capsule Take by mouth once daily.   Unknown    papaverine 30 mg/mL injection Inject 0.1-0.5 cc PRN for ED 10 mL 12     tadalafiL (CIALIS) 20 MG Tab Take 1 tablet (20 mg total) by mouth every 72 hours as needed (ED). (Patient not taking: Reported on 1/30/2025) 30 tablet 11

## 2025-05-19 NOTE — ANESTHESIA POSTPROCEDURE EVALUATION
Anesthesia Post Evaluation    Patient: Herbie Naqvi    Procedure(s) Performed: Procedure(s) (LRB):  COLONOSCOPY, SCREENING, LOW RISK PATIENT (N/A)    Final Anesthesia Type: general      Patient location during evaluation: PACU  Patient participation: Yes- Able to Participate  Level of consciousness: awake and alert  Post-procedure vital signs: reviewed and stable  Pain management: adequate  Airway patency: patent    PONV status at discharge: No PONV  Anesthetic complications: no      Cardiovascular status: blood pressure returned to baseline  Respiratory status: unassisted  Hydration status: euvolemic  Follow-up not needed.              Vitals Value Taken Time   /75 05/19/25 08:51   Temp 36.5 °C (97.7 °F) 05/19/25 08:21   Pulse 69 05/19/25 08:51   Resp 16 05/19/25 08:51   SpO2 99 % 05/19/25 08:51         Event Time   Out of Recovery 08:55:36         Pain/Maribell Score: Maribell Score: 9 (5/19/2025  8:23 AM)

## 2025-05-19 NOTE — PROVATION PATIENT INSTRUCTIONS
Discharge Summary/Instructions after an Endoscopic Procedure  Patient Name: Herbie Naqvi  Patient MRN: [id number]  Patient YOB: 1955  Monday, May 19, 2025  Brady Bhatt MD  Dear patient,  As a result of recent federal legislation (The Federal Cures Act), you may   receive lab or pathology results from your procedure in your [facility]   account before your physician is able to contact you. Your physician or   their representative will relay the results to you with their   recommendations at their soonest availability.  Thank you,  RESTRICTIONS:  During your procedure today, you received medications for sedation.  These   medications may affect your judgment, balance and coordination.  Therefore,   for 24 hours, you have the following restrictions:   - DO NOT drive a car, operate machinery, make legal/financial decisions,   sign important papers or drink alcohol.    ACTIVITY:  Today: no heavy lifting, straining or running due to procedural   sedation/anesthesia.  The following day: return to full activity including work.  DIET:  Eat and drink normally unless instructed otherwise.     TREATMENT FOR COMMON SIDE EFFECTS:  - Mild abdominal pain, nausea, belching, bloating or excessive gas:  rest,   eat lightly and use a heating pad.  - Sore Throat: treat with throat lozenges and/or gargle with warm salt   water.  - Because air was used during the procedure, expelling large amounts of air   from your rectum or belching is normal.  - If a bowel prep was taken, you may not have a bowel movement for 1-3 days.    This is normal.  SYMPTOMS TO WATCH FOR AND REPORT TO YOUR PHYSICIAN:  1. Abdominal pain or bloating, other than gas cramps.  2. Chest pain.  3. Back pain.  4. Signs of infection such as: chills or fever occurring within 24 hours   after the procedure.  5. Rectal bleeding, which would show as bright red, maroon, or black stools.   (A tablespoon of blood from the rectum is not serious, especially  if   hemorrhoids are present.)  6. Vomiting.  7. Weakness or dizziness.  GO DIRECTLY TO THE NEAREST EMERGENCY ROOM IF YOU HAVE ANY OF THE FOLLOWING:      Difficulty breathing              Chills and/or fever over 101 F   Persistent vomiting and/or vomiting blood   Severe abdominal pain   Severe chest pain   Black, tarry stools   Bleeding- more than one tablespoon   Any other symptom or condition that you feel may need urgent attention  Your doctor recommends these additional instructions:  If any biopsies were taken, your doctors clinic will contact you in 1 to 2   weeks with any results.  - Discharge patient to home (ambulatory).   - Patient has a contact number available for emergencies.  The signs and   symptoms of potential delayed complications were discussed with the   patient.  Return to normal activities tomorrow.  Written discharge   instructions were provided to the patient.   - Resume previous diet.   - Continue present medications.   - Return to primary care physician as previously scheduled.   - Repeat colonoscopy in 5 years for surveillance.  For questions, problems or results please call your physician - Brady Bhatt MD at Work:  [phone].  [facility] [place], EMERGENCY ROOM PHONE NUMBER: [phone]  IF A COMPLICATION OR EMERGENCY SITUATION ARISES AND YOU ARE UNABLE TO REACH   YOUR PHYSICIAN - GO DIRECTLY TO THE EMERGENCY ROOM.  MD Brady Cano MD  5/19/2025 8:19:49 AM  This report has been verified and signed electronically.  Dear patient,  As a result of recent federal legislation (The Federal Cures Act), you may   receive lab or pathology results from your procedure in your [facility]   account before your physician is able to contact you. Your physician or   their representative will relay the results to you with their   recommendations at their soonest availability.  Thank you,  PROVATION

## 2025-05-19 NOTE — ANESTHESIA PREPROCEDURE EVALUATION
05/19/2025  Herbie Naqvi is a 69 y.o., male.    Past Medical History:   Diagnosis Date    Prostate cancer      Past Surgical History:   Procedure Laterality Date    COLONOSCOPY N/A 11/30/2017    Procedure: COLONOSCOPY;  Surgeon: John Roman MD;  Location: [facility];  Service: Endoscopy;  Laterality: N/A;  colonoscopy confirmed    ROBOT-ASSISTED LAPAROSCOPIC PROSTATECTOMY N/A 12/20/2023    Procedure: ROBOTIC PROSTATECTOMY;  Surgeon: Armani Miller MD;  Location: [facility];  Service: Urology;  Laterality: N/A;    wrist broke       rt side wrist     Problem List[1]  Medications Ordered Prior to Encounter[2]      Pre-op Assessment    I have reviewed the Patient Summary Reports.     I have reviewed the Nursing Notes. I have reviewed the NPO Status.   I have reviewed the Medications.     Review of Systems  Anesthesia Hx:  No problems with previous Anesthesia   History of prior surgery of interest to airway management or planning: heart surgery.         Denies Family Hx of Anesthesia complications.    Denies Personal Hx of Anesthesia complications.                    Hematology/Oncology:  Hematology Normal   Oncology Normal                                   EENT/Dental:  EENT/Dental Normal           Cardiovascular:  Cardiovascular Normal                                              Pulmonary:  Pulmonary Normal                       Renal/:  Renal/ Normal                 Hepatic/GI:  Hepatic/GI Normal                    Musculoskeletal:  Musculoskeletal Normal                Neurological:  Neurology Normal                                      Endocrine:  Endocrine Normal            Dermatological:  Skin Normal    Psych:  Psychiatric Normal                    Physical Exam  General: Well nourished, Cooperative, Alert and Oriented    Airway:  Mallampati: III   Mouth Opening: Small, but > 3cm  TM Distance:  Normal  Tongue: Normal  Neck ROM: Normal ROM    Dental:  Dentures        Anesthesia Plan  Type of Anesthesia, risks & benefits discussed:    Anesthesia Type: Gen Natural Airway  Intra-op Monitoring Plan: Standard ASA Monitors  Post Op Pain Control Plan: multimodal analgesia and IV/PO Opioids PRN  Induction:  IV  Informed Consent: Informed consent signed with the Patient and all parties understand the risks and agree with anesthesia plan.  All questions answered. Patient consented to blood products? No  ASA Score: 1  Day of Surgery Review of History & Physical: H&P Update referred to the surgeon/provider.    Ready For Surgery From Anesthesia Perspective.     .           [1]   Patient Active Problem List  Diagnosis    Complex tear of medial meniscus of right knee as current injury    Primary osteoarthritis of right knee    Vitamin D insufficiency    Personal history of other endocrine, nutritional and metabolic disease    Prediabetes    Prostate cancer    Anemia    Nocturia    Primary open angle glaucoma (POAG) of both eyes, moderate stage   [2]   No current facility-administered medications on file prior to encounter.     Current Outpatient Medications on File Prior to Encounter   Medication Sig Dispense Refill    meloxicam (MOBIC) 15 MG tablet Take 1 tablet (15 mg total) by mouth once daily. (Patient not taking: Reported on 1/30/2025) 90 tablet 3    multivitamin with minerals tablet Take 1 tablet by mouth once daily. (Patient not taking: Reported on 1/30/2025)      omega-3 fatty acids/fish oil (FISH OIL-OMEGA-3 FATTY ACIDS) 300-1,000 mg capsule Take by mouth once daily.      tadalafiL (CIALIS) 20 MG Tab Take 1 tablet (20 mg total) by mouth every 72 hours as needed (ED). (Patient not taking: Reported on 1/30/2025) 30 tablet 11

## 2025-05-20 ENCOUNTER — OFFICE VISIT (OUTPATIENT)
Dept: OPTOMETRY | Facility: CLINIC | Age: 70
End: 2025-05-20
Payer: MEDICARE

## 2025-05-20 ENCOUNTER — RESULTS FOLLOW-UP (OUTPATIENT)
Dept: FAMILY MEDICINE | Facility: CLINIC | Age: 70
End: 2025-05-20
Payer: MEDICARE

## 2025-05-20 DIAGNOSIS — H52.4 MYOPIA OF BOTH EYES WITH ASTIGMATISM AND PRESBYOPIA: ICD-10-CM

## 2025-05-20 DIAGNOSIS — H52.13 MYOPIA OF BOTH EYES WITH ASTIGMATISM AND PRESBYOPIA: ICD-10-CM

## 2025-05-20 DIAGNOSIS — H25.13 NUCLEAR SCLEROSIS, BILATERAL: ICD-10-CM

## 2025-05-20 DIAGNOSIS — H40.1132 PRIMARY OPEN ANGLE GLAUCOMA (POAG) OF BOTH EYES, MODERATE STAGE: Primary | ICD-10-CM

## 2025-05-20 DIAGNOSIS — H52.203 MYOPIA OF BOTH EYES WITH ASTIGMATISM AND PRESBYOPIA: ICD-10-CM

## 2025-05-20 PROCEDURE — 1126F AMNT PAIN NOTED NONE PRSNT: CPT | Mod: CPTII,HCNC,S$GLB, | Performed by: OPTOMETRIST

## 2025-05-20 PROCEDURE — 99999 PR PBB SHADOW E&M-EST. PATIENT-LVL II: CPT | Mod: PBBFAC,HCNC,, | Performed by: OPTOMETRIST

## 2025-05-20 PROCEDURE — 1159F MED LIST DOCD IN RCRD: CPT | Mod: CPTII,HCNC,S$GLB, | Performed by: OPTOMETRIST

## 2025-05-20 PROCEDURE — 3044F HG A1C LEVEL LT 7.0%: CPT | Mod: CPTII,HCNC,S$GLB, | Performed by: OPTOMETRIST

## 2025-05-20 PROCEDURE — 1101F PT FALLS ASSESS-DOCD LE1/YR: CPT | Mod: CPTII,HCNC,S$GLB, | Performed by: OPTOMETRIST

## 2025-05-20 PROCEDURE — 99214 OFFICE O/P EST MOD 30 MIN: CPT | Mod: HCNC,S$GLB,, | Performed by: OPTOMETRIST

## 2025-05-20 PROCEDURE — 3288F FALL RISK ASSESSMENT DOCD: CPT | Mod: CPTII,HCNC,S$GLB, | Performed by: OPTOMETRIST

## 2025-05-20 NOTE — PROGRESS NOTES
HPI    Annual Exam, lost to follow up [facility]   Pt states vision could be better but doesn't have sRx lost in the water    Pt denies F/F     Pt denies Dry/ Itchy/ Burning  Gtt: No    POAG Moderate Stage  Pt denies vision loss/ HA's   Latanoprost QHS, last yesterday PM  Pt reports good compliance   Last edited by Fortunato Curry, OD on 5/20/2025 12:43 PM.            Assessment /Plan     For exam results, see Encounter Report.    Primary open angle glaucoma (POAG) of both eyes, moderate stage  -Latanoprost QHS  -Target low teens  -HVF, OCT IOP 1-2 mos    Nuclear sclerosis, bilateral  -Educated patient on presence of cataracts at today's exam, monitor at annual dilated fundus exam. 5+ years surgical estimate.    Myopia of both eyes with astigmatism and presbyopia  Eyeglass Final Rx       Eyeglass Final Rx         Sphere Cylinder Axis Dist VA Add    Right -2.00 +3.00 125 20/30 +2.50    Left -2.00 +3.00 070 20/25 +2.50      Type: PAL    Expiration Date: 5/20/2026                      RTC 1 yr

## 2025-06-03 ENCOUNTER — TELEPHONE (OUTPATIENT)
Dept: UROLOGY | Facility: CLINIC | Age: 70
End: 2025-06-03
Payer: MEDICARE

## 2025-06-05 ENCOUNTER — OFFICE VISIT (OUTPATIENT)
Dept: UROLOGY | Facility: CLINIC | Age: 70
End: 2025-06-05
Payer: MEDICARE

## 2025-06-05 ENCOUNTER — TELEPHONE (OUTPATIENT)
Dept: UROLOGY | Facility: CLINIC | Age: 70
End: 2025-06-05

## 2025-06-05 VITALS
SYSTOLIC BLOOD PRESSURE: 136 MMHG | BODY MASS INDEX: 24.39 KG/M2 | RESPIRATION RATE: 12 BRPM | OXYGEN SATURATION: 100 % | DIASTOLIC BLOOD PRESSURE: 77 MMHG | WEIGHT: 174.19 LBS | HEIGHT: 71 IN | HEART RATE: 56 BPM

## 2025-06-05 DIAGNOSIS — N52.31 ERECTILE DYSFUNCTION AFTER RADICAL PROSTATECTOMY: Primary | ICD-10-CM

## 2025-06-05 NOTE — H&P (VIEW-ONLY)
"[facility] - Urology   Clinic Note    SUBJECTIVE:     Chief Complaint: ED    History of Present Illness:  Herbie Naqvi is a 69 y.o. male who presents to clinic for ED. He is established to our clinic but new to me.     Tried and failed PDE5i's. On Trimix. Currently unable to achieve an erection  Interested in IPP.   He underwent RALP with BPLND with Dr. Miller in 2023. 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    Denies any prior history of MI, CVA, or CAD.     Anticoagulation:  Take fishoil, does not take any other blood thinning medications.     OBJECTIVE:     Estimated body mass index is 24.29 kg/m² as calculated from the following:    Height as of this encounter: 5' 11" (1.803 m).    Weight as of this encounter: 79 kg (174 lb 2.6 oz).    Vital Signs (Most Recent)  Pulse: (!) 56 (06/05/25 0850)  Resp: 12 (06/05/25 0850)  BP: 136/77 (06/05/25 0850)  SpO2: 100 % (06/05/25 0850)    Physical Exam  Constitutional:       General: He is not in acute distress.     Appearance: Normal appearance.   HENT:      Head: Normocephalic and atraumatic.   Eyes:      Extraocular Movements: Extraocular movements intact.      Pupils: Pupils are equal, round, and reactive to light.   Cardiovascular:      Rate and Rhythm: Normal rate.   Pulmonary:      Effort: Pulmonary effort is normal. No respiratory distress.   Abdominal:      General: Abdomen is flat. There is no distension.      Tenderness: There is no abdominal tenderness. There is no right CVA tenderness or left CVA tenderness.   Genitourinary:     Comments: The penis is circumcised with no evidence of plaques or induration. The urethral meatus is normal. The testes, epididymides, and cord structures are normal in size and contour bilaterally. The scrotum is normal in size and contour. No rashes, erythema, or induration noted.     Skin:     Coloration: Skin is not jaundiced.   Neurological:      General: No focal deficit present. "      Mental Status: He is alert and oriented to person, place, and time.   Psychiatric:         Mood and Affect: Mood normal.         Behavior: Behavior normal.     Lab Results   Component Value Date    BUN 11 01/30/2025    CREATININE 0.7 01/30/2025    WBC 4.52 01/30/2025    HGB 14.1 01/30/2025    HCT 42.8 01/30/2025     01/30/2025    AST 27 01/30/2025    ALT 31 01/30/2025    ALKPHOS 71 01/30/2025    ALBUMIN 4.2 01/30/2025    HGBA1C 5.9 (H) 01/30/2025        Lab Results   Component Value Date    PSA <0.01 04/14/2025    PSA 5.9 (H) 12/13/2021    PSA 2.4 10/05/2020    PSA 1.7 03/19/2019    PSA 1.4 03/22/2018    PSA 0.85 03/26/2015    PSADIAG <0.01 01/30/2025    PSADIAG <0.01 10/17/2024    PSADIAG <0.01 06/10/2024    PSADIAG 14.9 (H) 03/21/2023    PSADIAG 9.0 (H) 09/22/2022    PSAFREE 0.74 12/30/2021    PSAFREEPCT 10.72 12/30/2021     UA - nitrite, blood, LE neg    ASSESSMENT     1. Erectile dysfunction after radical prostatectomy      PLAN:   1. Erectile dysfunction after radical prostatectomy    After discussion with the patient would like to move forward with IPP placement given ED refractory to oral medications and ICI. Risks and benefits discussed.   Check AM NOREEN Lauren, DO

## 2025-06-20 ENCOUNTER — ANESTHESIA EVENT (OUTPATIENT)
Dept: SURGERY | Facility: OTHER | Age: 70
End: 2025-06-20
Payer: MEDICARE

## 2025-06-23 ENCOUNTER — HOSPITAL ENCOUNTER (OUTPATIENT)
Dept: PREADMISSION TESTING | Facility: OTHER | Age: 70
Discharge: HOME | End: 2025-06-23
Attending: STUDENT IN AN ORGANIZED HEALTH CARE EDUCATION/TRAINING PROGRAM
Payer: MEDICARE

## 2025-06-23 VITALS
WEIGHT: 174 LBS | DIASTOLIC BLOOD PRESSURE: 83 MMHG | TEMPERATURE: 99 F | SYSTOLIC BLOOD PRESSURE: 135 MMHG | HEIGHT: 71 IN | RESPIRATION RATE: 16 BRPM | OXYGEN SATURATION: 97 % | BODY MASS INDEX: 24.36 KG/M2 | HEART RATE: 74 BPM

## 2025-06-23 RX ORDER — SODIUM CHLORIDE, SODIUM LACTATE, POTASSIUM CHLORIDE, CALCIUM CHLORIDE 600; 310; 30; 20 MG/100ML; MG/100ML; MG/100ML; MG/100ML
INJECTION, SOLUTION INTRAVENOUS CONTINUOUS
Status: CANCELLED | OUTPATIENT
Start: 2025-06-23

## 2025-06-23 RX ORDER — LIDOCAINE HYDROCHLORIDE 10 MG/ML
0.5 INJECTION, SOLUTION EPIDURAL; INFILTRATION; INTRACAUDAL; PERINEURAL ONCE
Status: CANCELLED | OUTPATIENT
Start: 2025-06-23 | End: 2025-06-23

## 2025-06-23 RX ORDER — ACETAMINOPHEN 500 MG
1000 TABLET ORAL
Status: CANCELLED | OUTPATIENT
Start: 2025-06-23 | End: 2025-06-23

## 2025-06-23 NOTE — DISCHARGE INSTRUCTIONS
Information to Prepare you for your Surgery    PRE-ADMIT TESTING -  [phone]   -Mis  [address] [place] [facility] [address]  [facility]      Your surgery has been scheduled at [facility]. We are pleased to have the opportunity to serve you. For Further Information please call [phone].    On the day of surgery please report to the Information Desk on the 1st floor.    CONTACT YOUR PHYSICIAN'S OFFICE THE DAY PRIOR TO YOUR SURGERY TO OBTAIN YOUR ARRIVAL TIME.     The evening before surgery do not eat anything after 9 p.m. ( this includes hard candy, chewing gum and mints).  You may only have GATORADE, POWERADE AND WATER  from 9 p.m. until you leave your home.    DO NOT DRINK ANY LIQUIDS ON THE WAY TO THE HOSPITAL.      Why does your anesthesiologist allow you to drink Gatorade/Powerade before surgery?  Gatorade/Powerade helps to increase your comfort before surgery and to decrease your nausea after surgery. The carbohydrates in Gatorade/Powerade help reduce your body's stress response to surgery.      Outpatient Surgery- May allow 2 adult (18 and older) Support Persons (1 being the designated ) for all surgical/procedural patients. A breastfeeding mother will be allowed her infant and 2 adult Support Persons. No one under the age of 18 will be allowed in the building. No swapping out of visitors in the [facility].      SPECIAL MEDICATION INSTRUCTIONS: TAKE medications checked off on your Medication List.        Surgery Patients:    If you take ASPIRIN - Your PHYSICIAN/SURGEON will need to inform you IF/OR when you need to stop taking aspirin prior to your surgery.     The week prior to surgery do not ot take any medications containing IBUPROFEN or NSAIDS ( Advil, Motrin, Goodys, BC, Aleve, Naproxen,  Ketorolac, Meloxicam, Mobic, Toradol,etc) If you are not sure if you should take a medicine please call your  surgeon's office.  Ok to take Tylenol    Do Not Wear any make-up (especially eye make-up) to surgery. Please remove any false eyelashes or eyelash extensions. If you arrive the day of surgery with makeup/eyelashes on you will be required to remove prior to surgery. (There is a risk of corneal abrasions if eye makeup/eyelash extensions are not removed)      Leave all valuables at home.   Do Not wear any jewelry or watches, including any metal in body piercings. Jewelry must be removed prior to coming to the hospital.  There is a possibility that rings that are unable to be removed may be cut off if they are on the surgical extremity.    Please remove all hair extensions, wigs, clips and any other metal accessories/ ornaments from your hair.  These items may pose a flammable/fire risk in Surgery and must be removed.    Do not shave your surgical area at least 5 days prior to your surgery. The surgical prep will be performed at the hospital according to Infection Control regulations.    Contact Lens must be removed before surgery. Either do not wear the contact lens or bring a case and solution for storage.  Please bring a container for eyeglasses or dentures as required.  Bring any paperwork your physician has provided, such as consent forms,  history and physicals, doctor's orders, etc.   Bring comfortable clothes that are loose fitting to wear upon discharge. Take into consideration the type of surgery being performed.  Maintain your diet as advised per your physician the day prior to surgery.      Adequate rest the night before surgery is advised.   Park in the Parking lot behind the hospital or in the [facility] across the street from the parking lot. Parking is complimentary.  If you will be discharged the same day as your procedure, please arrange for a responsible adult to drive you home or to accompany you if traveling by taxi.   YOU WILL NOT BE PERMITTED TO DRIVE OR TO LEAVE THE HOSPITAL ALONE  AFTER SURGERY.   If you are being discharged the same day, it is strongly recommended that you arrange for someone to remain with you for the first 24 hrs following your surgery.    The Surgeon will speak to your family/visitor after your surgery regarding the outcome of your surgery and post op care.  The Surgeon may speak to you after your surgery, but there is a possibility you may not remember the details.  Please check with your family members regarding the conversation with the Surgeon.    We strongly recommend whoever is bringing you home be present for discharge instructions.  This will ensure a thorough understanding for your post op home care.    If the patient has fever, cough, or signs/symptoms of Flu or Covid please do not come in for your surgery. Contact your surgeon and your primary care physician for further instructions.           Thank you for your cooperation.  The Staff of [facility].            Bathing Instructions with Hibiclens or Dial Soap    Shower the evening before and morning of your procedure with Chlorhexidine (Hibiclens)  do not use Chlorhexidine on your face or genitals. Do not get in your eyes.  Wash your face with water and your regular face wash/soap  Use your regular shampoo  Apply Chlorhexidine (Hibiclens) directly on your skin or on a wet washcloth and wash gently. When showering: Move away from the shower stream when applying Chlorhexidine (Hibiclens) to avoid rinsing off too soon.  Rinse thoroughly with warm water  Do not dilute Chlorhexidine (Hibiclens)   Dry off as usual, do not use any deodorant, powder, body lotions, perfume, after shave or cologne.   Place clean sheets on bed day before surgery.    Thanks Mis

## 2025-06-23 NOTE — ANESTHESIA PREPROCEDURE EVALUATION
06/22/2025  Herbie Naqvi is a 69 y.o., male.      Pre-op Assessment    I have reviewed the Patient Summary Reports.     I have reviewed the Nursing Notes. I have reviewed the NPO Status.   I have reviewed the Medications.     Review of Systems  Anesthesia Hx:             Denies Family Hx of Anesthesia complications.    Denies Personal Hx of Anesthesia complications.                    Social:  Former Smoker       Hematology/Oncology:       -- Anemia:                    --  Cancer in past history (prostate cancer):              surgery       EENT/Dental:  EENT/Dental Normal  Eyes:             Eye Disease:    Glaucoma:                 Cardiovascular:  Exercise tolerance: good              no hyperlipidemia    EKG 2023: Sinus bradycardia   Otherwise normal ECG (HR 50)    Active/ works in construction                           Pulmonary:       Denies Shortness of breath.                  Renal/:    BPH              Hepatic/GI:  Hepatic/GI Normal                    Musculoskeletal:  Arthritis               Neurological:  Neurology Normal                                      Endocrine:  Endocrine Normal    prediabetes        Dermatological:  Skin Normal    Psych:  Psychiatric Normal                    Physical Exam  General: Well nourished, Cooperative, Alert and Oriented    Airway:  Mallampati: III   Mouth Opening: Normal  TM Distance: Normal  Tongue: Normal  Neck ROM: Normal ROM    Dental:  Dentures  Bottom and top dentures      Anesthesia Plan  Type of Anesthesia, risks & benefits discussed:    Anesthesia Type: Gen ETT  Intra-op Monitoring Plan: Standard ASA Monitors  Post Op Pain Control Plan: multimodal analgesia  Induction:  IV  Airway Plan: Video, Post-Induction  ASA Score: 2  Day of Surgery Review of History & Physical: H&P Update referred to the surgeon/provider.  Anesthesia Plan Notes: CBC, CMP 01/30/25  in EPIC  Had robotic prostatectomy 12/2023 at [facility]    Ready For Surgery From Anesthesia Perspective.     .

## 2025-06-26 ENCOUNTER — TELEPHONE (OUTPATIENT)
Dept: UROLOGY | Facility: CLINIC | Age: 70
End: 2025-06-26
Payer: MEDICARE

## 2025-06-27 ENCOUNTER — HOSPITAL ENCOUNTER (OUTPATIENT)
Facility: OTHER | Age: 70
Discharge: HOME | End: 2025-06-27
Attending: STUDENT IN AN ORGANIZED HEALTH CARE EDUCATION/TRAINING PROGRAM | Admitting: STUDENT IN AN ORGANIZED HEALTH CARE EDUCATION/TRAINING PROGRAM
Payer: MEDICARE

## 2025-06-27 ENCOUNTER — NURSE TRIAGE (OUTPATIENT)
Dept: ADMINISTRATIVE | Facility: CLINIC | Age: 70
End: 2025-06-27
Payer: MEDICARE

## 2025-06-27 ENCOUNTER — HOSPITAL ENCOUNTER (OUTPATIENT)
Facility: OTHER | Age: 70
Discharge: HOME | End: 2025-06-28
Attending: EMERGENCY MEDICINE | Admitting: EMERGENCY MEDICINE
Payer: MEDICARE

## 2025-06-27 ENCOUNTER — ANESTHESIA (OUTPATIENT)
Dept: SURGERY | Facility: OTHER | Age: 70
End: 2025-06-27
Payer: MEDICARE

## 2025-06-27 VITALS
OXYGEN SATURATION: 98 % | RESPIRATION RATE: 16 BRPM | WEIGHT: 174 LBS | BODY MASS INDEX: 24.36 KG/M2 | HEIGHT: 71 IN | HEART RATE: 74 BPM | DIASTOLIC BLOOD PRESSURE: 86 MMHG | SYSTOLIC BLOOD PRESSURE: 128 MMHG | TEMPERATURE: 98 F

## 2025-06-27 DIAGNOSIS — T85.838A BLEEDING FROM JACKSON-PRATT DRAIN, INITIAL ENCOUNTER: Primary | ICD-10-CM

## 2025-06-27 DIAGNOSIS — G89.18 POST-OP PAIN: ICD-10-CM

## 2025-06-27 DIAGNOSIS — R42 LIGHTHEADEDNESS: ICD-10-CM

## 2025-06-27 DIAGNOSIS — N52.9 ERECTILE DYSFUNCTION: Primary | ICD-10-CM

## 2025-06-27 PROCEDURE — 63600175 PHARM REV CODE 636 W HCPCS: Mod: HCNC

## 2025-06-27 PROCEDURE — 63600175 PHARM REV CODE 636 W HCPCS: Mod: HCNC | Performed by: STUDENT IN AN ORGANIZED HEALTH CARE EDUCATION/TRAINING PROGRAM

## 2025-06-27 PROCEDURE — C1889 IMPLANT/INSERT DEVICE, NOC: HCPCS | Performed by: STUDENT IN AN ORGANIZED HEALTH CARE EDUCATION/TRAINING PROGRAM

## 2025-06-27 PROCEDURE — 54405 INSERT MULTI-COMP PENIS PROS: CPT | Mod: ,,, | Performed by: STUDENT IN AN ORGANIZED HEALTH CARE EDUCATION/TRAINING PROGRAM

## 2025-06-27 PROCEDURE — 25000003 PHARM REV CODE 250: Mod: HCNC | Performed by: ANESTHESIOLOGY

## 2025-06-27 PROCEDURE — 37000008 HC ANESTHESIA 1ST 15 MINUTES: Performed by: STUDENT IN AN ORGANIZED HEALTH CARE EDUCATION/TRAINING PROGRAM

## 2025-06-27 PROCEDURE — 71000016 HC POSTOP RECOV ADDL HR: Performed by: STUDENT IN AN ORGANIZED HEALTH CARE EDUCATION/TRAINING PROGRAM

## 2025-06-27 PROCEDURE — 63600175 PHARM REV CODE 636 W HCPCS: Mod: HCNC | Performed by: NURSE ANESTHETIST, CERTIFIED REGISTERED

## 2025-06-27 PROCEDURE — 71000033 HC RECOVERY, INTIAL HOUR: Performed by: STUDENT IN AN ORGANIZED HEALTH CARE EDUCATION/TRAINING PROGRAM

## 2025-06-27 PROCEDURE — 36000706: Performed by: STUDENT IN AN ORGANIZED HEALTH CARE EDUCATION/TRAINING PROGRAM

## 2025-06-27 PROCEDURE — C1729 CATH, DRAINAGE: HCPCS | Performed by: STUDENT IN AN ORGANIZED HEALTH CARE EDUCATION/TRAINING PROGRAM

## 2025-06-27 PROCEDURE — 25000003 PHARM REV CODE 250: Mod: HCNC | Performed by: NURSE ANESTHETIST, CERTIFIED REGISTERED

## 2025-06-27 PROCEDURE — 37000009 HC ANESTHESIA EA ADD 15 MINS: Performed by: STUDENT IN AN ORGANIZED HEALTH CARE EDUCATION/TRAINING PROGRAM

## 2025-06-27 PROCEDURE — 25000003 PHARM REV CODE 250: Mod: HCNC

## 2025-06-27 PROCEDURE — 27201423 OPTIME MED/SURG SUP & DEVICES STERILE SUPPLY: Performed by: STUDENT IN AN ORGANIZED HEALTH CARE EDUCATION/TRAINING PROGRAM

## 2025-06-27 PROCEDURE — C1813 PROSTHESIS, PENILE, INFLATAB: HCPCS | Performed by: STUDENT IN AN ORGANIZED HEALTH CARE EDUCATION/TRAINING PROGRAM

## 2025-06-27 PROCEDURE — 36000707: Performed by: STUDENT IN AN ORGANIZED HEALTH CARE EDUCATION/TRAINING PROGRAM

## 2025-06-27 PROCEDURE — 71000039 HC RECOVERY, EACH ADD'L HOUR: Performed by: STUDENT IN AN ORGANIZED HEALTH CARE EDUCATION/TRAINING PROGRAM

## 2025-06-27 PROCEDURE — 71000015 HC POSTOP RECOV 1ST HR: Performed by: STUDENT IN AN ORGANIZED HEALTH CARE EDUCATION/TRAINING PROGRAM

## 2025-06-27 PROCEDURE — 63600175 PHARM REV CODE 636 W HCPCS: Mod: HCNC | Performed by: ANESTHESIOLOGY

## 2025-06-27 DEVICE — IMPLANT PENILE TITAN INFL TUBE: Type: IMPLANTABLE DEVICE | Site: PENIS | Status: FUNCTIONAL

## 2025-06-27 DEVICE — CYLINDER PUMP TITAN TOUCH 20MM: Type: IMPLANTABLE DEVICE | Site: PENIS | Status: FUNCTIONAL

## 2025-06-27 DEVICE — RESERVOIR CLOVERLEAF 75CC: Type: IMPLANTABLE DEVICE | Site: PENIS | Status: FUNCTIONAL

## 2025-06-27 RX ORDER — HYDROMORPHONE HYDROCHLORIDE 2 MG/ML
INJECTION, SOLUTION INTRAMUSCULAR; INTRAVENOUS; SUBCUTANEOUS
Status: DISCONTINUED | OUTPATIENT
Start: 2025-06-27 | End: 2025-06-27

## 2025-06-27 RX ORDER — DEXAMETHASONE SODIUM PHOSPHATE 4 MG/ML
INJECTION, SOLUTION INTRA-ARTICULAR; INTRALESIONAL; INTRAMUSCULAR; INTRAVENOUS; SOFT TISSUE
Status: DISCONTINUED | OUTPATIENT
Start: 2025-06-27 | End: 2025-06-27

## 2025-06-27 RX ORDER — LIDOCAINE HYDROCHLORIDE 10 MG/ML
0.5 INJECTION, SOLUTION EPIDURAL; INFILTRATION; INTRACAUDAL; PERINEURAL ONCE
Status: DISCONTINUED | OUTPATIENT
Start: 2025-06-27 | End: 2025-06-27 | Stop reason: HOSPADM

## 2025-06-27 RX ORDER — OXYCODONE HYDROCHLORIDE 5 MG/1
5 TABLET ORAL EVERY 4 HOURS PRN
Qty: 10 TABLET | Refills: 0 | Status: SHIPPED | OUTPATIENT
Start: 2025-06-27

## 2025-06-27 RX ORDER — ONDANSETRON HYDROCHLORIDE 2 MG/ML
INJECTION, SOLUTION INTRAMUSCULAR; INTRAVENOUS
Status: DISCONTINUED | OUTPATIENT
Start: 2025-06-27 | End: 2025-06-27

## 2025-06-27 RX ORDER — GENTAMICIN 40 MG/ML
INJECTION, SOLUTION INTRAMUSCULAR; INTRAVENOUS
Status: DISCONTINUED | OUTPATIENT
Start: 2025-06-27 | End: 2025-06-27 | Stop reason: HOSPADM

## 2025-06-27 RX ORDER — FENTANYL CITRATE 50 UG/ML
INJECTION, SOLUTION INTRAMUSCULAR; INTRAVENOUS
Status: DISCONTINUED | OUTPATIENT
Start: 2025-06-27 | End: 2025-06-27

## 2025-06-27 RX ORDER — ACETAMINOPHEN 500 MG
1000 TABLET ORAL
Status: COMPLETED | OUTPATIENT
Start: 2025-06-27 | End: 2025-06-27

## 2025-06-27 RX ORDER — VANCOMYCIN HYDROCHLORIDE 1 G/20ML
INJECTION, POWDER, LYOPHILIZED, FOR SOLUTION INTRAVENOUS
Status: DISCONTINUED | OUTPATIENT
Start: 2025-06-27 | End: 2025-06-27 | Stop reason: HOSPADM

## 2025-06-27 RX ORDER — HYDROMORPHONE HYDROCHLORIDE 2 MG/ML
0.4 INJECTION, SOLUTION INTRAMUSCULAR; INTRAVENOUS; SUBCUTANEOUS EVERY 5 MIN PRN
Status: DISCONTINUED | OUTPATIENT
Start: 2025-06-27 | End: 2025-06-27 | Stop reason: HOSPADM

## 2025-06-27 RX ORDER — PROPOFOL 10 MG/ML
VIAL (ML) INTRAVENOUS
Status: DISCONTINUED | OUTPATIENT
Start: 2025-06-27 | End: 2025-06-27

## 2025-06-27 RX ORDER — PROPOFOL 10 MG/ML
VIAL (ML) INTRAVENOUS CONTINUOUS PRN
Status: DISCONTINUED | OUTPATIENT
Start: 2025-06-27 | End: 2025-06-27

## 2025-06-27 RX ORDER — SULFAMETHOXAZOLE AND TRIMETHOPRIM 800; 160 MG/1; MG/1
1 TABLET ORAL 2 TIMES DAILY
Qty: 10 TABLET | Refills: 0 | Status: SHIPPED | OUTPATIENT
Start: 2025-06-27 | End: 2025-06-30

## 2025-06-27 RX ORDER — OXYCODONE HYDROCHLORIDE 5 MG/1
5 TABLET ORAL
Status: DISCONTINUED | OUTPATIENT
Start: 2025-06-27 | End: 2025-06-27 | Stop reason: HOSPADM

## 2025-06-27 RX ORDER — SODIUM CHLORIDE, SODIUM LACTATE, POTASSIUM CHLORIDE, CALCIUM CHLORIDE 600; 310; 30; 20 MG/100ML; MG/100ML; MG/100ML; MG/100ML
INJECTION, SOLUTION INTRAVENOUS CONTINUOUS
Status: DISCONTINUED | OUTPATIENT
Start: 2025-06-27 | End: 2025-06-27 | Stop reason: HOSPADM

## 2025-06-27 RX ORDER — GLUCAGON 1 MG
1 KIT INJECTION
Status: DISCONTINUED | OUTPATIENT
Start: 2025-06-27 | End: 2025-06-27 | Stop reason: HOSPADM

## 2025-06-27 RX ORDER — EPHEDRINE SULFATE 50 MG/ML
INJECTION, SOLUTION INTRAVENOUS
Status: DISCONTINUED | OUTPATIENT
Start: 2025-06-27 | End: 2025-06-27

## 2025-06-27 RX ORDER — SODIUM CHLORIDE 0.9 % (FLUSH) 0.9 %
3 SYRINGE (ML) INJECTION
Status: DISCONTINUED | OUTPATIENT
Start: 2025-06-27 | End: 2025-06-27 | Stop reason: HOSPADM

## 2025-06-27 RX ORDER — LIDOCAINE HYDROCHLORIDE 20 MG/ML
INJECTION INTRAVENOUS
Status: DISCONTINUED | OUTPATIENT
Start: 2025-06-27 | End: 2025-06-27

## 2025-06-27 RX ORDER — PROCHLORPERAZINE EDISYLATE 5 MG/ML
5 INJECTION INTRAMUSCULAR; INTRAVENOUS EVERY 30 MIN PRN
Status: DISCONTINUED | OUTPATIENT
Start: 2025-06-27 | End: 2025-06-27 | Stop reason: HOSPADM

## 2025-06-27 RX ADMIN — PROPOFOL 50 MG: 10 INJECTION, EMULSION INTRAVENOUS at 11:06

## 2025-06-27 RX ADMIN — FENTANYL CITRATE 50 MCG: 50 INJECTION, SOLUTION INTRAMUSCULAR; INTRAVENOUS at 11:06

## 2025-06-27 RX ADMIN — DEXAMETHASONE SODIUM PHOSPHATE 4 MG: 4 INJECTION, SOLUTION INTRAMUSCULAR; INTRAVENOUS at 10:06

## 2025-06-27 RX ADMIN — FENTANYL CITRATE 100 MCG: 50 INJECTION, SOLUTION INTRAMUSCULAR; INTRAVENOUS at 10:06

## 2025-06-27 RX ADMIN — HYDROMORPHONE HYDROCHLORIDE 0.4 MG: 2 INJECTION INTRAMUSCULAR; INTRAVENOUS; SUBCUTANEOUS at 01:06

## 2025-06-27 RX ADMIN — VANCOMYCIN HYDROCHLORIDE 1250 MG: 1.25 INJECTION, POWDER, LYOPHILIZED, FOR SOLUTION INTRAVENOUS at 08:06

## 2025-06-27 RX ADMIN — PROPOFOL 200 MG: 10 INJECTION, EMULSION INTRAVENOUS at 10:06

## 2025-06-27 RX ADMIN — PROPOFOL 50 MCG/KG/MIN: 10 INJECTION, EMULSION INTRAVENOUS at 10:06

## 2025-06-27 RX ADMIN — HYDROMORPHONE HYDROCHLORIDE 1 MG: 2 INJECTION INTRAMUSCULAR; INTRAVENOUS; SUBCUTANEOUS at 12:06

## 2025-06-27 RX ADMIN — EPHEDRINE SULFATE 10 MG: 50 INJECTION INTRAVENOUS at 10:06

## 2025-06-27 RX ADMIN — OXYCODONE HYDROCHLORIDE 5 MG: 5 TABLET ORAL at 12:06

## 2025-06-27 RX ADMIN — GENTAMICIN SULFATE 240 MG: 40 INJECTION, SOLUTION INTRAMUSCULAR; INTRAVENOUS at 08:06

## 2025-06-27 RX ADMIN — ACETAMINOPHEN 1000 MG: 500 TABLET, FILM COATED ORAL at 08:06

## 2025-06-27 RX ADMIN — LIDOCAINE HYDROCHLORIDE 50 MG: 20 INJECTION, SOLUTION INTRAVENOUS at 10:06

## 2025-06-27 RX ADMIN — CARBOXYMETHYLCELLULOSE SODIUM 2 DROP: 2.5 SOLUTION/ DROPS OPHTHALMIC at 10:06

## 2025-06-27 RX ADMIN — SODIUM CHLORIDE, SODIUM LACTATE, POTASSIUM CHLORIDE, AND CALCIUM CHLORIDE: .6; .31; .03; .02 INJECTION, SOLUTION INTRAVENOUS at 10:06

## 2025-06-27 RX ADMIN — GLYCOPYRROLATE 0.2 MG: 0.2 INJECTION, SOLUTION INTRAMUSCULAR; INTRAVITREAL at 10:06

## 2025-06-27 RX ADMIN — ONDANSETRON 4 MG: 2 INJECTION INTRAMUSCULAR; INTRAVENOUS at 10:06

## 2025-06-27 NOTE — NURSING
1700 Requested MD or resident to come to bedside for continuous bleeding at LEXX drain site. 4x4s changed 4xs since 2:30pm. Patient unable to urinate post op. Bladder scan did not show urine retention; possibly due to placement of drain/position and erect penis in post op dressing/position. ROMEL Lauren MD at bedside to assess drain site and place alejo catheter. Urine return of 400ccs. ROMEL Lauren MD gave final approval to send patient home and to complete in person appointment Monday morning. Teaching given to wife and patient on how to dress drain site over the weekend; all supplies given.

## 2025-06-27 NOTE — ANESTHESIA PROCEDURE NOTES
Intubation    Date/Time: 6/27/2025 10:27 AM    Performed by: Albania Clifford CRNA  Authorized by: Naveen Millan MD    Intubation:     Induction:  Intravenous    Intubated:  Postinduction    Mask Ventilation:  Easy mask    Attempts:  1    Attempted By:  CRNA    Difficult Airway Encountered?: No      Complications:  None    Airway Device:  Supraglottic airway/LMA    Airway Device Size:  5.0    Style/Cuff Inflation:  Uncuffed    Secured at:  The lips    Placement Verified By:  Capnometry    Complicating Factors:  None    Findings Post-Intubation:  Atraumatic/condition of teeth unchanged

## 2025-06-27 NOTE — BRIEF OP NOTE
[facility] ([place])  Brief Operative Note    Surgery Date: 6/27/2025     Surgeons and Role:     * Billy Potter MD - Primary    Assisting Surgeon: None    Pre-op Diagnosis:  Erectile dysfunction after radical prostatectomy [N52.31]    Post-op Diagnosis:  Post-Op Diagnosis Codes:     * Erectile dysfunction after radical prostatectomy [N52.31]    Procedure(s) (LRB):  INSERTION, INFLATABLE PENILE PROSTHESIS (N/A)    Anesthesia: General    Operative Findings: Penile prosthesis placed. Right groin LEXX left for drainage. No immediate complications.    Estimated Blood Loss: * No values recorded between 6/27/2025 10:18 AM and 6/27/2025 12:32 PM *         Specimens:   Specimen (24h ago, onward)      None            * No specimens in log *        Discharge Note    OUTCOME: Patient tolerated treatment/procedure well without complication and is now ready for discharge.    DISPOSITION: Home or Self Care    FINAL DIAGNOSIS:  Erectile dysfunction    FOLLOWUP: In clinic    DISCHARGE INSTRUCTIONS:    Discharge Procedure Orders   No dressing needed     Notify your health care provider if you experience any of the following:  temperature >100.4     Notify your health care provider if you experience any of the following:  persistent nausea and vomiting or diarrhea     Notify your health care provider if you experience any of the following:  severe uncontrolled pain     Notify your health care provider if you experience any of the following:  difficulty breathing or increased cough     Notify your health care provider if you experience any of the following:  severe persistent headache     Notify your health care provider if you experience any of the following:  worsening rash     Notify your health care provider if you experience any of the following:  persistent dizziness, light-headedness, or visual disturbances     Activity as tolerated

## 2025-06-27 NOTE — OP NOTE
[facility]  Operative Note    Date: 06/27/2025    Pre-Op Diagnosis: Erectile Dysfunction     Post-Op Diagnosis: Erectile Dysfunction    Procedure(s) Performed:   1.  Inflatable Penile Prosthesis (3 piece inflatable prosthesis)    Specimen(s): None     Staff Surgeon: Billy Potter MD    Assistant Surgeon: Nilay Lauren DO    Anesthesia: General endotracheal anesthesia    Indications:  Herbie Naqvi is a 69 y.o. male with a history of erectile dysfunction refractory to medical management.  After discussion with the patient, he has elected to undergo IPP.    Findings:    1.  20 cm cylinder was placed   2.  75 mL reservoir  3. Titan 0 degree used.     Estimated Blood Loss: Minimal    Drains: #15 Kavon drain to right groin    Procedure in detail:  After risks and benefits of the procedure were discussed with the patient, informed consent was obtained.  The patient  was taken back to the operating room and placed in the supine position.  TEDs and SCDs were applied and functioning. Vancomycin and gentamicin were given preoperatively.  General anesthesia was induced.  The patient was prepped and scrubbed with betadine.  A chloraprep was then used as further prep.We subsequently scrubbed our hands.  He was draped in the usual sterile fashion, and an ioban was placed over the field.  A 16 Fr alejo catheter was placed using standard sterile technique and the bladder was drained.      An approximately 4 cm transverse incision was marked at the penoscrotal junction.  This was incised sharply with a 15 blade.  Bovie electrocautery was used to dissect down to Dartos fascia.  Dartos was then incised using Metzenbaum scissors down to the level of the corpora.  A L[place] Star ring retractor was utilized for retraction.  The right corporal body was cleared of its adventitial tissues, exposing the tunica albuginea. The tunica albuginea was then marked for a corporotomy. 2 Stay sutures of 2-0 vicryl were then placed.   A corporotomy was then made between the stay sutures.  The patient's corpora were dilated serially from 10 mm up to 13 mm using Monteiro dilators distally and proximally.  Care was taken to ensure not to crossover during this portion of the procedure.  Once dilated, the patient's corporotomy was irrigated with antibiotic irrigation.  There was no evidence of urethral injury during this portion of the procedure.  The patient's corpora was then measured using the Pasquale dilator.  It measured 20.5 cm in total length.    Attention was then turned to the contralateral corporal body, and the exact same technique was performed.  Again, the adventitial tissues around it were cleared, exposing the tunica albuginea and it was marked for a corporotomy with the bovie.  A corporotomy was made after  2-0 Vicryl stay sutures were applied to either side of the corporotomy. Again, it was serially dilated from 8 mm up to 13 mm sequentially using Monteiro and Hegar dilators.  Again, care was taken not to crossover.  Again the corporal length was measured using the Pasquale.  It measured 20.5 cm in total length.    A 20 cm IPP with bilaterally was assembled.  The Pasquale was then advanced into the corpora and the needle fired through the glans, first on the left and then on the right.  The left IPP was then placed within the corpora and the corpora was closed by tying the previously placed stay sutures.  The IPP was then placed on the right, and the corpora was closed by tying the previously placed stay sutures.    At this time, the device was inflated.  There was good glans support with no SST deformity and the penis was straight.    Attention was then turned to the patient's left inguinal region.  The space of Retzius was entered just posterior to the patient's pubic bone by way of the inguinal ring.  Blunt finger dissection was used to clear space for the reservoir.  A 75 cc reservoir was then advanced and filled to 75 mL.  There was no  back pressure identified on filling.       Immediately following this, a subdartos pocket was made in the dependent portion of the scrotum. The pump device was then advanced into the dartos pouch and secured with a pursestring 2-0 Vicryl suture.  The pump, cylinders and tubing were then connected to the reservoir in the standard fashion.    The device was then inflated again.  There was good glans support with no SST deformity and the penis was straight.    It should be noted that copious amounts of antibiotic irrigation was used throughout the case. A #15 Kavon drain was placed with exit at the right groin. The drain was secured with a Nylon drain stitch.    Dartos was reapproximated in three separate layers using 2-0 Vicryl in a running fashion.  Skin was then reapproximated using 3-0 chromic in a running horizontal mattress fashion.  Derma ferguson was applied and a mummy wrap was created with scrotal fluffs and support.      The alejo was then removed.     The patient tolerated the procedure well and was transferred to the recovery room in stable condition.    Disposition: The patient will be discharged from PACU after voiding. He will be sent on 5 days of bactrim and follow up in clinic on Monday.    Nilay Lauren DO

## 2025-06-27 NOTE — TRANSFER OF CARE
"Anesthesia Transfer of Care Note    Patient: Herbie Naqvi    Procedure(s) Performed: Procedure(s) (LRB):  INSERTION, INFLATABLE PENILE PROSTHESIS (N/A)    Patient location: PACU    Anesthesia Type: general    Transport from OR: Transported from OR on 6-10 L/min O2 by face mask with adequate spontaneous ventilation    Post pain: adequate analgesia    Post assessment: no apparent anesthetic complications    Post vital signs: stable    Level of consciousness: awake and sedated    Nausea/Vomiting: no nausea/vomiting    Complications: none    Transfer of care protocol was followed      Last vitals: Visit Vitals  /85 (BP Location: Right arm, Patient Position: Lying)   Pulse 63   Temp 36.5 °C (97.7 °F) (Oral)   Resp 18   Ht 5' 11" (1.803 m)   Wt 78.9 kg (174 lb)   SpO2 97%   BMI 24.27 kg/m²     "

## 2025-06-27 NOTE — ANESTHESIA POSTPROCEDURE EVALUATION
Anesthesia Post Evaluation    Patient: Herbie Naqvi    Procedure(s) Performed: Procedure(s) (LRB):  INSERTION, INFLATABLE PENILE PROSTHESIS (N/A)    Final Anesthesia Type: general      Patient location during evaluation: PACU  Patient participation: Yes- Able to Participate  Level of consciousness: awake and alert  Post-procedure vital signs: reviewed and stable  Pain management: adequate  Airway patency: patent    PONV status at discharge: No PONV  Anesthetic complications: no      Cardiovascular status: blood pressure returned to baseline  Respiratory status: unassisted  Hydration status: euvolemic  Follow-up not needed.              Vitals Value Taken Time   /91 06/27/25 14:30   Temp 36.6 °C (97.9 °F) 06/27/25 14:00   Pulse 73 06/27/25 14:30   Resp 16 06/27/25 13:45   SpO2 95 % 06/27/25 14:30         Event Time   Out of Recovery 13:52:00         Pain/Maribell Score: Pain Rating Prior to Med Admin: 5 (6/27/2025  1:32 PM)  Pain Rating Post Med Admin: 3 (6/27/2025  1:32 PM)  Maribell Score: 10 (6/27/2025  2:30 PM)

## 2025-06-27 NOTE — Clinical Note
Diagnosis: Bleeding from J Luis-Yun drain, initial encounter [005947]   Future Attending Provider: EL PATTERSON [[id number]]   Is the patient being sent to ED Observation?: Yes

## 2025-06-28 VITALS
RESPIRATION RATE: 18 BRPM | TEMPERATURE: 98 F | WEIGHT: 170 LBS | SYSTOLIC BLOOD PRESSURE: 109 MMHG | HEART RATE: 81 BPM | OXYGEN SATURATION: 95 % | DIASTOLIC BLOOD PRESSURE: 68 MMHG | BODY MASS INDEX: 23.8 KG/M2 | HEIGHT: 71 IN

## 2025-06-28 LAB
ABSOLUTE EOSINOPHIL (OHS): 0 K/UL
ABSOLUTE EOSINOPHIL (OHS): 0.05 K/UL
ABSOLUTE MONOCYTE (OHS): 0.48 K/UL (ref 0.3–1)
ABSOLUTE MONOCYTE (OHS): 0.72 K/UL (ref 0.3–1)
ABSOLUTE NEUTROPHIL COUNT (OHS): 10.67 K/UL (ref 1.8–7.7)
ABSOLUTE NEUTROPHIL COUNT (OHS): 5.08 K/UL (ref 1.8–7.7)
ALBUMIN SERPL BCP-MCNC: 3.6 G/DL (ref 3.5–5.2)
ALP SERPL-CCNC: 55 UNIT/L (ref 40–150)
ALT SERPL W/O P-5'-P-CCNC: 19 UNIT/L (ref 10–44)
ANION GAP (OHS): 12 MMOL/L (ref 8–16)
AST SERPL-CCNC: 26 UNIT/L (ref 11–45)
BASOPHILS # BLD AUTO: 0.02 K/UL
BASOPHILS # BLD AUTO: 0.02 K/UL
BASOPHILS NFR BLD AUTO: 0.2 %
BASOPHILS NFR BLD AUTO: 0.3 %
BILIRUB SERPL-MCNC: 0.8 MG/DL (ref 0.1–1)
BUN SERPL-MCNC: 15 MG/DL (ref 8–23)
CALCIUM SERPL-MCNC: 8.8 MG/DL (ref 8.7–10.5)
CHLORIDE SERPL-SCNC: 101 MMOL/L (ref 95–110)
CO2 SERPL-SCNC: 21 MMOL/L (ref 23–29)
CREAT SERPL-MCNC: 0.8 MG/DL (ref 0.5–1.4)
ERYTHROCYTE [DISTWIDTH] IN BLOOD BY AUTOMATED COUNT: 13.9 % (ref 11.5–14.5)
ERYTHROCYTE [DISTWIDTH] IN BLOOD BY AUTOMATED COUNT: 14 % (ref 11.5–14.5)
ERYTHROCYTE [DISTWIDTH] IN BLOOD BY AUTOMATED COUNT: 14.1 % (ref 11.5–14.5)
GFR SERPLBLD CREATININE-BSD FMLA CKD-EPI: >60 ML/MIN/1.73/M2
GLUCOSE SERPL-MCNC: 200 MG/DL (ref 70–110)
HCT VFR BLD AUTO: 33.2 % (ref 40–54)
HCT VFR BLD AUTO: 34.3 % (ref 40–54)
HCT VFR BLD AUTO: 36.1 % (ref 40–54)
HGB BLD-MCNC: 10.9 GM/DL (ref 14–18)
HGB BLD-MCNC: 11.4 GM/DL (ref 14–18)
HGB BLD-MCNC: 12.2 GM/DL (ref 14–18)
IMM GRANULOCYTES # BLD AUTO: 0.02 K/UL (ref 0–0.04)
IMM GRANULOCYTES # BLD AUTO: 0.04 K/UL (ref 0–0.04)
IMM GRANULOCYTES NFR BLD AUTO: 0.3 % (ref 0–0.5)
IMM GRANULOCYTES NFR BLD AUTO: 0.3 % (ref 0–0.5)
INR PPP: 1.1 (ref 0.8–1.2)
LYMPHOCYTES # BLD AUTO: 1.14 K/UL (ref 1–4.8)
LYMPHOCYTES # BLD AUTO: 1.77 K/UL (ref 1–4.8)
MCH RBC QN AUTO: 30 PG (ref 27–31)
MCH RBC QN AUTO: 30.3 PG (ref 27–31)
MCH RBC QN AUTO: 30.3 PG (ref 27–31)
MCHC RBC AUTO-ENTMCNC: 32.8 G/DL (ref 32–36)
MCHC RBC AUTO-ENTMCNC: 33.2 G/DL (ref 32–36)
MCHC RBC AUTO-ENTMCNC: 33.8 G/DL (ref 32–36)
MCV RBC AUTO: 90 FL (ref 82–98)
MCV RBC AUTO: 91 FL (ref 82–98)
MCV RBC AUTO: 92 FL (ref 82–98)
NUCLEATED RBC (/100WBC) (OHS): 0 /100 WBC
NUCLEATED RBC (/100WBC) (OHS): 0 /100 WBC
PLATELET # BLD AUTO: 166 K/UL (ref 150–450)
PLATELET # BLD AUTO: 172 K/UL (ref 150–450)
PLATELET # BLD AUTO: 191 K/UL (ref 150–450)
PMV BLD AUTO: 8.8 FL (ref 9.2–12.9)
PMV BLD AUTO: 8.9 FL (ref 9.2–12.9)
PMV BLD AUTO: 9.3 FL (ref 9.2–12.9)
POTASSIUM SERPL-SCNC: 4.3 MMOL/L (ref 3.5–5.1)
PROT SERPL-MCNC: 6.5 GM/DL (ref 6–8.4)
PROTHROMBIN TIME: 11.9 SECONDS (ref 9–12.5)
RBC # BLD AUTO: 3.63 M/UL (ref 4.6–6.2)
RBC # BLD AUTO: 3.76 M/UL (ref 4.6–6.2)
RBC # BLD AUTO: 4.02 M/UL (ref 4.6–6.2)
RELATIVE EOSINOPHIL (OHS): 0 %
RELATIVE EOSINOPHIL (OHS): 0.7 %
RELATIVE LYMPHOCYTE (OHS): 23.1 % (ref 18–48)
RELATIVE LYMPHOCYTE (OHS): 9.2 % (ref 18–48)
RELATIVE MONOCYTE (OHS): 3.9 % (ref 4–15)
RELATIVE MONOCYTE (OHS): 9.4 % (ref 4–15)
RELATIVE NEUTROPHIL (OHS): 66.2 % (ref 38–73)
RELATIVE NEUTROPHIL (OHS): 86.4 % (ref 38–73)
SODIUM SERPL-SCNC: 134 MMOL/L (ref 136–145)
WBC # BLD AUTO: 11.11 K/UL (ref 3.9–12.7)
WBC # BLD AUTO: 12.35 K/UL (ref 3.9–12.7)
WBC # BLD AUTO: 7.66 K/UL (ref 3.9–12.7)

## 2025-06-28 PROCEDURE — 85025 COMPLETE CBC W/AUTO DIFF WBC: CPT | Mod: 91,HCNC | Performed by: NURSE PRACTITIONER

## 2025-06-28 PROCEDURE — 85025 COMPLETE CBC W/AUTO DIFF WBC: CPT | Mod: HCNC | Performed by: EMERGENCY MEDICINE

## 2025-06-28 PROCEDURE — 25000003 PHARM REV CODE 250: Mod: HCNC | Performed by: EMERGENCY MEDICINE

## 2025-06-28 PROCEDURE — 85610 PROTHROMBIN TIME: CPT | Mod: HCNC | Performed by: EMERGENCY MEDICINE

## 2025-06-28 PROCEDURE — 99024 POSTOP FOLLOW-UP VISIT: CPT | Mod: HCNC,,, | Performed by: UROLOGY

## 2025-06-28 PROCEDURE — 99283 EMERGENCY DEPT VISIT LOW MDM: CPT

## 2025-06-28 PROCEDURE — 36415 COLL VENOUS BLD VENIPUNCTURE: CPT | Mod: HCNC | Performed by: NURSE PRACTITIONER

## 2025-06-28 PROCEDURE — G0378 HOSPITAL OBSERVATION PER HR: HCPCS

## 2025-06-28 PROCEDURE — 85027 COMPLETE CBC AUTOMATED: CPT | Mod: HCNC | Performed by: EMERGENCY MEDICINE

## 2025-06-28 PROCEDURE — 36415 COLL VENOUS BLD VENIPUNCTURE: CPT | Mod: HCNC | Performed by: EMERGENCY MEDICINE

## 2025-06-28 PROCEDURE — 80053 COMPREHEN METABOLIC PANEL: CPT | Mod: HCNC | Performed by: EMERGENCY MEDICINE

## 2025-06-28 RX ORDER — TALC
6 POWDER (GRAM) TOPICAL NIGHTLY PRN
Status: DISCONTINUED | OUTPATIENT
Start: 2025-06-28 | End: 2025-06-28 | Stop reason: HOSPADM

## 2025-06-28 RX ORDER — SODIUM CHLORIDE 0.9 % (FLUSH) 0.9 %
10 SYRINGE (ML) INJECTION
Status: DISCONTINUED | OUTPATIENT
Start: 2025-06-28 | End: 2025-06-28 | Stop reason: HOSPADM

## 2025-06-28 RX ORDER — HYDROCODONE BITARTRATE AND ACETAMINOPHEN 5; 325 MG/1; MG/1
1 TABLET ORAL
Refills: 0 | Status: COMPLETED | OUTPATIENT
Start: 2025-06-28 | End: 2025-06-28

## 2025-06-28 RX ORDER — IBUPROFEN 600 MG/1
600 TABLET, FILM COATED ORAL EVERY 6 HOURS PRN
Status: DISCONTINUED | OUTPATIENT
Start: 2025-06-28 | End: 2025-06-28 | Stop reason: HOSPADM

## 2025-06-28 RX ORDER — HYDROCODONE BITARTRATE AND ACETAMINOPHEN 5; 325 MG/1; MG/1
1 TABLET ORAL EVERY 4 HOURS PRN
Refills: 0 | Status: DISCONTINUED | OUTPATIENT
Start: 2025-06-28 | End: 2025-06-28 | Stop reason: HOSPADM

## 2025-06-28 RX ADMIN — HYDROCODONE BITARTRATE AND ACETAMINOPHEN 1 TABLET: 5; 325 TABLET ORAL at 01:06

## 2025-06-28 RX ADMIN — IBUPROFEN 600 MG: 600 TABLET, FILM COATED ORAL at 04:06

## 2025-06-28 NOTE — ED NOTES
Pt resting on stretcher. AAO x 3. HOB elevated. RR even and unlabored. Pt's NPO at present. Pain 7/10. Restroom and comfort needs addressed. NADN. Pt updated on POC. Informed urology should be coming sometime this morning. Call light within reach. Side rails up x 2. Pt not currently on any continuous monitoring. Will continue to monitor.

## 2025-06-28 NOTE — ED PROVIDER NOTES
Encounter Date: 6/27/2025       History     Chief Complaint   Patient presents with    Post-op Problem     Patient had surgical implant place by urologist today at this facility , bleeding increased and states he has not stop bleeding since leaving facility this evening      HPI  Herbie Naqvi is a 69 y.o. male, PMH prediabetes, prostate cancer, putting a glaucoma, erectile dysfunction, presenting with complaints of post-op bleeding. Pt reports getting discharged at 1900 from penile implant surgery with Urology. He states that he had bleeding immediately post-op around the LEXX drain site.  He was seen by staff and cleared for discharge.  Patient reports since he went home he has a ongoing drainage around the LEXX site, he reports dumping 25 cc of blood from the drain whenever he arrived home.  At the time of arrival patient has 50 cc of blood and LEXX drain.  He denies any pain.  Patient denies any obvious pulling on the drain that could have dislodged it.  Patient reports some lightheadedness/dizziness with standing tonight.    Review of patient's allergies indicates:  No Known Allergies  Past Medical History:   Diagnosis Date    Prostate cancer     Wears prescription eyeglasses      Past Surgical History:   Procedure Laterality Date    COLONOSCOPY N/A 11/30/2017    Procedure: COLONOSCOPY;  Surgeon: John Roman MD;  Location: [facility];  Service: Endoscopy;  Laterality: N/A;  colonoscopy confirmed    COLONOSCOPY, SCREENING, LOW RISK PATIENT N/A 05/19/2025    Procedure: COLONOSCOPY, SCREENING, LOW RISK PATIENT;  Surgeon: Brady Bhatt MD;  Location: [facility] ([address] [place] [address]);  Service: Endoscopy;  Laterality: N/A;  4/3 ref by  Edwin Lawson MD, PEG, portal-gg  5/8 Precall completed with spouse.     ROBOT-ASSISTED LAPAROSCOPIC PROSTATECTOMY N/A 12/20/2023    Procedure: ROBOTIC PROSTATECTOMY;  Surgeon: Armani Miller MD;  Location: [facility];  Service: Urology;  Laterality: N/A;    wrist broke  Right      rt side wrist     Family History   Problem Relation Name Age of Onset    Diabetes Mother age 60     Heart disease Father age 57     Hyperlipidemia Father age 57     Cancer Daughter          breast     Social History[1]  Review of Systems    Physical Exam     Initial Vitals [06/27/25 2343]   BP Pulse Resp Temp SpO2   127/83 105 18 97.8 °F (36.6 °C) 97 %      MAP       --         Physical Exam    Nursing note and vitals reviewed.  Constitutional: He appears well-developed and well-nourished. He is not diaphoretic. No distress.   Cardiovascular:  Normal rate and regular rhythm.     Exam reveals no gallop and no friction rub.       No murmur heard.  Pulmonary/Chest: No accessory muscle usage. No respiratory distress. He has no decreased breath sounds. He has no wheezes.   Abdominal: He exhibits no shifting dullness, no distension and no fluid wave. There is no abdominal tenderness. There is no rebound and no guarding.   Genitourinary:    Genitourinary Comments: Nursing at bedside for duration of  exam. Penis still wrapped in post-op bandages with out obvious bleeding. LEXX drain site with blood surrounding the drain and in surrounding bandages. LEXX drain in place and does not appear to be malaligned.        Neurological: He is alert.   Skin: Skin is warm and dry. Capillary refill takes less than 2 seconds.         ED Course   Procedures  Labs Reviewed   COMPREHENSIVE METABOLIC PANEL - Abnormal       Result Value    Sodium 134 (*)     Potassium 4.3      Chloride 101      CO2 21 (*)     Glucose 200 (*)     BUN 15      Creatinine 0.8      Calcium 8.8      Protein Total 6.5      Albumin 3.6      Bilirubin Total 0.8      ALP 55      AST 26      ALT 19      Anion Gap 12      eGFR >60     CBC WITH DIFFERENTIAL - Abnormal    WBC 12.35      RBC 4.02 (*)     HGB 12.2 (*)     HCT 36.1 (*)     MCV 90      MCH 30.3      MCHC 33.8      RDW 14.1      Platelet Count 191      MPV 8.9 (*)     Nucleated RBC 0      Neut % 86.4 (*)      Lymph % 9.2 (*)     Mono % 3.9 (*)     Eos % 0.0      Basophil % 0.2      Imm Grans % 0.3      Neut # 10.67 (*)     Lymph # 1.14      Mono # 0.48      Eos # 0.00      Baso # 0.02      Imm Grans # 0.04     PROTIME-INR - Normal    PT 11.9      INR 1.1     CBC W/ AUTO DIFFERENTIAL    Narrative:     The following orders were created for panel order CBC auto differential.  Procedure                               Abnormality         Status                     ---------                               -----------         ------                     CBC with Differential[[id number]]       Abnormal            Final result                 Please view results for these tests on the individual orders.          Imaging Results    None          Medications - No data to display  Medical Decision Making  Herbie Naqvi  is a 69 y.o. male, presenting with complaints of blood leaking around LEXX drain postop from penile implantation surgery earlier today, history of present illness obtained from pt  as seen above. Patient able to provide adequate and detailed history of present illness. Patient found to be well appearing and in no acute distress , stable. Exam notable as above.     DDX includes but is not limited to:  Postop drain malfunction, normal postop bleeding, intra-abdominal infection, blood loss anemia.    Patient has estimated loss of 75 cc from LEXX drain.  He has not change his bandages since discharge.  Patient noted to be anemic but not requiring transfusion at this time.  He has been hemodynamically stable throughout his ED stay.  He has not have signs or symptoms of systemic infection.  Patient had Douglas catheter placed postop for urinary retention that is still in place and not giving him any problems at this time.    See ED course for additional discussion. I discussed this case with Urology who agreed to evaluate and provide recommendations for this patient.  Urology recommending admission to EDOU and will check on him in  the morning. Will get repeat CBC in the morning to evaluate for blood loss anemia.     Data Reviewed/Counseling: I have reviewed the patient's vital signs, nursing notes, and other relevant tests/information. Any incidental findings were discussed with the patient. I had a detailed discussion with the patient regarding the historical points, exam findings, and any diagnostic results supporting the diagnosis. Discussed case with EDOU, who agreed to evaluate and admit patient.    Disposition: Admitted    Amount and/or Complexity of Data Reviewed  Labs:  Decision-making details documented in ED Course.               ED Course as of 06/28/25 0119   Sat Jun 28, 2025 0109 Hemoglobin(!): 12.2  Not requiring transfusion at this time  [HB]   0109 Hematocrit(!): 36.1 [HB]   0109 PT: 11.9 [HB]   0109 INR: 1.1 [HB]   0109 Platelet Count: 191  wnl [HB]   0109 WBC: 12.35  No leukocytosis [HB]   0110 Gran # (ANC)(!): 10.67  Left shift likely secondary to recent surgery [HB]      ED Course User Index  [HB] Doreen Roman MD                           Clinical Impression:  Final diagnoses:  [T85.838A] Bleeding from J Luis-Yun drain, initial encounter (Primary)  [R42] Lightheadedness              Launch MDCalc MDM  MDCalc MDM Module  Jun 28 2025 1:18 AM [Doreen Roman]  Data:  - Test/documents/historian: 3 tests ordered  Additional encounter diagnoses: Bleeding from J Luis-Yun drain, initial encounter, Lightheadedness             [1]   Social History  Tobacco Use    Smoking status: Former     Types: Cigarettes     Start date: 3/25/2014     Passive exposure: Never    Smokeless tobacco: Never   Vaping Use    Vaping status: Former   Substance Use Topics    Alcohol use: No     Comment: former drinker social stop drinking    Drug use: No        Doreen Roman MD  Resident  06/28/25 0119

## 2025-06-28 NOTE — H&P
[facility] - Emergency Dept (Observation)  Urology  History & Physical    Patient Name: Herbie Naqvi  MRN: [id number]  Admission Date: 6/27/2025  Code Status: Full Code   Attending Provider: No att. providers found   Primary Care Physician: Edwin Lawson MD  Principal Problem:<principal problem not specified>    Subjective:     HPI:     1 day post op IPP with Dr Potter    He came to the ER concerned about the amount of bleeding around LEXX    Was monitored in EDOU overnight    Doing well  Well drainage around LEXX    Hungry  Pain well controlled    Eager to go home    Past Medical History:   Diagnosis Date    Prostate cancer     Wears prescription eyeglasses        Past Surgical History:   Procedure Laterality Date    COLONOSCOPY N/A 11/30/2017    Procedure: COLONOSCOPY;  Surgeon: John Roman MD;  Location: [facility];  Service: Endoscopy;  Laterality: N/A;  colonoscopy confirmed    COLONOSCOPY, SCREENING, LOW RISK PATIENT N/A 05/19/2025    Procedure: COLONOSCOPY, SCREENING, LOW RISK PATIENT;  Surgeon: Brady Bhatt MD;  Location: [facility] ([address] [place] [address]);  Service: Endoscopy;  Laterality: N/A;  4/3 ref by  Edwin Lawson MD, PEG, portal-gg  5/8 Precall completed with spouse. SS    ROBOT-ASSISTED LAPAROSCOPIC PROSTATECTOMY N/A 12/20/2023    Procedure: ROBOTIC PROSTATECTOMY;  Surgeon: Armani Miller MD;  Location: [facility];  Service: Urology;  Laterality: N/A;    wrist broke  Right     rt side wrist       Review of patient's allergies indicates:  No Known Allergies    Family History       Problem Relation (Age of Onset)    Cancer Daughter    Diabetes Mother    Heart disease Father    Hyperlipidemia Father            Tobacco Use    Smoking status: Former     Types: Cigarettes     Start date: 3/25/2014     Passive exposure: Never    Smokeless tobacco: Never   Vaping Use    Vaping status: Former   Substance and Sexual Activity    Alcohol use: No     Comment: former drinker social stop drinking     Drug use: No    Sexual activity: Yes     Partners: Female     Birth control/protection: None     Comment:        Review of Systems    Objective:     Temp:  [97.3 °F (36.3 °C)-98 °F (36.7 °C)] 97.7 °F (36.5 °C)  Pulse:  [] 65  Resp:  [16-20] 16  SpO2:  [92 %-100 %] 96 %  BP: (107-151)/(63-98) 122/63     Body mass index is 23.71 kg/m².    I/O last 3 completed shifts:  In: -   Out: 850 [Urine:800; Other:50]       Lines/Drains/Airways       Drain  Duration                  Closed/Suction Drain 06/27/25 1156 Groin Bulb 15 Fr. <1 day         Urethral Catheter 06/27/25 1755 <1 day                    Physical Exam    Ao*4  resp normal rate' breathing not labored  cv normal rate  Ab nondistended  Ext no edema    Dressing intact on penis; alejo in place  LEXX stripped; minimal output; dressing with small dime-size blood stain  No active bleeding    Significant Labs:  BMP:  Recent Labs   Lab 06/28/25  0036   *   K 4.3      CO2 21*   BUN 15   CREATININE 0.8   CALCIUM 8.8       CBC:  Recent Labs   Lab 06/28/25  0036 06/28/25  0345   WBC 12.35 11.11   HGB 12.2* 11.4*   HCT 36.1* 34.3*    172           Significant Imaging:  -    Assessment and Plan:     There are no hospital problems to display for this patient.      VTE Risk Mitigation (From admission, onward)           Ordered     IP VTE HIGH RISK PATIENT  Once         06/28/25 0150                    Sp IPP    Provided reassurance  No need to any surgical intervention  OK to DC home with LEXX and alejo  Change LEXX dressing prn  Keep coban on penis and alejo in place    See Dr Potter on Mon 6/30 at [facility] urology clinic as previously scheduled    Appreciate the EDOU assisting with his care    Armani Miller MD  Urology  [facility] - Emergency Dept (Observation)

## 2025-06-28 NOTE — ED NOTES
IV removed from L upper forearm. IV cath clean and intact. LEXX tube drained with 20-25 mL sanguineous drainage. Pt given discharge paperwork, with f/u with  on Monday. Changed Douglas bag to leg bag to maintain privacy.

## 2025-06-28 NOTE — H&P
[facility] ED Observation Unit  History and Physical      I assumed care of this patient from the Emergency Department at onset of observation time, 1:44 AM on 06/28/2025.       History of Present Illness:  Herbie Naqvi is a 69 y.o. male, PMH prediabetes, prostate cancer, putting a glaucoma, erectile dysfunction, presenting with complaints of post-op bleeding. Pt reports getting discharged at 1900 from penile implant surgery with Urology. He states that he had bleeding immediately post-op around the LEXX drain site.  He was seen by staff and cleared for discharge.  Patient reports since he went home he has a ongoing drainage around the LEXX site, he reports dumping 25 cc of blood from the drain whenever he arrived home.  At the time of arrival patient has 50 cc of blood and LEXX drain.  He denies any pain.  Patient denies any obvious pulling on the drain that could have dislodged it.  Patient reports some lightheadedness/dizziness with standing tonight.     ED Course:  Labs obtained, reassuring.  Discussed patient with urology on-call, who state they will evaluate the patient tomorrow morning if they are placed in the EDOU.    I reviewed the ED Provider Note dated 06/28/2025 prior to my evaluation of this patient.  I reviewed all labs and imaging performed in the Main ED, prior to patient being placed in Observation. Patient was placed in the ED Observation Unit for postop bleeding.    PMHx   Past Medical History:   Diagnosis Date    Prostate cancer     Wears prescription eyeglasses       Past Surgical History:   Procedure Laterality Date    COLONOSCOPY N/A 11/30/2017    Procedure: COLONOSCOPY;  Surgeon: John Roman MD;  Location: [facility];  Service: Endoscopy;  Laterality: N/A;  colonoscopy confirmed    COLONOSCOPY, SCREENING, LOW RISK PATIENT N/A 05/19/2025    Procedure: COLONOSCOPY, SCREENING, LOW RISK PATIENT;  Surgeon: Brady Bhatt MD;  Location: [facility] ([address] [place] [address]);  Service: Endoscopy;   "Laterality: N/A;  4/3 ref by  Edwin Lawson MD, PEG, portal-gg  5/8 Precall completed with spouse. SS    ROBOT-ASSISTED LAPAROSCOPIC PROSTATECTOMY N/A 12/20/2023    Procedure: ROBOTIC PROSTATECTOMY;  Surgeon: Armani Miller MD;  Location: [facility];  Service: Urology;  Laterality: N/A;    wrist broke  Right     rt side wrist        Family Hx   Family History   Problem Relation Name Age of Onset    Diabetes Mother age 60     Heart disease Father age 57     Hyperlipidemia Father age 57     Cancer Daughter          breast        Social Hx   Social History     Socioeconomic History    Marital status:    Tobacco Use    Smoking status: Former     Types: Cigarettes     Start date: 3/25/2014     Passive exposure: Never    Smokeless tobacco: Never   Vaping Use    Vaping status: Former   Substance and Sexual Activity    Alcohol use: No     Comment: former drinker social stop drinking    Drug use: No    Sexual activity: Yes     Partners: Female     Birth control/protection: None     Comment:         Vital Signs   Vitals:    06/27/25 2343 06/28/25 0100 06/28/25 0132   BP: 127/83 111/77    Pulse: 105 89    Resp: 18  16   Temp: 97.8 °F (36.6 °C)     TempSrc: Oral     SpO2: 97% 96%    Weight: 77.1 kg (170 lb)     Height: 5' 11" (1.803 m)         Review of Systems  ROS    Brief Physical Exam/Reassessment   Physical Exam    Constitutional: He appears well-developed and well-nourished. He is not diaphoretic. No distress.   Cardiovascular:  Normal rate.           Pulmonary/Chest: No respiratory distress.   Genitourinary:    Genitourinary Comments: Penis wrapped in post-op bandage without obvious bleeding. LEXX drain site with dark blood on surrounding bandages. LEXX drain in place with dark blood in bulb.       Neurological: He is alert and oriented to person, place, and time.   Skin: Skin is warm and dry.         Abnormal Labs Reviewed   COMPREHENSIVE METABOLIC PANEL - Abnormal; Notable for the following " components:       Result Value    Sodium 134 (*)     CO2 21 (*)     Glucose 200 (*)     All other components within normal limits   CBC WITH DIFFERENTIAL - Abnormal; Notable for the following components:    RBC 4.02 (*)     HGB 12.2 (*)     HCT 36.1 (*)     MPV 8.9 (*)     Neut % 86.4 (*)     Lymph % 9.2 (*)     Mono % 3.9 (*)     Neut # 10.67 (*)     All other components within normal limits       No orders to display         Medications:   Scheduled Meds:   LIDOcaine (PF) 10 mg/ml (1%)  5 mL Other 1 time in Clinic/HOD     Continuous Infusions:  PRN Meds:.      Assessment/Plan:    1. Bleeding from J Luis-Yun drain, initial encounter    2. Lightheadedness         - repeat CBC in AM  - Urology evaluation in AM for post-op bleeding

## 2025-06-28 NOTE — H&P
[facility] ED Observation Unit  History and Physical      I assumed care of this patient from the Emergency Department at onset of my shift at 11am.        History of Present Illness:  ***     ED Course:  ***    I reviewed the ED Provider Note dated 06/28/2025 *** prior to my evaluation of this patient.  I reviewed all labs and imaging performed in the Main ED, prior to patient being placed in Observation. Patient was placed in the ED Observation Unit for ***    PMHx   Past Medical History:   Diagnosis Date    Prostate cancer     Wears prescription eyeglasses       Past Surgical History:   Procedure Laterality Date    COLONOSCOPY N/A 11/30/2017    Procedure: COLONOSCOPY;  Surgeon: John Roman MD;  Location: [facility];  Service: Endoscopy;  Laterality: N/A;  colonoscopy confirmed    COLONOSCOPY, SCREENING, LOW RISK PATIENT N/A 05/19/2025    Procedure: COLONOSCOPY, SCREENING, LOW RISK PATIENT;  Surgeon: Brady Bhatt MD;  Location: [facility] ([address] [place] [address]);  Service: Endoscopy;  Laterality: N/A;  4/3 ref by  Edwin Lawson MD, PEG, portal-gg  5/8 Precall completed with spouse. SS    ROBOT-ASSISTED LAPAROSCOPIC PROSTATECTOMY N/A 12/20/2023    Procedure: ROBOTIC PROSTATECTOMY;  Surgeon: Armani Miller MD;  Location: [facility];  Service: Urology;  Laterality: N/A;    wrist broke  Right     rt side wrist        Family Hx   Family History   Problem Relation Name Age of Onset    Diabetes Mother age 60     Heart disease Father age 57     Hyperlipidemia Father age 57     Cancer Daughter          breast        Social Hx   Social History     Socioeconomic History    Marital status:    Tobacco Use    Smoking status: Former     Types: Cigarettes     Start date: 3/25/2014     Passive exposure: Never    Smokeless tobacco: Never   Vaping Use    Vaping status: Former   Substance and Sexual Activity    Alcohol use: No     Comment: former drinker social stop drinking    Drug use: No    Sexual activity: Yes      Partners: Female     Birth control/protection: None     Comment:         Vital Signs   Vitals:    06/28/25 0132 06/28/25 0200 06/28/25 0421 06/28/25 0810   BP:  114/75 107/69 122/63   Pulse:  83 79 65   Resp: 16  20 16   Temp:   98 °F (36.7 °C) 97.7 °F (36.5 °C)   TempSrc:   Oral Oral   SpO2:  97% 98% 96%   Weight:       Height:        06/28/25 1206   BP: 109/67   Pulse: 70   Resp: 18   Temp: 97.4 °F (36.3 °C)   TempSrc: Oral   SpO2: 96%   Weight:    Height:        Review of Systems  ROS    Brief Physical Exam/Reassessment   Physical Exam    Abnormal Labs Reviewed   COMPREHENSIVE METABOLIC PANEL - Abnormal; Notable for the following components:       Result Value    Sodium 134 (*)     CO2 21 (*)     Glucose 200 (*)     All other components within normal limits   CBC WITH DIFFERENTIAL - Abnormal; Notable for the following components:    RBC 4.02 (*)     HGB 12.2 (*)     HCT 36.1 (*)     MPV 8.9 (*)     Neut % 86.4 (*)     Lymph % 9.2 (*)     Mono % 3.9 (*)     Neut # 10.67 (*)     All other components within normal limits   CBC WITHOUT DIFFERENTIAL - Abnormal; Notable for the following components:    RBC 3.76 (*)     HGB 11.4 (*)     HCT 34.3 (*)     MPV 8.8 (*)     All other components within normal limits   CBC WITH DIFFERENTIAL - Abnormal; Notable for the following components:    RBC 3.63 (*)     HGB 10.9 (*)     HCT 33.2 (*)     All other components within normal limits       No orders to display         Medications:   Scheduled Meds:   LIDOcaine (PF) 10 mg/ml (1%)  5 mL Other 1 time in Clinic/HOD     Continuous Infusions:  PRN Meds:.  Current Facility-Administered Medications:     HYDROcodone-acetaminophen, 1 tablet, Oral, Q4H PRN    ibuprofen, 600 mg, Oral, Q6H PRN    melatonin, 6 mg, Oral, Nightly PRN    sodium chloride 0.9%, 10 mL, Intravenous, PRN      Assessment/Plan:    1. Bleeding from J Luis-Yun drain, initial encounter    2. Lightheadedness    3. Post-op pain        Case was discussed with  the ED provider, ***

## 2025-06-28 NOTE — DISCHARGE SUMMARY
[facility] ED Observation Unit  Discharge Summary        History of Present Illness:    Herbie Naqvi is a 69 y.o. male, PMH prediabetes, prostate cancer, putting a glaucoma, erectile dysfunction, presenting with complaints of post-op bleeding. Pt reports getting discharged at 1900 from penile implant surgery with Urology. He states that he had bleeding immediately post-op around the LEXX drain site.  He was seen by staff and cleared for discharge.  Patient reports since he went home he has a ongoing drainage around the LEXX site, he reports dumping 25 cc of blood from the drain whenever he arrived home.  At the time of arrival patient has 50 cc of blood and LEXX drain.  He denies any pain.  Patient denies any obvious pulling on the drain that could have dislodged it.  Patient reports some lightheadedness/dizziness with standing tonight.      ED Course:  Labs obtained, reassuring.  Discussed patient with urology on-call, who state they will evaluate the patient tomorrow morning if they are placed in the EDOU.     Observation Course:    Minus to the EDOU overnight with no continued bleeding.  Patient was evaluated by Urology today who recommends no additional interventions.  Patient has a follow up appointment with his urologist on Monday.  Strict return precautions if he develops any increasing pain or bleeding he can return to emergency department for re-evaluation.  He stated understanding.    Brief Physical Exam/Reassessment   Review of Systems   Constitutional:  Negative for chills and fever.   Gastrointestinal:  Negative for abdominal pain.   Genitourinary:         No penile pain or bleeding   Neurological:  Negative for weakness and headaches.   All other systems reviewed and are negative.      Physical Exam    Nursing note and vitals reviewed.  Constitutional: Vital signs are normal. He appears well-developed and well-nourished. He does not appear ill. No distress.   Cardiovascular:  Normal rate and regular rhythm.            Pulmonary/Chest: Effort normal and breath sounds normal.   Genitourinary:    Genitourinary Comments: Intact dressing with no overt signs of bleeding.       Neurological: He is alert and oriented to person, place, and time. GCS eye subscore is 4. GCS verbal subscore is 5. GCS motor subscore is 6.   Skin: Skin is warm, dry and intact.         Consultants:    Urology    ED/OBS Workup:  Vitals:    06/28/25 0200 06/28/25 0421 06/28/25 0810 06/28/25 1206   BP: 114/75 107/69 122/63 109/67   Pulse: 83 79 65 70   Resp:  20 16 18   Temp:  98 °F (36.7 °C) 97.7 °F (36.5 °C) 97.4 °F (36.3 °C)   TempSrc:  Oral Oral Oral   SpO2: 97% 98% 96% 96%   Weight:       Height:        06/28/25 1610   BP: 109/68   Pulse: 81   Resp: 18   Temp: 97.5 °F (36.4 °C)   TempSrc: Oral   SpO2: 95%   Weight:    Height:        Abnormal Labs Reviewed   COMPREHENSIVE METABOLIC PANEL - Abnormal; Notable for the following components:       Result Value    Sodium 134 (*)     CO2 21 (*)     Glucose 200 (*)     All other components within normal limits   CBC WITH DIFFERENTIAL - Abnormal; Notable for the following components:    RBC 4.02 (*)     HGB 12.2 (*)     HCT 36.1 (*)     MPV 8.9 (*)     Neut % 86.4 (*)     Lymph % 9.2 (*)     Mono % 3.9 (*)     Neut # 10.67 (*)     All other components within normal limits   CBC WITHOUT DIFFERENTIAL - Abnormal; Notable for the following components:    RBC 3.76 (*)     HGB 11.4 (*)     HCT 34.3 (*)     MPV 8.8 (*)     All other components within normal limits   CBC WITH DIFFERENTIAL - Abnormal; Notable for the following components:    RBC 3.63 (*)     HGB 10.9 (*)     HCT 33.2 (*)     All other components within normal limits       No orders to display       Final Diagnosis:  1. Bleeding from J Luis-Yun drain, initial encounter    2. Lightheadedness    3. Post-op pain        Plan:  Discharge home with close follow up with the urologist on Monday.    Discharge Condition: Good    Disposition: Home or Self  Care     Time spent on the discharge of the patient including review of hospital course with the patient. reviewing discharge medications and arranging follow-up care 35 minutes.  Patient was seen and examined on the date of discharge and determined to be suitable for discharge.    Follow Up:   ED Disposition Condition    Discharge Stable            ED Prescriptions    None       Follow-up Information       Follow up With Specialties Details Why Contact Info    [facility] - Emergency Dept Emergency Medicine Go to  If symptoms worsen [address] [facility] [id number] [phone]    Billy Potter MD Urology Go to   [address] [facility]  [address] [place] [address]  [phone]              Future Appointments   Date Time Provider Department Center   6/30/2025  8:00 AM Billy Potter MD [facility] UROL[facility]   10/24/2025 10:15 AM Armani Miller MD [facility] UROLOGY [facility]   1/30/2026  9:00 AM Edwin Lawson MD [facility] MED [place]

## 2025-06-28 NOTE — PLAN OF CARE
Herbie Naqvi has met all discharge criteria from Phase II. Vital Signs are stable, ambulating  without difficulty. Discharge instructions given, patient verbalized understanding. Discharged from facility via wheelchair in stable condition.

## 2025-06-28 NOTE — TELEPHONE ENCOUNTER
Wife is calling because she states patient is pouring out blood around the drains.   There are also clots. Advised as per protocol to call 911. Patient refusing and states his wife will drive him.  Error: wife first described the bleeding as from the penis. But during further assessment the blood it at the ELXX drain insertion site.

## 2025-06-28 NOTE — TELEPHONE ENCOUNTER
Pt's wife reports pt had surgery today and has a tube at the top of his penis that is pouring out blood, states he has lost a lot of blood and is feeling weak, Wife advised to call 911 now per protocol, but states she will just drive him back to the ED now. Wife/Pt encouraged to call back with any worsening symptoms or questions. She verbalized understanding.    Reason for Disposition   Sounds like a life-threatening emergency to the triager    Protocols used: Post-Op Symptoms and Gcohqnlsr-X-SM

## 2025-06-28 NOTE — TELEPHONE ENCOUNTER
Pouring out blood from his penis.  Reason for Disposition   Shock suspected (e.g., cold/pale/clammy skin, too weak to stand, low BP, rapid pulse)    Additional Information   Negative: Sounds like a life-threatening emergency to the triager   Negative: Chest pain   Negative: Difficulty breathing   Negative: Acting confused (e.g., disoriented, slurred speech) or excessively sleepy   Negative: [1] Pain or burning with passing urine (urination) AND [2] male   Negative: Constipation   Negative: New or worsening leg (calf, thigh) pain   Negative: New or worsening leg swelling   Negative: Dizziness is severe or lasts > 24 hours after surgery   Negative: Pain, redness, swelling, or pus at IV site (current or recent)   Negative: Medication question   Negative: Cast, symptoms or questions about    Jluis-Yun, Hemovac, or Penrose drain, symptoms or questions about    Protocols used: Post-Op Symptoms and [id number]-AG, Post-Op Wound Drains and Tubes - Symptoms and Bfhsumxoj-I-WZ

## 2025-06-30 ENCOUNTER — PATIENT OUTREACH (OUTPATIENT)
Dept: ADMINISTRATIVE | Facility: CLINIC | Age: 70
End: 2025-06-30
Payer: MEDICARE

## 2025-06-30 ENCOUNTER — OFFICE VISIT (OUTPATIENT)
Dept: UROLOGY | Facility: CLINIC | Age: 70
End: 2025-06-30
Payer: MEDICARE

## 2025-06-30 VITALS
SYSTOLIC BLOOD PRESSURE: 134 MMHG | HEART RATE: 73 BPM | HEIGHT: 71 IN | WEIGHT: 170 LBS | BODY MASS INDEX: 23.8 KG/M2 | DIASTOLIC BLOOD PRESSURE: 84 MMHG

## 2025-06-30 DIAGNOSIS — N52.31 ERECTILE DYSFUNCTION AFTER RADICAL PROSTATECTOMY: Primary | ICD-10-CM

## 2025-06-30 PROCEDURE — 3044F HG A1C LEVEL LT 7.0%: CPT | Mod: CPTII,S$GLB,, | Performed by: STUDENT IN AN ORGANIZED HEALTH CARE EDUCATION/TRAINING PROGRAM

## 2025-06-30 PROCEDURE — 99024 POSTOP FOLLOW-UP VISIT: CPT | Mod: S$GLB,,, | Performed by: STUDENT IN AN ORGANIZED HEALTH CARE EDUCATION/TRAINING PROGRAM

## 2025-06-30 PROCEDURE — 1125F AMNT PAIN NOTED PAIN PRSNT: CPT | Mod: CPTII,S$GLB,, | Performed by: STUDENT IN AN ORGANIZED HEALTH CARE EDUCATION/TRAINING PROGRAM

## 2025-06-30 PROCEDURE — 3079F DIAST BP 80-89 MM HG: CPT | Mod: CPTII,S$GLB,, | Performed by: STUDENT IN AN ORGANIZED HEALTH CARE EDUCATION/TRAINING PROGRAM

## 2025-06-30 PROCEDURE — 3075F SYST BP GE 130 - 139MM HG: CPT | Mod: CPTII,S$GLB,, | Performed by: STUDENT IN AN ORGANIZED HEALTH CARE EDUCATION/TRAINING PROGRAM

## 2025-06-30 PROCEDURE — 1159F MED LIST DOCD IN RCRD: CPT | Mod: CPTII,S$GLB,, | Performed by: STUDENT IN AN ORGANIZED HEALTH CARE EDUCATION/TRAINING PROGRAM

## 2025-06-30 PROCEDURE — 99999 PR PBB SHADOW E&M-EST. PATIENT-LVL IV: CPT | Mod: PBBFAC,,, | Performed by: STUDENT IN AN ORGANIZED HEALTH CARE EDUCATION/TRAINING PROGRAM

## 2025-06-30 RX ORDER — SULFAMETHOXAZOLE AND TRIMETHOPRIM 800; 160 MG/1; MG/1
1 TABLET ORAL 2 TIMES DAILY
Qty: 10 TABLET | Refills: 0 | Status: SHIPPED | OUTPATIENT
Start: 2025-06-30 | End: 2025-07-05

## 2025-06-30 RX ORDER — POLYETHYLENE GLYCOL 3350 17 G/17G
17 POWDER, FOR SOLUTION ORAL DAILY
Qty: 30 PACKET | Refills: 0 | Status: SHIPPED | OUTPATIENT
Start: 2025-06-30

## 2025-06-30 NOTE — LETTER
June 30, 2025      [facility] - U[facility] [address]  [address]  [facility] [place] [address]  Phone: [phone]  Fax: [phone]       Patient: Jamar Sheets  YOB: 2001  Date of Visit: 06/30/2025    To Whom It May Concern:    Jamar Sheets was at [facility] on 06/30/2025. The patient may return to work/school on 07/01/2025 with no restrictions. If you have any questions or concerns, or if I can be of further assistance, please do not hesitate to contact me.    Sincerely,    Perla Albert LPN

## 2025-06-30 NOTE — PROGRESS NOTES
"[facility] - Urology   Clinic Note    SUBJECTIVE:     Chief Complaint: ED    History of Present Illness:  Herbie Naqvi is a 69 y.o. male who presents to clinic for ED. He is established to our clinic but new to me.     Tried and failed PDE5i's and Trimix.  He underwent RALP with BPLND with Dr. Miller in 2023. 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    Denies any prior history of MI, CVA, or CAD.     He underwent placement of 3-piece IPP (Coloplast) with me on 6/27/25. He failed VT in postop and Douglas was placed. He was discharged POD0 but returned to ED POD1 out of concern for leaking around LEXX site and for LEXX output. He was monitored overnight, reassured, and discharged POD1.   Having some constipation.  Drain output was 50 cc yesterday.      OBJECTIVE:     Estimated body mass index is 23.71 kg/m² as calculated from the following:    Height as of this encounter: 5' 11" (1.803 m).    Weight as of this encounter: 77.1 kg (169 lb 15.6 oz).    Vital Signs (Most Recent)  Pulse: 73 (06/30/25 0821)  BP: 134/84 (06/30/25 0821)    Physical Exam  Constitutional:       General: He is not in acute distress.     Appearance: Normal appearance.   HENT:      Head: Normocephalic and atraumatic.   Eyes:      Extraocular Movements: Extraocular movements intact.      Pupils: Pupils are equal, round, and reactive to light.   Cardiovascular:      Rate and Rhythm: Normal rate.   Pulmonary:      Effort: Pulmonary effort is normal. No respiratory distress.   Abdominal:      General: Abdomen is flat. There is no distension.      Tenderness: There is no abdominal tenderness. There is no right CVA tenderness or left CVA tenderness.   Genitourinary:     Comments: 3-piece IPP in place; dressing removed, penoscrotal incision with dermabond c/d/I; LEXX x 1 with SS output; Douglas draining clear yellow urine    Skin:     Coloration: Skin is not jaundiced.   Neurological:      General: No focal " deficit present.      Mental Status: He is alert and oriented to person, place, and time.   Psychiatric:         Mood and Affect: Mood normal.         Behavior: Behavior normal.       Lab Results   Component Value Date    BUN 15 06/28/2025    CREATININE 0.8 06/28/2025    WBC 7.66 06/28/2025    HGB 10.9 (L) 06/28/2025    HCT 33.2 (L) 06/28/2025     06/28/2025    AST 26 06/28/2025    ALT 19 06/28/2025    ALKPHOS 55 06/28/2025    ALBUMIN 3.6 06/28/2025    HGBA1C 5.9 (H) 01/30/2025        Lab Results   Component Value Date    PSA <0.01 04/14/2025    PSA 5.9 (H) 12/13/2021    PSA 2.4 10/05/2020    PSA 1.7 03/19/2019    PSA 1.4 03/22/2018    PSA 0.85 03/26/2015    PSADIAG <0.01 01/30/2025    PSADIAG <0.01 10/17/2024    PSADIAG <0.01 06/10/2024    PSADIAG 14.9 (H) 03/21/2023    PSADIAG 9.0 (H) 09/22/2022    PSAFREE 0.74 12/30/2021    PSAFREEPCT 10.72 12/30/2021     ASSESSMENT     1. Erectile dysfunction after radical prostatectomy        PLAN:   1. Erectile dysfunction after radical prostatectomy    Other orders  -     polyethylene glycol (GLYCOLAX) 17 gram PwPk; Take 17 g by mouth once daily.  Dispense: 30 packet; Refill: 0  -     sulfamethoxazole-trimethoprim 800-160mg (BACTRIM DS) 800-160 mg Tab; Take 1 tablet by mouth 2 (two) times daily. for 5 days  Dispense: 10 tablet; Refill: 0        VT performed. 150 cc instilled, Douglas removed, 120 cc voided. Douglas not replaced.  Leave drain in place until output is 30 cc or less per day. RTC 1 week, sooner if output drops below 30 cc/day.  Rx sent for Miralax.  Reiterated instructions - no cycling device, heavy lifting, or soaking in water; he can shower once drain is removed, sponge bathing only until then. Continue Bactrim until drain is removed.     Billy Potter MD

## 2025-06-30 NOTE — LETTER
June 30, 2025      [facility] - [facility]rology [address]  [address]  [place] [place] [address]  Phone: [phone]  Fax: [phone]       Patient: Jamar Sheets   YOB: 2001  Date of Visit: 06/30/2025    To Whom It May Concern:    Jamar Sheets was at [facility] on 06/30/2025. The patient may return to work/school on 07/02/2025 with no restrictions. If you have any questions or concerns, or if I can be of further assistance, please do not hesitate to contact me.    Sincerely,    Luis E Mancilla MA

## 2025-07-07 ENCOUNTER — NURSE TRIAGE (OUTPATIENT)
Dept: ADMINISTRATIVE | Facility: CLINIC | Age: 70
End: 2025-07-07
Payer: MEDICARE

## 2025-07-07 NOTE — TELEPHONE ENCOUNTER
Pt reports he accidentally missed his appt today to have his drain tubes removed, accidentally mixed up his appt time, so now wanting to know how long he can wait to have it removed as his next appt isn't until 7/14/25. Pt states it is still draining a very minimal amount of light pink fluid, denies any s/sx of infection or discomfort. Pt advised to call MD within 24 hours per protocol, Pt encouraged to call back with any worsening symptoms or questions. He verbalized understanding.    Reason for Disposition   [1] Caller has NON-URGENT question AND [2] triager unable to answer question    Additional Information   Negative: Shock suspected (e.g., cold/pale/clammy skin, too weak to stand, low BP, rapid pulse)   Negative: Sounds like a life-threatening emergency to the triager   Negative: [1] Bleeding around the drain site AND [2] won't stop after 10 minutes of direct pressure   Negative: [1] Bright red bleeding or bloody discharge from drain (J Luis-Yun, Hemovac, Penrose) AND [2] new or worsening  (Exception: Flecks of blood, pink-tinged drainage, or unchanged from normal.)   Negative: [1] Widespread rash AND [2] bright red, sunburn-like   Negative: SEVERE pain (e.g., excruciating) at drain site   Negative: Patient sounds very sick or weak to the triager   Negative: Fever > 100.4 F (38.0 C)   Negative: Pus or bad-smelling fluid in the J Luis-Yun or Hemovac   Negative: [1] Pus or bad-smelling fluid draining from Penrose AND [2] new-onset or getting WORSE   Negative: [1] Red streak runs from the drain site AND [2] longer than 1 inch (2.5 cm)   Negative: [1] Skin redness at drain site AND [2] size > 2 inches (5 cm)   Negative: [1] Swelling around the drain site AND [2] new-onset or getting worse   Negative: Pus or cloudy bad-smelling fluid leaking from the drain site   Negative: [1] Increased fluid leaking around the drain site AND [2] has tried milking (stripping) the drain (J Luis-Yun or Hemovac) tube    Negative: Drain tubing or part of the drain is broken or cracked   Negative: [1] Dressing needs to be changed (e.g., wet, soiled, or open to air) AND [2] unable to perform dressing change   Negative: [1] Has J Luis-Yun or Hemovac drain AND [2] suddenly not draining AND [3] has not tried milking or stripping the drain tube   Negative: [1] MILD to MODERATE pain at the drain site AND [2] new-onset or getting worse   Negative: Drain fell out or was accidentally pulled out   Negative: Stitches (sutures) holding the drain in place are broken or coming out   Negative: [1] Raised bruise at drain site AND [2] size > 2 inches (5 cm) and growing   Negative: [1] Has J Luis-Yun or Hemovac drain AND [2] suction bulb or reservoir is not staying collapsed   Negative: [1] Caller has URGENT question AND [2] triager unable to answer question    Protocols used: Post-Op Wound Drains and Tubes - Symptoms and Vnqhivwni-C-CL

## 2025-07-08 ENCOUNTER — TELEPHONE (OUTPATIENT)
Dept: UROLOGY | Facility: CLINIC | Age: 70
End: 2025-07-08
Payer: MEDICARE

## 2025-07-08 NOTE — TELEPHONE ENCOUNTER
Copied from CRM #[id number]. Topic: Appointments - Appointment Access  >> Jul 7, 2025  5:33 PM Marcia wrote:  Type :  Needs Medical Advice    Who Called: pt wife  Symptoms (please be specific): drain tub   How long has patient had these symptoms:  drain tub  Pharmacy name and phone #:  no  Would the patient rather a call back or a response via [facility]? call  Best Call Back Number: [phone]  Additional Information:

## 2025-07-10 ENCOUNTER — TELEPHONE (OUTPATIENT)
Dept: UROLOGY | Facility: HOSPITAL | Age: 70
End: 2025-07-10
Payer: MEDICARE

## 2025-07-10 ENCOUNTER — HOSPITAL ENCOUNTER (EMERGENCY)
Facility: OTHER | Age: 70
Discharge: HOME | End: 2025-07-10
Attending: EMERGENCY MEDICINE
Payer: MEDICARE

## 2025-07-10 ENCOUNTER — TELEPHONE (OUTPATIENT)
Dept: UROLOGY | Facility: CLINIC | Age: 70
End: 2025-07-10
Payer: MEDICARE

## 2025-07-10 VITALS
RESPIRATION RATE: 18 BRPM | DIASTOLIC BLOOD PRESSURE: 79 MMHG | OXYGEN SATURATION: 97 % | HEIGHT: 71 IN | SYSTOLIC BLOOD PRESSURE: 149 MMHG | WEIGHT: 170 LBS | TEMPERATURE: 98 F | HEART RATE: 82 BPM | BODY MASS INDEX: 23.8 KG/M2

## 2025-07-10 DIAGNOSIS — G89.18 POST-OP PAIN: Primary | ICD-10-CM

## 2025-07-10 DIAGNOSIS — Z48.03 ENCOUNTER FOR CHANGE OR REMOVAL OF DRAINS: ICD-10-CM

## 2025-07-10 PROCEDURE — 25000003 PHARM REV CODE 250: Performed by: EMERGENCY MEDICINE

## 2025-07-10 PROCEDURE — 99999 HC NO LEVEL OF SERVICE - ED ONLY: CPT

## 2025-07-10 RX ORDER — NAPROXEN 500 MG/1
500 TABLET ORAL 2 TIMES DAILY PRN
Qty: 14 TABLET | Refills: 0 | Status: SHIPPED | OUTPATIENT
Start: 2025-07-10 | End: 2025-07-17

## 2025-07-10 RX ORDER — OXYCODONE HYDROCHLORIDE 5 MG/1
5 TABLET ORAL
Refills: 0 | Status: COMPLETED | OUTPATIENT
Start: 2025-07-10 | End: 2025-07-10

## 2025-07-10 RX ORDER — HYDROCODONE BITARTRATE AND ACETAMINOPHEN 5; 325 MG/1; MG/1
1 TABLET ORAL
Refills: 0 | Status: DISCONTINUED | OUTPATIENT
Start: 2025-07-10 | End: 2025-07-10

## 2025-07-10 RX ADMIN — OXYCODONE 5 MG: 5 TABLET ORAL at 04:07

## 2025-07-10 NOTE — FIRST PROVIDER EVALUATION
" Emergency Department TeleTriage Encounter Note      CHIEF COMPLAINT    Chief Complaint   Patient presents with    Post-op Problem     Had penile prosthetic placed on the 27th last month and his follow up appointments were cancelled. Report "I still have a stitch in there and it is pulling my skin". Also has LEXX drain in place. Denies difficulty urinating.        VITAL SIGNS   Initial Vitals [07/10/25 1545]   BP Pulse Resp Temp SpO2   (!) 149/79 104 18 97.9 °F (36.6 °C) 97 %      MAP       --            ALLERGIES    Review of patient's allergies indicates:  No Known Allergies    PROVIDER TRIAGE NOTE  This is a teletriage evaluation of a 69 y.o. male presenting to the ED complaining of post-op problem. Patient had penile prosthetic on 6/27/25. Patient has follow up scheduled for next week but reports suture on LEXX drain is pulling at his skin and causing some pain.    Patient is alert and oriented. He speaks in complete sentences. He is sitting upright in the chair in no distress.     Initial orders will be placed and care will be transferred to an alternate provider when patient is roomed for a full evaluation. Any additional orders and the final disposition will be determined by that provider.         ORDERS  Labs Reviewed - No data to display    ED Orders (720h ago, onward)      None              Virtual Visit Note: The provider triage portion of this emergency department evaluation and documentation was performed via [employer], a HIPAA-compliant telemedicine application, in concert with a tele-presenter in the room. A face to face patient evaluation with one of my colleagues will occur once the patient is placed in an emergency department room.      DISCLAIMER: This note was prepared with [employer] voice recognition transcription software. Garbled syntax, mangled pronouns, and other bizarre constructions may be attributed to that software system.    "

## 2025-07-10 NOTE — TELEPHONE ENCOUNTER
Initial hospital care note    Patient was seen in the emergency room complaining of pain at the drain site he had an appointment today which needed to be canceled because the physician was ill    Afebrile vital signs stable  IPP in place with no signs of infection no erythema no fluctuance no skin fixation  LEXX output has been minimal LEXX site is clean and dry.  LEXX drain removed without difficulty and sterile dressing placed    Status post inflatable penile prosthesis doing well  Discomfort was relieved at the at the drain was removed    Reiterated instructions - no cycling device, heavy lifting, or soaking in water; he can shower once drain is removed, sponge bathing only until then.     Okay to stop Bactrim another drain is out  Tylenol and Motrin over-the-counter p.r.n. pain  I will have Dr. Potter's office call him to arrange follow up in 1 week

## 2025-07-10 NOTE — DISCHARGE INSTRUCTIONS

## 2025-07-10 NOTE — TELEPHONE ENCOUNTER
----- Message from Elidia sent at 7/10/2025  9:09 AM CDT -----  CRM # [id number]  Owner: None  Status: Unresolved  Open  Priority: High Created on: 07/09/2025 01:29 PM By: Rocío Jacobson    Primary Information    Source  Herbie Naqvi (Patient)     Subject  Herbie Naqvi (Patient)     Topic  Appointments - Appointment Access    Communication  Type: Patient Call Back        Who called:pt wife        What is the request in detail:pt was a pt of dr Potter and his appt was recently cancelled, she is stating he needs his tube removed and was wondering can dr Miller remove it this week for him. Please assist.        Can the clinic reply by [facility]? no        Would the patient rather a call back or a response via [facility]? call        Best call back number:[phone]

## 2025-07-10 NOTE — ED PROVIDER NOTES
"  Source of History:  Medical record, patient    Chief complaint:  Per triage note: "Post-op Problem (Had penile prosthetic placed on the 27th last month and his follow up appointments were cancelled. Report "I still have a stitch in there and it is pulling my skin". Also has LEXX drain in place. Denies difficulty urinating. )  "    HPI:    Patient presents for evaluation of pain at his right groin drain site which he believes is due to the drain tubing pulling on the suture.  He reports minimal output into the drain for at least last 5 days.  He denies any fevers, or other complaints.  He has not taken anything for the pain in the past several days since running out of his prescribed Roxicodone.       ROS:   See HPI for pertinent review of systems        Review of patient's allergies indicates:  No Known Allergies    PMH:  As per HPI and below:  Past Medical History:   Diagnosis Date    Prostate cancer     Wears prescription eyeglasses        Past Surgical History:   Procedure Laterality Date    COLONOSCOPY N/A 11/30/2017    Procedure: COLONOSCOPY;  Surgeon: John Roman MD;  Location: [facility];  Service: Endoscopy;  Laterality: N/A;  colonoscopy confirmed    COLONOSCOPY, SCREENING, LOW RISK PATIENT N/A 05/19/2025    Procedure: COLONOSCOPY, SCREENING, LOW RISK PATIENT;  Surgeon: Brady Bhatt MD;  Location: [facility] ([address] [place] [address]);  Service: Endoscopy;  Laterality: N/A;  4/3 ref by  Edwin Lawson MD, PEG, portal-gg  5/8 Precall completed with spouse. SS    INSERTION OF INFLATABLE PENILE PROSTHESIS N/A 6/27/2025    Procedure: INSERTION, INFLATABLE PENILE PROSTHESIS;  Surgeon: Billy Potter MD;  Location: [facility];  Service: Urology;  Laterality: N/A;    ROBOT-ASSISTED LAPAROSCOPIC PROSTATECTOMY N/A 12/20/2023    Procedure: ROBOTIC PROSTATECTOMY;  Surgeon: Armani Miller MD;  Location: [facility];  Service: Urology;  Laterality: N/A;    wrist broke  Right     rt side wrist       Social " "History[1]    Physical Exam:      Nursing note and vitals reviewed.  BP (!) 149/79   Pulse 82   Temp 97.9 °F (36.6 °C) (Oral)   Resp 18   Ht 5' 11" (1.803 m)   Wt 77.1 kg (170 lb)   SpO2 97%   BMI 23.71 kg/m²     Constitutional: AAOx3. Well appearing.   Eyes: EOMI. No discharge. Anicteric.  HENT:   Mouth/Throat:    Neck: Normal range of motion. Neck supple.    Cardiovascular: Normal rate  Pulmonary/Chest: No respiratory distress.   Abdominal: No distension   Musculoskeletal: Normal range of motion.   Neurological: GCS15. Alert and oriented to person, place, and time. No gross cranial nerve II-XII, focal strength, or light touch deficit. Steady gait.   Skin: Skin is warm and dry.   Genitourinary: Penis normal. R inguinal incision site clean/dry/intact.  There is a great deal of tension on the suture from tubing position.  Cremasteric reflex is present. Right testis shows no mass, no swelling and no tenderness. Left testis shows no mass, no swelling and no tenderness. No phimosis, paraphimosis or penile erythema. No discharge found.  Exam chaperoned by tech or RN.         Medical Decision Making / Independent Interpretations / External Records Reviewed:      Patient is a 69-year-old male with history of prostate cancer, status post penile implant placement June 26 who presents for evaluation of pain in his right groin drain site he attributes to these drain tubing pulling on the suture.  He states that there has been minimal drainage into the drainage bulb over the past 5 days.  Denies any other complaints.  He ran out of his prescribed oxycodone several days ago.    On exam, patient right inguinal incision site clean/dry/intact with a great deal of tension on the suture.   I doubt cellulitis, other infection, or fluid collection.  Patient has met criteria for drain tube removal per recent urology note.  We will consult Urology.    ED Course as of 07/11/25 0138   Thu Jul 10, 2025   1707 Patient history, " findings, results, patient management discussed with urologist Dr. Miller. He subsequently removed the pt's drain.  He agrees that the pain was probably primarily due to tension on the suture.   --  I discussed with pt and/or guardian/caretaker that this evaluation in the ED does not suggest any emergent or life threatening medical condition requiring admission or further immediate intervention or diagnostics. Regardless, an unremarkable evaluation in the ED does not preclude the development or presence of a serious or life threatening condition. Pt was instructed to return for any worsening, new, changed, or concerning symptoms.     I had a detailed discussion with patient and/or guardian/caretaker regarding findings, plan, return precautions, importance of medication adherence, need to follow-up with a PCP and specialist. All questions answered.     Note was created using voice recognition software. It may have occasional typographical errors not identified and edited despite initial review prior to signing.      [RC]      ED Course User Index  [RC] Rosalio Stark MD              Procedures      Medications   oxyCODONE immediate release tablet 5 mg (5 mg Oral Given 7/10/25 3318)       --  I decided to obtain the patient's medical records. I reviewed patient's prior external notes / results:  specialist documentation  , inpatient admission documentation, and pharmacy / prescription records.   --  Additional Medical Decision Making: Prescription drug management and Decision regarding emergent surgery / procedure      Launch MDCalc MDM  [facility]  Jul 11 2025 1:37 AM [Rosalio Stark]  Data:  - Discussed with external professional: Case discussed with Urology. See MDM section and/or ED Course for additional details on the discussion.  Additional encounter diagnoses: Post-op pain, Encounter for change or removal of drains  Risk: oxyCODONE immediate release tablet (Rx drug management)             Future  Appointments   Date Time Provider Department Center   7/14/2025  3:15 PM Billy Potter MD [facility] UROLGY [facility]   10/24/2025 10:15 AM Armani Miller MD [facility] UROLOGY [facility]   1/30/2026  9:00 AM Edwin Lawson MD [facility]          Diagnostic Impression:    1. Post-op pain    2. Encounter for change or removal of drains             ED Disposition Condition    Discharge Good          ED Prescriptions       Medication Sig Dispense Start Date End Date Auth. Provider    naproxen (NAPROSYN) 500 MG tablet Take 1 tablet (500 mg total) by mouth 2 (two) times daily as needed (pain). 14 tablet 7/10/2025 7/17/2025 Rosalio Stark MD          Follow-up Information       Follow up With Specialties Details Why Contact Info    Edwin Lawson MD Internal Medicine, Family Medicine, Hospitalist   [address] [place] [address]  [phone]      Your urologist  Schedule an appointment as soon as possible for a visit on 7/14/2025 For recheck with specialist                  [1]   Social History  Tobacco Use    Smoking status: Former     Types: Cigarettes     Start date: 3/25/2014     Passive exposure: Never    Smokeless tobacco: Never   Vaping Use    Vaping status: Former   Substance Use Topics    Alcohol use: No     Comment: former drinker social stop drinking    Drug use: No        Rosalio Stark MD  07/11/25 0138

## 2025-08-06 ENCOUNTER — OFFICE VISIT (OUTPATIENT)
Dept: UROLOGY | Facility: CLINIC | Age: 70
End: 2025-08-06
Payer: MEDICARE

## 2025-08-06 VITALS
BODY MASS INDEX: 23.8 KG/M2 | HEIGHT: 71 IN | WEIGHT: 170 LBS | SYSTOLIC BLOOD PRESSURE: 131 MMHG | HEART RATE: 62 BPM | DIASTOLIC BLOOD PRESSURE: 76 MMHG

## 2025-08-06 DIAGNOSIS — N52.31 ERECTILE DYSFUNCTION AFTER RADICAL PROSTATECTOMY: Primary | ICD-10-CM

## 2025-08-06 LAB
BILIRUB SERPL-MCNC: NORMAL MG/DL
BLOOD URINE, POC: NORMAL
CLARITY, POC UA: NORMAL
COLOR, POC UA: NORMAL
GLUCOSE UR QL STRIP: NORMAL
KETONES UR QL STRIP: NORMAL
LEUKOCYTE ESTERASE URINE, POC: NORMAL
NITRITE, POC UA: NORMAL
PH, POC UA: NORMAL
POC RESIDUAL URINE VOLUME: NORMAL (ref 0–100)
PROTEIN, POC: NORMAL
SPECIFIC GRAVITY, POC UA: NORMAL
UROBILINOGEN, POC UA: NORMAL

## 2025-08-06 PROCEDURE — 1126F AMNT PAIN NOTED NONE PRSNT: CPT | Mod: CPTII,HCNC,S$GLB, | Performed by: STUDENT IN AN ORGANIZED HEALTH CARE EDUCATION/TRAINING PROGRAM

## 2025-08-06 PROCEDURE — 99999 PR PBB SHADOW E&M-EST. PATIENT-LVL III: CPT | Mod: PBBFAC,HCNC,, | Performed by: STUDENT IN AN ORGANIZED HEALTH CARE EDUCATION/TRAINING PROGRAM

## 2025-08-06 PROCEDURE — 3078F DIAST BP <80 MM HG: CPT | Mod: CPTII,HCNC,S$GLB, | Performed by: STUDENT IN AN ORGANIZED HEALTH CARE EDUCATION/TRAINING PROGRAM

## 2025-08-06 PROCEDURE — 81002 URINALYSIS NONAUTO W/O SCOPE: CPT | Mod: HCNC,S$GLB,, | Performed by: STUDENT IN AN ORGANIZED HEALTH CARE EDUCATION/TRAINING PROGRAM

## 2025-08-06 PROCEDURE — 3044F HG A1C LEVEL LT 7.0%: CPT | Mod: CPTII,HCNC,S$GLB, | Performed by: STUDENT IN AN ORGANIZED HEALTH CARE EDUCATION/TRAINING PROGRAM

## 2025-08-06 PROCEDURE — 3075F SYST BP GE 130 - 139MM HG: CPT | Mod: CPTII,HCNC,S$GLB, | Performed by: STUDENT IN AN ORGANIZED HEALTH CARE EDUCATION/TRAINING PROGRAM

## 2025-08-06 PROCEDURE — 99024 POSTOP FOLLOW-UP VISIT: CPT | Mod: HCNC,S$GLB,, | Performed by: STUDENT IN AN ORGANIZED HEALTH CARE EDUCATION/TRAINING PROGRAM

## 2025-08-06 PROCEDURE — 1159F MED LIST DOCD IN RCRD: CPT | Mod: CPTII,HCNC,S$GLB, | Performed by: STUDENT IN AN ORGANIZED HEALTH CARE EDUCATION/TRAINING PROGRAM

## 2025-08-06 PROCEDURE — 51798 US URINE CAPACITY MEASURE: CPT | Mod: HCNC,S$GLB,, | Performed by: STUDENT IN AN ORGANIZED HEALTH CARE EDUCATION/TRAINING PROGRAM

## 2025-08-06 NOTE — PROGRESS NOTES
"[facility] - Urology   Clinic Note    SUBJECTIVE:     Chief Complaint: ED    History of Present Illness:  Herbie Naqvi is a 70 y.o. male who presents to clinic for ED. He is established to our clinic but new to me.     Tried and failed PDE5i's and Trimix.  He underwent RALP with BPLND with Dr. Miller in 2023. 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    Denies any prior history of MI, CVA, or CAD.     He underwent placement of 3-piece IPP (Coloplast) with me on 6/27/25.       OBJECTIVE:     Estimated body mass index is 23.71 kg/m² as calculated from the following:    Height as of this encounter: 5' 11" (1.803 m).    Weight as of this encounter: 77.1 kg (169 lb 15.6 oz).    Vital Signs (Most Recent)  Pulse: 62 (08/06/25 0917)  BP: 131/76 (08/06/25 0917)    Physical Exam  Constitutional:       General: He is not in acute distress.     Appearance: Normal appearance.   HENT:      Head: Normocephalic and atraumatic.   Eyes:      Extraocular Movements: Extraocular movements intact.      Pupils: Pupils are equal, round, and reactive to light.   Cardiovascular:      Rate and Rhythm: Normal rate.   Pulmonary:      Effort: Pulmonary effort is normal. No respiratory distress.   Abdominal:      General: Abdomen is flat. There is no distension.      Tenderness: There is no abdominal tenderness. There is no right CVA tenderness or left CVA tenderness.   Genitourinary:     Comments: Circumcised penis, 3-piece IPP in place; penoscrotal incision well-healed    Skin:     Coloration: Skin is not jaundiced.   Neurological:      General: No focal deficit present.      Mental Status: He is alert and oriented to person, place, and time.   Psychiatric:         Mood and Affect: Mood normal.         Behavior: Behavior normal.       Lab Results   Component Value Date    BUN 15 06/28/2025    CREATININE 0.8 06/28/2025    WBC 7.66 06/28/2025    HGB 10.9 (L) 06/28/2025    HCT 33.2 (L) 06/28/2025 "     06/28/2025    AST 26 06/28/2025    ALT 19 06/28/2025    ALKPHOS 55 06/28/2025    ALBUMIN 3.6 06/28/2025    HGBA1C 5.9 (H) 01/30/2025        Lab Results   Component Value Date    PSA <0.01 04/14/2025    PSA 5.9 (H) 12/13/2021    PSA 2.4 10/05/2020    PSA 1.7 03/19/2019    PSA 1.4 03/22/2018    PSA 0.85 03/26/2015    PSADIAG <0.01 01/30/2025    PSADIAG <0.01 10/17/2024    PSADIAG <0.01 06/10/2024    PSADIAG 14.9 (H) 03/21/2023    PSADIAG 9.0 (H) 09/22/2022    PSAFREE 0.74 12/30/2021    PSAFREEPCT 10.72 12/30/2021     ASSESSMENT     1. Erectile dysfunction after radical prostatectomy          PLAN:   1. Erectile dysfunction after radical prostatectomy        Demonstrated to patient how to inflate and deflate device. Instructed him to avoid leaving the device inflated continuously, to prevent erosion. Instructed him to inflate the device at least once daily until inflation and deflation feel easy to him.    F/u with Dr. Miller as scheduled.    Billy Potter MD

## 2025-08-28 DIAGNOSIS — H40.1132 PRIMARY OPEN ANGLE GLAUCOMA (POAG) OF BOTH EYES, MODERATE STAGE: Primary | ICD-10-CM

## 2025-08-28 RX ORDER — LATANOPROST 50 UG/ML
1 SOLUTION/ DROPS OPHTHALMIC DAILY
Qty: 7.5 ML | Refills: 3 | Status: SHIPPED | OUTPATIENT
Start: 2025-08-28 | End: 2026-08-28

## (undated) DEVICE — GAUZE SPONGE PEANUT STRL

## (undated) DEVICE — CUP MEDICINE GRADUATED 1OZ

## (undated) DEVICE — CLIP HEMOLOK POLYMER XL 6 CLIP

## (undated) DEVICE — TRAY DO THE ROBOT

## (undated) DEVICE — SCALPEL #15 BLADE STRL DISP.

## (undated) DEVICE — OBTURATOR BLADELESS 8MM XI CLR

## (undated) DEVICE — CONTAINER SPEC OR STRL 4.5OZ

## (undated) DEVICE — APPLICATOR CHLORAPREP ORN 26ML

## (undated) DEVICE — SYR 10CC LUER LOCK

## (undated) DEVICE — DEVICE SNAPSECURE FOL CATH

## (undated) DEVICE — DRAPE COLUMN DAVINCI XI

## (undated) DEVICE — ELECTRODE REM PLYHSV RETURN 9

## (undated) DEVICE — TROCAR ENDOPATH XCEL 12X100MM

## (undated) DEVICE — SOL ELECTROLUBE ANTI-STIC

## (undated) DEVICE — DRAPE UND BUTT W/POUCH 40X44IN

## (undated) DEVICE — UNDERGLOVES BIOGEL PI SIZE 8

## (undated) DEVICE — BAG TISSUE RETRIEVAL 225ML

## (undated) DEVICE — BRIEF MESH LARGE

## (undated) DEVICE — DEVICE CLSR PDS 0 CT-1 12IN

## (undated) DEVICE — BLADE SURG #15 CARBON STEEL

## (undated) DEVICE — SUT CTD VICRYL 0 UND BR SUT

## (undated) DEVICE — TOWEL OR DISP STRL BLUE 4/PK

## (undated) DEVICE — APPLICATOR ENDOSCOPIC

## (undated) DEVICE — DRESSING XEROFORM NONADH 1X8IN

## (undated) DEVICE — SOL POVIDONE PREP IODINE 4 OZ

## (undated) DEVICE — DRAPE STERI INSTRUMENT 1018

## (undated) DEVICE — COVER MAYO STND XL 30X57IN

## (undated) DEVICE — GLOVE SENSICARE PI GRN 8

## (undated) DEVICE — GLOVE SENSICARE PI ORTHO 7.0

## (undated) DEVICE — Device

## (undated) DEVICE — SEALER VESSEL EXTEND

## (undated) DEVICE — PORT AIRSEAL 12/120MM LPI

## (undated) DEVICE — SYS SEE SHARP SCP ANTIFG LNG

## (undated) DEVICE — SYR 30CC LUER LOCK

## (undated) DEVICE — SOL POVIDONE SCRUB IODINE 4 OZ

## (undated) DEVICE — BOWL STERILE LARGE 32OZ

## (undated) DEVICE — PORT ACCESS 5MM W/120MM

## (undated) DEVICE — ELECTRODE PATIENT RETURN DISP

## (undated) DEVICE — SET TRI-LUMEN FILTERED TUBE

## (undated) DEVICE — SUT ETHIBOND EXCEL 0 CT2 30

## (undated) DEVICE — CLIP HEMO-LOK MLX LARGE LF

## (undated) DEVICE — HOOK STAY ELAS 5MM 8EA/PK

## (undated) DEVICE — KIT WING PAD POSITIONING

## (undated) DEVICE — SUT VICRYL CTD 2-0 GI 27 SH

## (undated) DEVICE — IRRIGATOR ENDOSCOPY DISP.

## (undated) DEVICE — DRAPE LAP T SHT W/ INSTR PAD

## (undated) DEVICE — SUT ETHILON 3-0 FS-1 30

## (undated) DEVICE — SYR 50ML CATH TIP

## (undated) DEVICE — WRAP COBAN NL STRL 4INX5YD

## (undated) DEVICE — GLOVE BIOGEL SKINSENSE PI 6.5

## (undated) DEVICE — JELLY SURGILUBE 5GR

## (undated) DEVICE — SUT ABS CLIP LAPRA-TY CTD

## (undated) DEVICE — STAPLER SKIN ROTATING HEAD

## (undated) DEVICE — DRAIN CHANNEL ROUND 15FR

## (undated) DEVICE — SYR 50CC LL

## (undated) DEVICE — HOOK LONE STAR BLUNT 12MM

## (undated) DEVICE — DRAPE TOP 53X102IN

## (undated) DEVICE — TRAY SKIN SCRUB WET 4 COMPART

## (undated) DEVICE — BAG DRAIN ANTI REFLUX 2000ML

## (undated) DEVICE — JELLY SURGILUBE LUBE TUBE 2OZ

## (undated) DEVICE — GAUZE FLUFF XXLG 36X36 2 PLY

## (undated) DEVICE — SPONGE COTTON TRAY 4X4IN

## (undated) DEVICE — GOWN SURGICAL X-LARGE

## (undated) DEVICE — SUT VICRYL+ 27 UR-6 VIOL

## (undated) DEVICE — CATH ELASTMR STR TIP 16FR 10ML

## (undated) DEVICE — EVACUATOR WOUND BULB 100CC

## (undated) DEVICE — DRAPE INCISE IOBAN 2 23X17IN

## (undated) DEVICE — SUT CHROMIC 3-0 SH 27IN GUT

## (undated) DEVICE — HEMOSTAT SURGICEL 4X8IN

## (undated) DEVICE — SUT VICRYL 3-0 27 SH

## (undated) DEVICE — DRAPE ARM DAVINCI XI

## (undated) DEVICE — COVER TIP CURVED SCISSORS XI

## (undated) DEVICE — GLOVE SENSICARE PI GRN 7.5

## (undated) DEVICE — SUT POLYSORB 0 27 GU-46 VIOLET

## (undated) DEVICE — SUT MCRYL PLUS 4-0 PS2 27IN

## (undated) DEVICE — RETRACTOR LONE STAR 28.3X18.3

## (undated) DEVICE — SEAL UNIVERSAL 5MM-8MM XI

## (undated) DEVICE — TAPE MEDIPORE 3 X 10YD

## (undated) DEVICE — SPONGE SURGIFOAM 100 8.5X12X10

## (undated) DEVICE — SPONGE KERLIX NS 12-PLY 4X4IN

## (undated) DEVICE — CLIPPER BLADE MOD 4406 (CAREF)

## (undated) DEVICE — SOL IRRI STRL WATER 1000ML

## (undated) DEVICE — TIP YANKAUERS BULB NO VENT

## (undated) DEVICE — NDL INSUFFLATION VERRES 120MM